# Patient Record
Sex: MALE | Race: WHITE | Employment: OTHER | URBAN - METROPOLITAN AREA
[De-identification: names, ages, dates, MRNs, and addresses within clinical notes are randomized per-mention and may not be internally consistent; named-entity substitution may affect disease eponyms.]

---

## 2017-08-17 ENCOUNTER — OFFICE VISIT (OUTPATIENT)
Dept: PRIMARY CARE CLINIC | Age: 62
End: 2017-08-17
Payer: COMMERCIAL

## 2017-08-17 VITALS
HEIGHT: 70 IN | WEIGHT: 152 LBS | DIASTOLIC BLOOD PRESSURE: 86 MMHG | HEART RATE: 76 BPM | BODY MASS INDEX: 21.76 KG/M2 | RESPIRATION RATE: 18 BRPM | SYSTOLIC BLOOD PRESSURE: 158 MMHG

## 2017-08-17 DIAGNOSIS — L24.7 IRRITANT CONTACT DERMATITIS DUE TO PLANTS, EXCEPT FOOD: Primary | ICD-10-CM

## 2017-08-17 PROCEDURE — 99212 OFFICE O/P EST SF 10 MIN: CPT | Performed by: INTERNAL MEDICINE

## 2017-08-17 RX ORDER — HYDROXYZINE HYDROCHLORIDE 10 MG/1
10 TABLET, FILM COATED ORAL 2 TIMES DAILY PRN
Qty: 60 TABLET | Refills: 1 | Status: SHIPPED | OUTPATIENT
Start: 2017-08-17 | End: 2018-06-29 | Stop reason: ALTCHOICE

## 2017-08-17 RX ORDER — CLOBETASOL PROPIONATE 0.5 MG/G
CREAM TOPICAL DAILY
Qty: 30 G | Refills: 1 | Status: SHIPPED | OUTPATIENT
Start: 2017-08-17 | End: 2019-01-17

## 2017-08-17 ASSESSMENT — ENCOUNTER SYMPTOMS
VOMITING: 0
EYE ITCHING: 0
COLOR CHANGE: 1
EYE DISCHARGE: 0
SORE THROAT: 0
SINUS PRESSURE: 0
DIARRHEA: 0
EYE REDNESS: 0
NAUSEA: 0
SHORTNESS OF BREATH: 0
ABDOMINAL PAIN: 0
EYE PAIN: 0

## 2017-08-17 ASSESSMENT — PATIENT HEALTH QUESTIONNAIRE - PHQ9
SUM OF ALL RESPONSES TO PHQ QUESTIONS 1-9: 0
SUM OF ALL RESPONSES TO PHQ9 QUESTIONS 1 & 2: 0
2. FEELING DOWN, DEPRESSED OR HOPELESS: 0
1. LITTLE INTEREST OR PLEASURE IN DOING THINGS: 0

## 2017-11-17 ENCOUNTER — OFFICE VISIT (OUTPATIENT)
Dept: PRIMARY CARE CLINIC | Age: 62
End: 2017-11-17
Payer: COMMERCIAL

## 2017-11-17 VITALS
DIASTOLIC BLOOD PRESSURE: 88 MMHG | SYSTOLIC BLOOD PRESSURE: 138 MMHG | RESPIRATION RATE: 18 BRPM | WEIGHT: 154.4 LBS | BODY MASS INDEX: 22.1 KG/M2 | HEART RATE: 56 BPM | HEIGHT: 70 IN

## 2017-11-17 DIAGNOSIS — Z13.0 SCREENING, ANEMIA, DEFICIENCY, IRON: ICD-10-CM

## 2017-11-17 DIAGNOSIS — I10 ESSENTIAL HYPERTENSION: ICD-10-CM

## 2017-11-17 DIAGNOSIS — Z23 NEED FOR INFLUENZA VACCINATION: ICD-10-CM

## 2017-11-17 DIAGNOSIS — K21.9 GASTROESOPHAGEAL REFLUX DISEASE, ESOPHAGITIS PRESENCE NOT SPECIFIED: ICD-10-CM

## 2017-11-17 DIAGNOSIS — I25.10 CORONARY ARTERY DISEASE INVOLVING NATIVE HEART WITHOUT ANGINA PECTORIS, UNSPECIFIED VESSEL OR LESION TYPE: Chronic | ICD-10-CM

## 2017-11-17 DIAGNOSIS — J45.40 MODERATE PERSISTENT ASTHMA WITHOUT COMPLICATION: ICD-10-CM

## 2017-11-17 DIAGNOSIS — Z23 NEED FOR VACCINATION FOR STREP PNEUMONIAE: ICD-10-CM

## 2017-11-17 DIAGNOSIS — Z00.00 ROUTINE GENERAL MEDICAL EXAMINATION AT A HEALTH CARE FACILITY: Primary | ICD-10-CM

## 2017-11-17 PROCEDURE — 90732 PPSV23 VACC 2 YRS+ SUBQ/IM: CPT | Performed by: NURSE PRACTITIONER

## 2017-11-17 PROCEDURE — 90471 IMMUNIZATION ADMIN: CPT | Performed by: NURSE PRACTITIONER

## 2017-11-17 PROCEDURE — 99396 PREV VISIT EST AGE 40-64: CPT | Performed by: NURSE PRACTITIONER

## 2017-11-17 PROCEDURE — 90658 IIV3 VACCINE SPLT 0.5 ML IM: CPT | Performed by: NURSE PRACTITIONER

## 2017-11-17 PROCEDURE — 90472 IMMUNIZATION ADMIN EACH ADD: CPT | Performed by: NURSE PRACTITIONER

## 2017-11-17 RX ORDER — FLUTICASONE PROPIONATE 50 MCG
SPRAY, SUSPENSION (ML) NASAL
Qty: 48 G | Refills: 3 | Status: SHIPPED | OUTPATIENT
Start: 2017-11-17 | End: 2018-04-03 | Stop reason: SDUPTHER

## 2017-11-17 RX ORDER — MONTELUKAST SODIUM 10 MG/1
10 TABLET ORAL NIGHTLY
Qty: 90 TABLET | Refills: 3 | Status: SHIPPED | OUTPATIENT
Start: 2017-11-17 | End: 2019-01-17

## 2017-11-17 RX ORDER — ALBUTEROL SULFATE 90 UG/1
AEROSOL, METERED RESPIRATORY (INHALATION)
Qty: 54 G | Refills: 3 | Status: SHIPPED | OUTPATIENT
Start: 2017-11-17 | End: 2019-01-17 | Stop reason: SDUPTHER

## 2017-11-17 RX ORDER — PREDNISONE 20 MG/1
20 TABLET ORAL DAILY PRN
COMMUNITY
End: 2017-11-17 | Stop reason: SDUPTHER

## 2017-11-17 RX ORDER — PANTOPRAZOLE SODIUM 40 MG/1
40 TABLET, DELAYED RELEASE ORAL DAILY
Qty: 90 TABLET | Refills: 3 | Status: SHIPPED | OUTPATIENT
Start: 2017-11-17 | End: 2019-01-17 | Stop reason: SDUPTHER

## 2017-11-17 RX ORDER — PREDNISONE 20 MG/1
TABLET ORAL
Qty: 30 TABLET | Refills: 0 | Status: SHIPPED | OUTPATIENT
Start: 2017-11-17 | End: 2018-05-07 | Stop reason: ALTCHOICE

## 2017-11-17 RX ORDER — ALBUTEROL SULFATE 2.5 MG/3ML
2.5 SOLUTION RESPIRATORY (INHALATION) EVERY 6 HOURS PRN
Qty: 360 EACH | Refills: 3 | Status: SHIPPED | OUTPATIENT
Start: 2017-11-17 | End: 2019-01-17 | Stop reason: ALTCHOICE

## 2017-11-17 ASSESSMENT — ENCOUNTER SYMPTOMS
ABDOMINAL PAIN: 0
BLOOD IN STOOL: 0
SHORTNESS OF BREATH: 0
VOMITING: 0
NAUSEA: 0
TROUBLE SWALLOWING: 0
CONSTIPATION: 0
DIARRHEA: 0
SINUS PRESSURE: 0
SORE THROAT: 0
WHEEZING: 1
COUGH: 0
CHEST TIGHTNESS: 1

## 2017-11-17 NOTE — PROGRESS NOTES
[facility]  [facility] INTERNAL MEDICINE  [address]  [address] [facility] [address]  Dept: [phone]  Dept Fax: [phone]    José Miguel Landeros is a 60 y.o. male who presents today for his medical conditions/complaints as noted below. José Miguel Landeros is c/o of   Chief Complaint   Patient presents with    Asthma     yearly exam, med refills           HPI:     Here today for annual visit  Has been feeling well, continues to follow healthy diet and exercise regularly  Only took HCTZ a couple weeks, states that it made him feel \"groggy\"  Has not been checking BP at home on regular basis  States that he really prefers to not take meds unless he \"really\" needs them  He no longer follows up with cardio      Asthma   He complains of chest tightness and wheezing. There is no cough or shortness of breath. This is a chronic problem. The current episode started more than 1 year ago. The problem occurs rarely. The problem has been unchanged. Pertinent negatives include no appetite change, chest pain, ear pain, fever, headaches, myalgias, sore throat or trouble swallowing. His symptoms are aggravated by change in weather. His symptoms are alleviated by beta-agonist. He reports significant improvement on treatment. His past medical history is significant for asthma. Coronary Artery Disease   Presents for follow-up visit. Pertinent negatives include no chest pain, dizziness, leg swelling, palpitations or shortness of breath. The symptoms have been resolved. Compliance with diet is good. Compliance with exercise is good. Compliance with medications is poor.        No results found for: LABA1C          ( goal A1C is < 7)   No results found for: LABMICR  LDL Cholesterol (mg/dL)   Date Value   12/07/2016 78   02/24/2014 90   02/14/2012 80       (goal LDL is <100)   AST (U/L)   Date Value   12/07/2016 24     ALT (U/L)   Date Value   12/07/2016 19     BUN (mg/dL)   Date Value   12/07/2016 24 (H)     BP Readings from  Comprehensive Metabolic Panel     Standing Status:   Future     Standing Expiration Date:   11/17/2018    Lipid Panel     Standing Status:   Future     Standing Expiration Date:   11/17/2018     Order Specific Question:   Is Patient Fasting?/# of Hours     Answer:   8     Orders Placed This Encounter   Medications    fluticasone (FLONASE) 50 MCG/ACT nasal spray     Sig: USE 2 SPRAYS ONCE DAILY     Dispense:  48 g     Refill:  3    albuterol (PROVENTIL) (2.5 MG/3ML) 0.083% nebulizer solution     Sig: Take 3 mLs by nebulization every 6 hours as needed for Wheezing     Dispense:  360 each     Refill:  3    albuterol sulfate HFA (VENTOLIN HFA) 108 (90 Base) MCG/ACT inhaler     Sig: INHALE 2 TO 3 PUFFS EVERY 3 TO 4 HOURS AS NEEDED; MAXIMUM OF 12 PUFFS/24 HOURS. Dispense:  54 g     Refill:  3    predniSONE (DELTASONE) 20 MG tablet     Sig: Take one tablet daily prn chest congestion     Dispense:  30 tablet     Refill:  0    pantoprazole (PROTONIX) 40 MG tablet     Sig: Take 1 tablet by mouth daily     Dispense:  90 tablet     Refill:  3    montelukast (SINGULAIR) 10 MG tablet     Sig: Take 1 tablet by mouth nightly     Dispense:  90 tablet     Refill:  3      Annual visit-continue regular exercise and healthy diet  Asthma, GERD- both stable and well controlled on current meds  HTN, CAD-lengthy discussion on need for treatment. He remains opposed to meds, fears side effects. Did say that he will consider and discuss with his wife. Explained affect of uncontrolled HTN on CV system. Urged to return in 4 months, check BP and record at home and bring log to follow up visit. Patient given educational materials - see patient instructions. Discussed use, benefit, and side effects of prescribed medications. All patient questions answered. Pt voiced understanding. Reviewed health maintenance. Instructed to continue current medications, diet and exercise. Patient agreed with treatment plan.  Follow up as

## 2018-04-03 DIAGNOSIS — J45.40 MODERATE PERSISTENT ASTHMA WITHOUT COMPLICATION: ICD-10-CM

## 2018-04-03 RX ORDER — DEXAMETHASONE 4 MG/1
TABLET ORAL
Qty: 36 G | Refills: 3 | Status: SHIPPED | OUTPATIENT
Start: 2018-04-03 | End: 2019-01-17 | Stop reason: SDUPTHER

## 2018-04-03 RX ORDER — FLUTICASONE PROPIONATE 50 MCG
SPRAY, SUSPENSION (ML) NASAL
Qty: 48 G | Refills: 0 | Status: SHIPPED | OUTPATIENT
Start: 2018-04-03 | End: 2018-07-31 | Stop reason: SDUPTHER

## 2018-05-04 ENCOUNTER — NURSE TRIAGE (OUTPATIENT)
Dept: OTHER | Age: 63
End: 2018-05-04

## 2018-05-07 ENCOUNTER — TELEPHONE (OUTPATIENT)
Dept: PRIMARY CARE CLINIC | Age: 63
End: 2018-05-07

## 2018-05-07 DIAGNOSIS — M54.40 ACUTE LOW BACK PAIN WITH SCIATICA, SCIATICA LATERALITY UNSPECIFIED, UNSPECIFIED BACK PAIN LATERALITY: Primary | ICD-10-CM

## 2018-05-07 RX ORDER — IBUPROFEN 800 MG/1
800 TABLET ORAL EVERY 6 HOURS PRN
Qty: 90 TABLET | Refills: 0 | Status: SHIPPED | OUTPATIENT
Start: 2018-05-07 | End: 2022-01-24

## 2018-05-07 RX ORDER — CYCLOBENZAPRINE HCL 10 MG
10 TABLET ORAL 3 TIMES DAILY PRN
Qty: 30 TABLET | Refills: 0 | Status: SHIPPED | OUTPATIENT
Start: 2018-05-07 | End: 2020-07-09 | Stop reason: SDUPTHER

## 2018-05-07 RX ORDER — PREDNISONE 50 MG/1
50 TABLET ORAL DAILY
Qty: 5 TABLET | Refills: 0 | Status: SHIPPED | OUTPATIENT
Start: 2018-05-07 | End: 2018-06-29 | Stop reason: ALTCHOICE

## 2018-06-29 ENCOUNTER — OFFICE VISIT (OUTPATIENT)
Dept: PRIMARY CARE CLINIC | Age: 63
End: 2018-06-29
Payer: COMMERCIAL

## 2018-06-29 VITALS
WEIGHT: 158.4 LBS | SYSTOLIC BLOOD PRESSURE: 164 MMHG | DIASTOLIC BLOOD PRESSURE: 92 MMHG | BODY MASS INDEX: 22.68 KG/M2 | HEIGHT: 70 IN | RESPIRATION RATE: 18 BRPM | HEART RATE: 72 BPM

## 2018-06-29 DIAGNOSIS — M54.41 ACUTE RIGHT-SIDED LOW BACK PAIN WITH RIGHT-SIDED SCIATICA: Primary | ICD-10-CM

## 2018-06-29 PROCEDURE — 99214 OFFICE O/P EST MOD 30 MIN: CPT | Performed by: NURSE PRACTITIONER

## 2018-06-29 RX ORDER — PREDNISONE 50 MG/1
50 TABLET ORAL DAILY
Qty: 5 TABLET | Refills: 0 | Status: SHIPPED | OUTPATIENT
Start: 2018-06-29 | End: 2019-01-17 | Stop reason: ALTCHOICE

## 2018-06-29 ASSESSMENT — ENCOUNTER SYMPTOMS
VOMITING: 0
TROUBLE SWALLOWING: 0
WHEEZING: 0
NAUSEA: 0
DIARRHEA: 0
SINUS PRESSURE: 0
BACK PAIN: 1
CONSTIPATION: 0
SORE THROAT: 0
BLOOD IN STOOL: 0
SHORTNESS OF BREATH: 0
ABDOMINAL PAIN: 0
COUGH: 0

## 2019-01-17 ENCOUNTER — OFFICE VISIT (OUTPATIENT)
Dept: PRIMARY CARE CLINIC | Age: 64
End: 2019-01-17
Payer: COMMERCIAL

## 2019-01-17 VITALS
WEIGHT: 177.8 LBS | HEIGHT: 70 IN | SYSTOLIC BLOOD PRESSURE: 162 MMHG | DIASTOLIC BLOOD PRESSURE: 98 MMHG | HEART RATE: 60 BPM | BODY MASS INDEX: 25.45 KG/M2 | RESPIRATION RATE: 18 BRPM

## 2019-01-17 DIAGNOSIS — K21.9 GASTROESOPHAGEAL REFLUX DISEASE, ESOPHAGITIS PRESENCE NOT SPECIFIED: ICD-10-CM

## 2019-01-17 DIAGNOSIS — I10 ESSENTIAL HYPERTENSION: Primary | ICD-10-CM

## 2019-01-17 DIAGNOSIS — J45.40 MODERATE PERSISTENT ASTHMA WITHOUT COMPLICATION: ICD-10-CM

## 2019-01-17 DIAGNOSIS — Z23 NEED FOR INFLUENZA VACCINATION: ICD-10-CM

## 2019-01-17 DIAGNOSIS — Z12.11 SCREENING FOR COLON CANCER: ICD-10-CM

## 2019-01-17 PROCEDURE — 90686 IIV4 VACC NO PRSV 0.5 ML IM: CPT | Performed by: NURSE PRACTITIONER

## 2019-01-17 PROCEDURE — 90471 IMMUNIZATION ADMIN: CPT | Performed by: NURSE PRACTITIONER

## 2019-01-17 PROCEDURE — 99214 OFFICE O/P EST MOD 30 MIN: CPT | Performed by: NURSE PRACTITIONER

## 2019-01-17 RX ORDER — ASCORBIC ACID 500 MG
500 TABLET ORAL DAILY
COMMUNITY
End: 2020-01-30

## 2019-01-17 RX ORDER — FLUTICASONE PROPIONATE 110 UG/1
AEROSOL, METERED RESPIRATORY (INHALATION)
Qty: 36 G | Refills: 3 | Status: SHIPPED | OUTPATIENT
Start: 2019-01-17 | End: 2020-01-30 | Stop reason: SDUPTHER

## 2019-01-17 RX ORDER — PANTOPRAZOLE SODIUM 40 MG/1
40 TABLET, DELAYED RELEASE ORAL DAILY
Qty: 90 TABLET | Refills: 3 | Status: SHIPPED | OUTPATIENT
Start: 2019-01-17 | End: 2020-01-30 | Stop reason: SDUPTHER

## 2019-01-17 RX ORDER — LISINOPRIL 10 MG/1
10 TABLET ORAL DAILY
Qty: 30 TABLET | Refills: 3 | Status: SHIPPED | OUTPATIENT
Start: 2019-01-17 | End: 2020-01-30

## 2019-01-17 RX ORDER — ALBUTEROL SULFATE 90 UG/1
AEROSOL, METERED RESPIRATORY (INHALATION)
Qty: 54 G | Refills: 3 | Status: SHIPPED | OUTPATIENT
Start: 2019-01-17 | End: 2020-01-30

## 2019-01-17 ASSESSMENT — PATIENT HEALTH QUESTIONNAIRE - PHQ9
SUM OF ALL RESPONSES TO PHQ QUESTIONS 1-9: 0
1. LITTLE INTEREST OR PLEASURE IN DOING THINGS: 0
SUM OF ALL RESPONSES TO PHQ QUESTIONS 1-9: 0
SUM OF ALL RESPONSES TO PHQ9 QUESTIONS 1 & 2: 0
2. FEELING DOWN, DEPRESSED OR HOPELESS: 0

## 2019-01-17 ASSESSMENT — ENCOUNTER SYMPTOMS
BLOOD IN STOOL: 0
CONSTIPATION: 0
SHORTNESS OF BREATH: 0
WHEEZING: 0
NAUSEA: 0
SINUS PRESSURE: 0
ABDOMINAL PAIN: 0
COUGH: 0
SORE THROAT: 0
DIARRHEA: 0
VOMITING: 0
TROUBLE SWALLOWING: 0

## 2019-03-27 ENCOUNTER — TELEPHONE (OUTPATIENT)
Dept: PRIMARY CARE CLINIC | Age: 64
End: 2019-03-27

## 2019-06-14 ENCOUNTER — EMPLOYEE WELLNESS (OUTPATIENT)
Dept: OTHER | Age: 64
End: 2019-06-14

## 2019-06-14 LAB
CHOLESTEROL/HDL RATIO: 2
CHOLESTEROL: 153 MG/DL
GLUCOSE BLD-MCNC: 107 MG/DL (ref 70–99)
HDLC SERPL-MCNC: 77 MG/DL
LDL CHOLESTEROL: 70 MG/DL (ref 0–130)
PATIENT FASTING?: YES
TRIGL SERPL-MCNC: 32 MG/DL
VLDLC SERPL CALC-MCNC: ABNORMAL MG/DL (ref 1–30)

## 2019-06-24 VITALS — WEIGHT: 168 LBS | BODY MASS INDEX: 24.11 KG/M2

## 2019-07-22 ENCOUNTER — TELEPHONE (OUTPATIENT)
Dept: PRIMARY CARE CLINIC | Age: 64
End: 2019-07-22

## 2019-07-22 NOTE — TELEPHONE ENCOUNTER
Letter sent to patient explaining they are due for a colon cancer screening and asked that they call office with their choice of screening.

## 2019-09-26 ENCOUNTER — TELEPHONE (OUTPATIENT)
Dept: SURGERY | Age: 64
End: 2019-09-26

## 2020-01-30 ENCOUNTER — HOSPITAL ENCOUNTER (OUTPATIENT)
Age: 65
Discharge: HOME | End: 2020-01-30
Payer: COMMERCIAL

## 2020-01-30 ENCOUNTER — OFFICE VISIT (OUTPATIENT)
Dept: PRIMARY CARE CLINIC | Age: 65
End: 2020-01-30
Payer: COMMERCIAL

## 2020-01-30 VITALS
BODY MASS INDEX: 25.74 KG/M2 | SYSTOLIC BLOOD PRESSURE: 152 MMHG | RESPIRATION RATE: 15 BRPM | WEIGHT: 173.8 LBS | OXYGEN SATURATION: 99 % | HEART RATE: 56 BPM | DIASTOLIC BLOOD PRESSURE: 100 MMHG | HEIGHT: 69 IN

## 2020-01-30 LAB
ABSOLUTE EOS #: 0.12 K/UL (ref 0–0.44)
ABSOLUTE IMMATURE GRANULOCYTE: <0.03 K/UL (ref 0–0.3)
ABSOLUTE LYMPH #: 1.85 K/UL (ref 1.1–3.7)
ABSOLUTE MONO #: 0.6 K/UL (ref 0.1–1.2)
ALBUMIN SERPL-MCNC: 4.4 G/DL (ref 3.5–5.2)
ALBUMIN/GLOBULIN RATIO: 1.8 (ref 1–2.5)
ALP BLD-CCNC: 82 U/L (ref 40–129)
ALT SERPL-CCNC: 26 U/L (ref 5–41)
ANION GAP SERPL CALCULATED.3IONS-SCNC: 15 MMOL/L (ref 9–17)
AST SERPL-CCNC: 36 U/L
BASOPHILS # BLD: 1 % (ref 0–2)
BASOPHILS ABSOLUTE: 0.03 K/UL (ref 0–0.2)
BILIRUB SERPL-MCNC: 0.66 MG/DL (ref 0.3–1.2)
BUN BLDV-MCNC: 16 MG/DL (ref 8–23)
BUN/CREAT BLD: NORMAL (ref 9–20)
CALCIUM SERPL-MCNC: 9.4 MG/DL (ref 8.6–10.4)
CHLORIDE BLD-SCNC: 103 MMOL/L (ref 98–107)
CHOLESTEROL/HDL RATIO: 2.2
CHOLESTEROL: 172 MG/DL
CO2: 21 MMOL/L (ref 20–31)
CREAT SERPL-MCNC: 0.8 MG/DL (ref 0.7–1.2)
DIFFERENTIAL TYPE: NORMAL
EOSINOPHILS RELATIVE PERCENT: 2 % (ref 1–4)
GFR AFRICAN AMERICAN: >60 ML/MIN
GFR NON-AFRICAN AMERICAN: >60 ML/MIN
GFR SERPL CREATININE-BSD FRML MDRD: NORMAL ML/MIN/{1.73_M2}
GFR SERPL CREATININE-BSD FRML MDRD: NORMAL ML/MIN/{1.73_M2}
GLUCOSE BLD-MCNC: 76 MG/DL (ref 70–99)
HCT VFR BLD CALC: 45.1 % (ref 40.7–50.3)
HDLC SERPL-MCNC: 78 MG/DL
HEMOGLOBIN: 14.4 G/DL (ref 13–17)
IMMATURE GRANULOCYTES: 0 %
LDL CHOLESTEROL: 86 MG/DL (ref 0–130)
LYMPHOCYTES # BLD: 32 % (ref 24–43)
MCH RBC QN AUTO: 31.4 PG (ref 25.2–33.5)
MCHC RBC AUTO-ENTMCNC: 31.9 G/DL (ref 28.4–34.8)
MCV RBC AUTO: 98.3 FL (ref 82.6–102.9)
MONOCYTES # BLD: 10 % (ref 3–12)
NRBC AUTOMATED: 0 PER 100 WBC
PDW BLD-RTO: 11.8 % (ref 11.8–14.4)
PLATELET # BLD: 231 K/UL (ref 138–453)
PLATELET ESTIMATE: NORMAL
PMV BLD AUTO: 11.3 FL (ref 8.1–13.5)
POTASSIUM SERPL-SCNC: 4.3 MMOL/L (ref 3.7–5.3)
PROSTATE SPECIFIC ANTIGEN: 3.79 UG/L
RBC # BLD: 4.59 M/UL (ref 4.21–5.77)
RBC # BLD: NORMAL 10*6/UL
SEG NEUTROPHILS: 55 % (ref 36–65)
SEGMENTED NEUTROPHILS ABSOLUTE COUNT: 3.17 K/UL (ref 1.5–8.1)
SODIUM BLD-SCNC: 139 MMOL/L (ref 135–144)
TOTAL PROTEIN: 6.9 G/DL (ref 6.4–8.3)
TRIGL SERPL-MCNC: 40 MG/DL
VLDLC SERPL CALC-MCNC: NORMAL MG/DL (ref 1–30)
WBC # BLD: 5.8 K/UL (ref 3.5–11.3)
WBC # BLD: NORMAL 10*3/UL

## 2020-01-30 PROCEDURE — 90471 IMMUNIZATION ADMIN: CPT | Performed by: NURSE PRACTITIONER

## 2020-01-30 PROCEDURE — G0103 PSA SCREENING: HCPCS

## 2020-01-30 PROCEDURE — 85025 COMPLETE CBC W/AUTO DIFF WBC: CPT

## 2020-01-30 PROCEDURE — 99214 OFFICE O/P EST MOD 30 MIN: CPT | Performed by: NURSE PRACTITIONER

## 2020-01-30 PROCEDURE — 90653 IIV ADJUVANT VACCINE IM: CPT | Performed by: NURSE PRACTITIONER

## 2020-01-30 PROCEDURE — 80053 COMPREHEN METABOLIC PANEL: CPT

## 2020-01-30 PROCEDURE — 80061 LIPID PANEL: CPT

## 2020-01-30 PROCEDURE — 36415 COLL VENOUS BLD VENIPUNCTURE: CPT

## 2020-01-30 RX ORDER — FLUTICASONE PROPIONATE 50 MCG
SPRAY, SUSPENSION (ML) NASAL
Qty: 48 G | Refills: 5 | Status: SHIPPED | OUTPATIENT
Start: 2020-01-30

## 2020-01-30 RX ORDER — AMLODIPINE BESYLATE 5 MG/1
5 TABLET ORAL DAILY
Qty: 30 TABLET | Refills: 5 | Status: SHIPPED | OUTPATIENT
Start: 2020-01-30 | End: 2020-07-09 | Stop reason: SDUPTHER

## 2020-01-30 RX ORDER — PANTOPRAZOLE SODIUM 40 MG/1
40 TABLET, DELAYED RELEASE ORAL DAILY
Qty: 90 TABLET | Refills: 3 | Status: SHIPPED | OUTPATIENT
Start: 2020-01-30 | End: 2021-01-05 | Stop reason: SDUPTHER

## 2020-01-30 RX ORDER — FLUTICASONE PROPIONATE 110 UG/1
AEROSOL, METERED RESPIRATORY (INHALATION)
Qty: 36 G | Refills: 3 | Status: SHIPPED | OUTPATIENT
Start: 2020-01-30 | End: 2021-01-05 | Stop reason: SDUPTHER

## 2020-01-30 ASSESSMENT — ENCOUNTER SYMPTOMS
BLOOD IN STOOL: 0
WHEEZING: 0
TROUBLE SWALLOWING: 0
NAUSEA: 0
DIARRHEA: 0
SHORTNESS OF BREATH: 0
ABDOMINAL PAIN: 0
COUGH: 0
VOMITING: 0
SORE THROAT: 0
SINUS PRESSURE: 0
CONSTIPATION: 0

## 2020-01-30 NOTE — PROGRESS NOTES
INFLUENZA VIRUS VACCINE    Do you have a cold or any other illness today?  no  Do you have a serious allergy to eggs? no  Do you have any other serious allergies? no  Are you allergic to Thimerosal?  no  Are you allergic to latex products?  no  Have you ever had Guillain-Matteson Syndrome?  no  Have you had a flu shot in the past?  yes  Did you ever have a reaction to the flu shot? no  Are you or have you been on an anti-viral medication within the last 48 hours?  no    Patient/guardian was given a copy of the 2019/2020 CDC Vaccine Information Sheet (VIS) on the Inactivated Influenza (flu) Vaccine at this visit for review. Patient consents to vaccine administration at this vis.

## 2020-06-02 RX ORDER — PREDNISONE 50 MG/1
50 TABLET ORAL DAILY
Qty: 30 TABLET | Refills: 0 | Status: SHIPPED | OUTPATIENT
Start: 2020-06-02 | End: 2021-01-05 | Stop reason: ALTCHOICE

## 2020-07-09 ENCOUNTER — OFFICE VISIT (OUTPATIENT)
Dept: PRIMARY CARE CLINIC | Age: 65
End: 2020-07-09
Payer: COMMERCIAL

## 2020-07-09 VITALS
TEMPERATURE: 97.5 F | BODY MASS INDEX: 24.6 KG/M2 | SYSTOLIC BLOOD PRESSURE: 142 MMHG | DIASTOLIC BLOOD PRESSURE: 90 MMHG | HEART RATE: 72 BPM | OXYGEN SATURATION: 98 % | WEIGHT: 166.6 LBS

## 2020-07-09 PROCEDURE — 99214 OFFICE O/P EST MOD 30 MIN: CPT | Performed by: NURSE PRACTITIONER

## 2020-07-09 RX ORDER — CYCLOBENZAPRINE HCL 10 MG
10 TABLET ORAL 3 TIMES DAILY PRN
Qty: 30 TABLET | Refills: 1 | Status: SHIPPED | OUTPATIENT
Start: 2020-07-09 | End: 2020-07-19

## 2020-07-09 RX ORDER — AMLODIPINE BESYLATE 10 MG/1
10 TABLET ORAL DAILY
Qty: 30 TABLET | Refills: 5 | Status: SHIPPED | OUTPATIENT
Start: 2020-07-09 | End: 2021-01-05 | Stop reason: SINTOL

## 2020-07-09 ASSESSMENT — ENCOUNTER SYMPTOMS
VOMITING: 0
SINUS PRESSURE: 0
NAUSEA: 0
TROUBLE SWALLOWING: 0
BLOOD IN STOOL: 0
BACK PAIN: 1
ABDOMINAL PAIN: 0
WHEEZING: 0
DIARRHEA: 0
CONSTIPATION: 0
SHORTNESS OF BREATH: 0
COUGH: 0
SORE THROAT: 0

## 2020-07-09 NOTE — PROGRESS NOTES
[facility] PRIMARY CARE  [facility] [address]  [address] [address]  Dept: [phone]  Dept Fax: [phone]    Darryn Zamora is a 71 y.o. male who presentstoday for his medical conditions/complaints as noted below. Darryn Zamora is c/o of  Chief Complaint   Patient presents with    Hypertension    Back Pain     asking for flexeril to be re ordered if possible         HPI:     Here today for concern about BP and single episode of chest pain 2 months ago  Occurred while exerting himself moving a fallen tree from his fence  The episode lasted about 16 hours  Has not had any further episodes but states did feel similar to when he had a heart attack in 06  Since this time he has decided to take his blood pressure medications as ordered 6 months ago  He has continued to bleed and otherwise healthy lifestyle with regular exercise and healthy food choices    Has been having increased back pain over the past few weeks, had old rx flexeril that has helped some with taking 1 daily, would like refill  Denies any known injury    Hypertension   This is a chronic problem. The current episode started more than 1 year ago. The problem is controlled. Pertinent negatives include no chest pain, headaches, palpitations, peripheral edema or shortness of breath. Past treatments include calcium channel blockers. The current treatment provides moderate improvement. There are no compliance problems. Hypertensive end-organ damage includes CAD/MI. Back Pain   This is a recurrent problem. The current episode started 1 to 4 weeks ago. The problem occurs daily. The problem has been gradually improving since onset. The pain is present in the lumbar spine. The quality of the pain is described as aching. The symptoms are aggravated by position. Pertinent negatives include no abdominal pain, chest pain, fever, headaches or weakness. He has tried muscle relaxant, heat and NSAIDs for the symptoms.  The treatment provided significant relief. No results found for: LABA1C          ( goal A1C is < 7)   No results found for: LABMICR  LDL Cholesterol (mg/dL)   Date Value   01/30/2020 86   06/14/2019 70   12/07/2016 78       (goal LDL is <100)   AST (U/L)   Date Value   01/30/2020 36     ALT (U/L)   Date Value   01/30/2020 26     BUN (mg/dL)   Date Value   01/30/2020 16     BP Readings from Last 3 Encounters:   07/09/20 (!) 142/90   01/30/20 (!) 152/100   01/17/19 (!) 162/98          ([id number])    Past Medical History:   Diagnosis Date    Allergic rhinitis     Asthma     CAD (coronary artery disease)     MI 7/26/2006; 2 stents    Hypertension     Irritant contact dermatitis due to plants, except food 8/17/2017      Past Surgical History:   Procedure Laterality Date    COLONOSCOPY  2004       Family History   Problem Relation Age of Onset    Stroke Father 67          Social History     Tobacco Use    Smoking status: Never Smoker    Smokeless tobacco: Never Used   Substance Use Topics    Alcohol use: No      Current Outpatient Medications   Medication Sig Dispense Refill    amLODIPine (NORVASC) 10 MG tablet Take 1 tablet by mouth daily 30 tablet 5    cyclobenzaprine (FLEXERIL) 10 MG tablet Take 1 tablet by mouth 3 times daily as needed for Muscle spasms 30 tablet 1    predniSONE (DELTASONE) 50 MG tablet Take 1 tablet by mouth daily 30 tablet 0    LORATADINE PO Take by mouth      fluticasone (FLONASE) 50 MCG/ACT nasal spray USE 2 SPRAYS NASALLY AS DIRECTED ONCE DAILY. 48 g 5    pantoprazole (PROTONIX) 40 MG tablet Take 1 tablet by mouth daily 90 tablet 3    fluticasone (FLOVENT HFA) 110 MCG/ACT inhaler INHALE 2 PUFFS INTO THE LUNGS TWICE DAILY. 36 g 3    ibuprofen (ADVIL;MOTRIN) 800 MG tablet Take 1 tablet by mouth every 6 hours as needed for Pain 90 tablet 0     No current facility-administered medications for this visit.       Allergies   Allergen Reactions    Cephalexin        Health Maintenance Topic Date Due    Hepatitis C screen  1955    HIV screen  08/26/1969    Shingles Vaccine (1 of 2) 08/26/2004    Colon cancer screen colonoscopy  08/26/2004    Flu vaccine (1) 09/01/2020    Pneumococcal 65+ years Vaccine (2 of 2 - PPSV23) 11/17/2022    Lipid screen  01/30/2025    DTaP/Tdap/Td vaccine (2 - Td) 11/17/2026    Hepatitis A vaccine  Aged Out    Hepatitis B vaccine  Aged Out    Hib vaccine  Aged Out    Meningococcal (ACWY) vaccine  Aged Out       Subjective:      Review of Systems   Constitutional: Negative for activity change, appetite change, chills, fatigue, fever and unexpected weight change. HENT: Negative for congestion, ear pain, hearing loss, sinus pressure, sore throat and trouble swallowing. Eyes: Negative for visual disturbance. Respiratory: Negative for cough, shortness of breath and wheezing. Cardiovascular: Negative for chest pain, palpitations and leg swelling. Gastrointestinal: Negative for abdominal pain, blood in stool, constipation, diarrhea, nausea and vomiting. Endocrine: Negative for cold intolerance, heat intolerance, polydipsia, polyphagia and polyuria. Genitourinary: Negative for difficulty urinating, frequency, hematuria and urgency. Musculoskeletal: Positive for back pain. Negative for arthralgias and myalgias. Skin: Negative for rash. Allergic/Immunologic: Negative for environmental allergies. Neurological: Negative for dizziness, weakness, light-headedness and headaches. Psychiatric/Behavioral: Negative for confusion. The patient is not nervous/anxious. Objective:     Physical Exam  Constitutional:       Appearance: He is well-developed. HENT:      Head: Normocephalic. Eyes:      Conjunctiva/sclera: Conjunctivae normal.      Pupils: Pupils are equal, round, and reactive to light. Neck:      Musculoskeletal: Normal range of motion. Cardiovascular:      Rate and Rhythm: Normal rate and regular rhythm.       Heart sounds: Normal heart sounds. No murmur. Pulmonary:      Effort: Pulmonary effort is normal.      Breath sounds: Normal breath sounds. No wheezing. Abdominal:      General: Bowel sounds are normal. There is no distension. Palpations: Abdomen is soft. Musculoskeletal: Normal range of motion. Skin:     General: Skin is warm and dry. Neurological:      Mental Status: He is alert and oriented to person, place, and time. Psychiatric:         Behavior: Behavior normal.         Thought Content: Thought content normal.         Judgment: Judgment normal.       BP (!) 142/90   Pulse 72   Temp 97.5 °F (36.4 °C)   Wt 166 lb 9.6 oz (75.6 kg)   SpO2 98%   BMI 24.60 kg/m²     Assessment:       Diagnosis Orders   1. Essential hypertension  amLODIPine (NORVASC) 10 MG tablet   2. Colon cancer screening     3. Acute low back pain with sciatica, sciatica laterality unspecified, unspecified back pain laterality  cyclobenzaprine (FLEXERIL) 10 MG tablet   4. Coronary artery disease involving native heart without angina pectoris, unspecified vessel or lesion type               Plan:      Return in about 3 months (around 10/9/2020) for hypertension check. Hypertension, CAD- Home blood pressures ranging 130s to 140s over 80s to 90s. Will increase amlodipine to maximum dosage. Lengthy discussion on blood pressure and CAD disease processes, discussed risk for another cardiac event. Moderate concern single episode of chest pain was cardiac related. Offered referral to cardiology but he declines at this time, states he will discuss with his wife. Emphasized if has another episode of chest pain to call 911, described he is at risk for a second MI  Back pain- refill on Flexeril, advised stretches, suggested topical agents over-the-counter. If persist/worsens return to office for further evaluation.   May benefit from physical therapy     Orders Placed This Encounter   Medications    amLODIPine (NORVASC) 10 MG tablet     Sig: Take 1 tablet by mouth daily     Dispense:  30 tablet     Refill:  5    cyclobenzaprine (FLEXERIL) 10 MG tablet     Sig: Take 1 tablet by mouth 3 times daily as needed for Muscle spasms     Dispense:  30 tablet     Refill:  1       Patient given educational materials - see patient instructions. Discussed use, benefit, and side effects of prescribed medications. All patientquestions answered. Pt voiced understanding. Reviewed health maintenance. Instructedto continue current medications, diet and exercise. Patient agreed with treatmentplan. Follow up as directed.      Electronicallysigned by ISI Mendoza CNP on 7/9/2020 at 12:53 PM

## 2021-01-05 ENCOUNTER — OFFICE VISIT (OUTPATIENT)
Dept: PRIMARY CARE CLINIC | Age: 66
End: 2021-01-05
Payer: COMMERCIAL

## 2021-01-05 VITALS
TEMPERATURE: 97 F | DIASTOLIC BLOOD PRESSURE: 82 MMHG | HEART RATE: 88 BPM | SYSTOLIC BLOOD PRESSURE: 136 MMHG | HEIGHT: 69 IN | OXYGEN SATURATION: 98 % | WEIGHT: 170.4 LBS | BODY MASS INDEX: 25.24 KG/M2

## 2021-01-05 DIAGNOSIS — Z23 NEED FOR IMMUNIZATION AGAINST INFLUENZA: ICD-10-CM

## 2021-01-05 DIAGNOSIS — Z12.5 SCREENING FOR MALIGNANT NEOPLASM OF PROSTATE: ICD-10-CM

## 2021-01-05 DIAGNOSIS — J45.40 MODERATE PERSISTENT ASTHMA WITHOUT COMPLICATION: ICD-10-CM

## 2021-01-05 DIAGNOSIS — K21.9 GASTROESOPHAGEAL REFLUX DISEASE, UNSPECIFIED WHETHER ESOPHAGITIS PRESENT: ICD-10-CM

## 2021-01-05 DIAGNOSIS — I10 ESSENTIAL HYPERTENSION: Primary | ICD-10-CM

## 2021-01-05 DIAGNOSIS — Z13.0 SCREENING, ANEMIA, DEFICIENCY, IRON: ICD-10-CM

## 2021-01-05 PROCEDURE — 90471 IMMUNIZATION ADMIN: CPT | Performed by: NURSE PRACTITIONER

## 2021-01-05 PROCEDURE — 90694 VACC AIIV4 NO PRSRV 0.5ML IM: CPT | Performed by: NURSE PRACTITIONER

## 2021-01-05 PROCEDURE — 99214 OFFICE O/P EST MOD 30 MIN: CPT | Performed by: NURSE PRACTITIONER

## 2021-01-05 RX ORDER — PANTOPRAZOLE SODIUM 40 MG/1
40 TABLET, DELAYED RELEASE ORAL DAILY
Qty: 90 TABLET | Refills: 3 | Status: SHIPPED | OUTPATIENT
Start: 2021-01-05 | End: 2022-01-24 | Stop reason: SDUPTHER

## 2021-01-05 RX ORDER — FLUTICASONE PROPIONATE 110 UG/1
AEROSOL, METERED RESPIRATORY (INHALATION)
Qty: 36 G | Refills: 3 | Status: SHIPPED | OUTPATIENT
Start: 2021-01-05 | End: 2022-01-24 | Stop reason: SDUPTHER

## 2021-01-05 ASSESSMENT — ENCOUNTER SYMPTOMS
ABDOMINAL PAIN: 0
SHORTNESS OF BREATH: 0
COUGH: 0
NAUSEA: 0
DIARRHEA: 0
VOMITING: 0
WHEEZING: 0
BLOOD IN STOOL: 0
SINUS PRESSURE: 0
CONSTIPATION: 0
TROUBLE SWALLOWING: 0
SORE THROAT: 0

## 2021-01-05 NOTE — PROGRESS NOTES
[facility] PR[facility]ARE  [address]   [address]  [address] [address]  Dept: [phone]  Dept Fax: [phone]    Duke Rogers is a 76 y.o. male who presentstoday for his medical conditions/complaints as noted below. Duke Rogers is c/o of  Chief Complaint   Patient presents with    Hypertension     f/u    Immunizations     flu shot         HPI:     Here today for follow up/annual visit  He admits did not continue amlodipine for blood pressure control  Continues to feel strongly about avoiding medications   He has remained active, walks daily and intermittently does other exercise that includes weightlifting and physical work around the home  He denies any chest pain or shortness of breath, no headaches  Feels well in general  Reports rare flareups of asthma, his current inhaler is working well for him  He has continued to take PPI on regular basis, rare heartburn  No new/additional concerns    Hypertension  This is a chronic problem. The current episode started more than 1 year ago. The problem has been waxing and waning since onset. The problem is uncontrolled. Pertinent negatives include no chest pain, headaches, palpitations, peripheral edema or shortness of breath. Past treatments include calcium channel blockers. The current treatment provides no improvement. Compliance problems: refuses meds. There is no history of CAD/MI or CVA.        No results found for: LABA1C          ( goal A1C is < 7)   No results found for: LABMICR  LDL Cholesterol (mg/dL)   Date Value   01/30/2020 86   06/14/2019 70   12/07/2016 78       (goal LDL is <100)   AST (U/L)   Date Value   01/30/2020 36     ALT (U/L)   Date Value   01/30/2020 26     BUN (mg/dL)   Date Value   01/30/2020 16     BP Readings from Last 3 Encounters:   01/05/21 136/82   07/09/20 (!) 142/90   01/30/20 (!) 152/100          ([id number])    Past Medical History:   Diagnosis Date    Allergic rhinitis     Asthma  CAD (coronary artery disease)     MI 7/26/2006; 2 stents    Hypertension     Irritant contact dermatitis due to plants, except food 8/17/2017      Past Surgical History:   Procedure Laterality Date    COLONOSCOPY  2004       Family History   Problem Relation Age of Onset    Stroke Father 67          Social History     Tobacco Use    Smoking status: Never Smoker    Smokeless tobacco: Never Used   Substance Use Topics    Alcohol use: No      Current Outpatient Medications   Medication Sig Dispense Refill    pantoprazole (PROTONIX) 40 MG tablet Take 1 tablet by mouth daily 90 tablet 3    fluticasone (FLOVENT HFA) 110 MCG/ACT inhaler INHALE 2 PUFFS INTO THE LUNGS TWICE DAILY. 36 g 3    LORATADINE PO Take by mouth      fluticasone (FLONASE) 50 MCG/ACT nasal spray USE 2 SPRAYS NASALLY AS DIRECTED ONCE DAILY. 48 g 5    ibuprofen (ADVIL;MOTRIN) 800 MG tablet Take 1 tablet by mouth every 6 hours as needed for Pain 90 tablet 0     No current facility-administered medications for this visit. Allergies   Allergen Reactions    Cephalexin        Health Maintenance   Topic Date Due    Shingles Vaccine (1 of 2) 08/26/2004    Colon cancer screen colonoscopy  08/26/2004    Pneumococcal 65+ years Vaccine (2 of 2 - PPSV23) 11/17/2022    Lipid screen  01/30/2025    DTaP/Tdap/Td vaccine (2 - Td) 11/17/2026    Flu vaccine  Completed    Hepatitis A vaccine  Aged Out    Hepatitis B vaccine  Aged Out    Hib vaccine  Aged Out    Meningococcal (ACWY) vaccine  Aged Out    Hepatitis C screen  Discontinued       Subjective:      Review of Systems   Constitutional: Negative for activity change, appetite change, chills, fatigue, fever and unexpected weight change. HENT: Negative for congestion, ear pain, hearing loss, sinus pressure, sore throat and trouble swallowing. Eyes: Negative for visual disturbance. Respiratory: Negative for cough, shortness of breath and wheezing. Cardiovascular: Negative for chest pain, palpitations and leg swelling. Gastrointestinal: Negative for abdominal pain, blood in stool, constipation, diarrhea, nausea and vomiting. Endocrine: Negative for cold intolerance, heat intolerance, polydipsia, polyphagia and polyuria. Genitourinary: Negative for difficulty urinating, frequency, hematuria and urgency. Musculoskeletal: Negative for arthralgias and myalgias. Skin: Negative for rash. Allergic/Immunologic: Negative for environmental allergies. Neurological: Negative for dizziness, weakness, light-headedness and headaches. Psychiatric/Behavioral: Negative for confusion. The patient is not nervous/anxious. Objective:     Physical Exam  Constitutional:       Appearance: He is well-developed. HENT:      Head: Normocephalic. Eyes:      Conjunctiva/sclera: Conjunctivae normal.      Pupils: Pupils are equal, round, and reactive to light. Neck:      Musculoskeletal: Normal range of motion. Cardiovascular:      Rate and Rhythm: Normal rate and regular rhythm. Heart sounds: Normal heart sounds. No murmur. Pulmonary:      Effort: Pulmonary effort is normal.      Breath sounds: Normal breath sounds. No wheezing. Abdominal:      General: Bowel sounds are normal. There is no distension. Palpations: Abdomen is soft. Musculoskeletal: Normal range of motion. Skin:     General: Skin is warm and dry. Neurological:      Mental Status: He is alert and oriented to person, place, and time. Psychiatric:         Behavior: Behavior normal.         Thought Content: Thought content normal.         Judgment: Judgment normal.       /82   Pulse 88   Temp 97 °F (36.1 °C)   Ht 5' 9\" (1.753 m)   Wt 170 lb 6.4 oz (77.3 kg)   SpO2 98%   BMI 25.16 kg/m²     Assessment:       Diagnosis Orders   1.  Essential hypertension  Comprehensive Metabolic Panel    Lipid Panel 2. Need for immunization against influenza  INFLUENZA, QUADV, ADJUVANTED, 65 YRS =, IM, PF, PREFILL SYR, 0.5ML (FLUAD)   3. Gastroesophageal reflux disease  pantoprazole (PROTONIX) 40 MG tablet   4. Moderate persistent asthma without complication  fluticasone (FLOVENT HFA) 110 MCG/ACT inhaler   5. Screening for malignant neoplasm of prostate  Psa screening   6. Screening, anemia, deficiency, iron  CBC Auto Differential             Plan:      Return in about 1 year (around 1/5/2022) for annual visit . Hypertensionhas stopped medication per his choice, again emphasized risks of uncontrolled elevated blood pressure given his past history of CAD. In spite of discussion he refuses to resume medications at this time. Will check labs. Encouraged healthy diet choices and to continue regular exercise  GERDwell-controlled with daily PPI and avoidance of triggers   Asthmastable on steroid inhaler    Orders Placed This Encounter   Procedures    INFLUENZA, QUADV, ADJUVANTED, 65 YRS =, IM, PF, PREFILL SYR, 0.5ML (FLUAD)    CBC Auto Differential     Standing Status:   Future     Standing Expiration Date:   1/5/2022    Comprehensive Metabolic Panel     Standing Status:   Future     Standing Expiration Date:   1/5/2022    Lipid Panel     Standing Status:   Future     Standing Expiration Date:   1/5/2022     Order Specific Question:   Is Patient Fasting?/# of Hours     Answer:   8    Psa screening     Standing Status:   Future     Standing Expiration Date:   1/5/2022        Orders Placed This Encounter   Medications    pantoprazole (PROTONIX) 40 MG tablet     Sig: Take 1 tablet by mouth daily     Dispense:  90 tablet     Refill:  3    fluticasone (FLOVENT HFA) 110 MCG/ACT inhaler     Sig: INHALE 2 PUFFS INTO THE LUNGS TWICE DAILY.      Dispense:  36 g     Refill:  3 Patient given educational materials - see patient instructions. Discussed use, benefit, and side effects of prescribed medications. All patientquestions answered. Pt voiced understanding. Reviewed health maintenance. Instructedto continue current medications, diet and exercise. Patient agreed with treatmentplan. Follow up as directed.      Electronicallysigned by ISI Tamez CNP on 1/5/2021 at 5:02 PM

## 2021-04-15 ENCOUNTER — NURSE TRIAGE (OUTPATIENT)
Dept: OTHER | Facility: CLINIC | Age: 66
End: 2021-04-15

## 2021-04-15 NOTE — TELEPHONE ENCOUNTER
Reason for Disposition   Chest pain lasting longer than 5 minutes and occurred in last 3 days (72 hours) (Exception: feels exactly the same as previously diagnosed heartburn and has accompanying sour taste in mouth)    Answer Assessment - Initial Assessment Questions  1. LOCATION: \"Where does it hurt? \"        Mild discomfort on the left breastbone area    2. RADIATION: \"Does the pain go anywhere else? \" (e.g., into neck, jaw, arms, back)      Sometimes the left arm feels numb    3. ONSET: \"When did the chest pain begin? \" (Minutes, hours or days)       Noticed it beginning in February    4. PATTERN \"Does the pain come and go, or has it been constant since it started? \"  \"Does it get worse with exertion? \"       Comes and goes, gets worse with exertion    5. DURATION: \"How long does it last\" (e.g., seconds, minutes, hours)      Patient states that time of duration varies    6. SEVERITY: \"How bad is the pain? \"  (e.g., Scale 1-10; mild, moderate, or severe)     - MILD (1-3): doesn't interfere with normal activities      - MODERATE (4-7): interferes with normal activities or awakens from sleep     - SEVERE (8-10): excruciating pain, unable to do any normal activities        Mild, patient rates pain at 3 or 4    7. CARDIAC RISK FACTORS: \"Do you have any history of heart problems or risk factors for heart disease? \" (e.g., angina, prior heart attack; diabetes, high blood pressure, high cholesterol, smoker, or strong family history of heart disease)      Has had one heart of attack. Has two stents. High blood pressure. Never smoked. Mother had heart attack. 8. PULMONARY RISK FACTORS: \"Do you have any history of lung disease? \"  (e.g., blood clots in lung, asthma, emphysema, birth control pills)      Patient states that he may have had asthma, but it only bothers him seasonally. 9. CAUSE: \"What do you think is causing the chest pain? \"      Patient states that he is unsure, states that it does not feel like it is a

## 2021-08-23 ENCOUNTER — HOSPITAL ENCOUNTER (OUTPATIENT)
Dept: CARDIAC CATH/INVASIVE PROCEDURES | Age: 66
Discharge: HOME | End: 2021-08-23
Payer: COMMERCIAL

## 2021-08-23 VITALS
HEIGHT: 71 IN | SYSTOLIC BLOOD PRESSURE: 114 MMHG | RESPIRATION RATE: 19 BRPM | BODY MASS INDEX: 22.68 KG/M2 | WEIGHT: 162 LBS | DIASTOLIC BLOOD PRESSURE: 72 MMHG | HEART RATE: 59 BPM | OXYGEN SATURATION: 98 % | TEMPERATURE: 97.5 F

## 2021-08-23 LAB
GFR NON-AFRICAN AMERICAN: >60 ML/MIN
GFR SERPL CREATININE-BSD FRML MDRD: >60 ML/MIN
GFR SERPL CREATININE-BSD FRML MDRD: NORMAL ML/MIN/{1.73_M2}
GLUCOSE BLD-MCNC: 111 MG/DL (ref 74–100)
LV EF: 52 %
LVEF MODALITY: NORMAL
PLATELET # BLD: 238 K/UL (ref 138–453)
POC BUN: 24 MG/DL (ref 8–26)
POC CHLORIDE: 106 MMOL/L (ref 98–107)
POC CREATININE: 0.87 MG/DL (ref 0.51–1.19)
POC HEMATOCRIT: 41 % (ref 41–53)
POC HEMOGLOBIN: 13.9 G/DL (ref 13.5–17.5)
POC POTASSIUM: 3.7 MMOL/L (ref 3.5–4.5)
POC SODIUM: 140 MMOL/L (ref 138–146)

## 2021-08-23 PROCEDURE — 99152 MOD SED SAME PHYS/QHP 5/>YRS: CPT

## 2021-08-23 PROCEDURE — 82565 ASSAY OF CREATININE: CPT

## 2021-08-23 PROCEDURE — 82435 ASSAY OF BLOOD CHLORIDE: CPT

## 2021-08-23 PROCEDURE — C1887 CATHETER, GUIDING: HCPCS

## 2021-08-23 PROCEDURE — 6360000004 HC RX CONTRAST MEDICATION

## 2021-08-23 PROCEDURE — 93458 L HRT ARTERY/VENTRICLE ANGIO: CPT | Performed by: INTERNAL MEDICINE

## 2021-08-23 PROCEDURE — 84132 ASSAY OF SERUM POTASSIUM: CPT

## 2021-08-23 PROCEDURE — 7100000000 HC PACU RECOVERY - FIRST 15 MIN

## 2021-08-23 PROCEDURE — 84520 ASSAY OF UREA NITROGEN: CPT

## 2021-08-23 PROCEDURE — 2500000003 HC RX 250 WO HCPCS

## 2021-08-23 PROCEDURE — 6360000002 HC RX W HCPCS

## 2021-08-23 PROCEDURE — 93880 EXTRACRANIAL BILAT STUDY: CPT

## 2021-08-23 PROCEDURE — 2709999900 HC NON-CHARGEABLE SUPPLY

## 2021-08-23 PROCEDURE — C1894 INTRO/SHEATH, NON-LASER: HCPCS

## 2021-08-23 PROCEDURE — 82947 ASSAY GLUCOSE BLOOD QUANT: CPT

## 2021-08-23 PROCEDURE — 93356 MYOCRD STRAIN IMG SPCKL TRCK: CPT

## 2021-08-23 PROCEDURE — 99204 OFFICE O/P NEW MOD 45 MIN: CPT | Performed by: NURSE PRACTITIONER

## 2021-08-23 PROCEDURE — 7100000001 HC PACU RECOVERY - ADDTL 15 MIN

## 2021-08-23 PROCEDURE — 93306 TTE W/DOPPLER COMPLETE: CPT

## 2021-08-23 PROCEDURE — 93005 ELECTROCARDIOGRAM TRACING: CPT | Performed by: INTERNAL MEDICINE

## 2021-08-23 PROCEDURE — 84295 ASSAY OF SERUM SODIUM: CPT

## 2021-08-23 PROCEDURE — 85014 HEMATOCRIT: CPT

## 2021-08-23 PROCEDURE — 85049 AUTOMATED PLATELET COUNT: CPT

## 2021-08-23 RX ORDER — AMLODIPINE BESYLATE 5 MG/1
5 TABLET ORAL DAILY
COMMUNITY
End: 2021-08-23 | Stop reason: HOSPADM

## 2021-08-23 RX ORDER — SODIUM CHLORIDE 9 MG/ML
INJECTION, SOLUTION INTRAVENOUS CONTINUOUS
Status: DISCONTINUED | OUTPATIENT
Start: 2021-08-23 | End: 2021-08-24 | Stop reason: HOSPADM

## 2021-08-23 RX ORDER — ASPIRIN 81 MG/1
81 TABLET ORAL DAILY
COMMUNITY

## 2021-08-23 RX ORDER — ATORVASTATIN CALCIUM 40 MG/1
40 TABLET, FILM COATED ORAL DAILY
Status: ON HOLD | COMMUNITY
End: 2021-09-02 | Stop reason: HOSPADM

## 2021-08-23 RX ADMIN — SODIUM CHLORIDE: 9 INJECTION, SOLUTION INTRAVENOUS at 07:44

## 2021-08-23 NOTE — PROGRESS NOTES
Received post cath,right wrist puncture site intact. wife present, taking fluids and eating sandwich.

## 2021-08-23 NOTE — CONSULTS
[facility] Cardiothoracic Surgery  Consult    Patient's Name/Date of Birth: Ofe Spencer / 1955 (39 y.o.)    Date: August 23, 2021     Chief Complaint: MV CAD-stable angina    HPI: Ofe Spencer is a 76 y.o.  male who presented to [facility] today for cardiac catheterization. Since the winter patient has had small episodes of stable angina with shoveling snow and lifting boxes. Patient has a history of cardiac stents in 2006. Does not take any AC other than aspirin. Patient has no history of paroxysmal A. fib that he is aware of. Today patient denies any recent chest pain or shortness of breath. He is resting comfortable in bed. He does have a strong history of heart disease in the family on his father side. ROS:   CONSTITUTIONAL:  A&0x4  Respiratory: negative  Cardiovascular: negative  Gastrointestinal: negative  Genitourinary:negative  Hematologic/lymphatic: negative  Musculoskeletal:negative  Neurological: negative  Endocrine: negative   Psych. No issues.  Normal affect    Past Medical History:   Diagnosis Date    Allergic rhinitis     Asthma     CAD (coronary artery disease)     MI 7/26/2006; 2 stents    Hypertension     Irritant contact dermatitis due to plants, except food 8/17/2017     Past Surgical History:   Procedure Laterality Date    CARDIAC CATHETERIZATION  08/23/2021    COLONOSCOPY  2004    HERNIA REPAIR       Allergies   Allergen Reactions    Cephalexin      Family History   Problem Relation Age of Onset    Stroke Father 67     Social History     Socioeconomic History    Marital status:      Spouse name: Not on file    Number of children: Not on file    Years of education: Not on file    Highest education level: Not on file   Occupational History    Not on file   Tobacco Use    Smoking status: Never Smoker    Smokeless tobacco: Never Used   Substance and Sexual Activity    Alcohol use: No    Drug use: No    Sexual activity: Not on file   Other Topics Concern    Not on file   Social History Narrative    Not on file     Social Determinants of Health     Financial Resource Strain:     Difficulty of Paying Living Expenses:    Food Insecurity:     Worried About Running Out of Food in the Last Year:     [address] in the Last Year:    Transportation Needs:     Lack of Transportation (Medical):  Lack of Transportation (Non-Medical):    Physical Activity:     Days of Exercise per Week:     Minutes of Exercise per Session:    Stress:     Feeling of Stress :    Social Connections:     Frequency of Communication with Friends and Family:     Frequency of Social Gatherings with Friends and Family:     Attends Worship Services:     Active Member of Clubs or Organizations:     Attends Club or Organization Meetings:     Marital Status:    Intimate Partner Violence:     Fear of Current or Ex-Partner:     Emotionally Abused:     Physically Abused:     Sexually Abused:        Current Outpatient Medications   Medication Sig Dispense Refill    atorvastatin (LIPITOR) 40 MG tablet Take 40 mg by mouth daily      aspirin 81 MG EC tablet Take 81 mg by mouth daily      metoprolol tartrate (LOPRESSOR) 25 MG tablet Take 1 tablet by mouth 2 times daily 30 tablet 1    pantoprazole (PROTONIX) 40 MG tablet Take 1 tablet by mouth daily 90 tablet 3    fluticasone (FLOVENT HFA) 110 MCG/ACT inhaler INHALE 2 PUFFS INTO THE LUNGS TWICE DAILY. 36 g 3    LORATADINE PO Take by mouth      fluticasone (FLONASE) 50 MCG/ACT nasal spray USE 2 SPRAYS NASALLY AS DIRECTED ONCE DAILY.  48 g 5    ibuprofen (ADVIL;MOTRIN) 800 MG tablet Take 1 tablet by mouth every 6 hours as needed for Pain 90 tablet 0     Current Facility-Administered Medications   Medication Dose Route Frequency Provider Last Rate Last Admin    0.9 % sodium chloride infusion   Intravenous Continuous Teofilo Franco MD 75 mL/hr at 08/23/21 0744 New Bag at 08/23/21 0744       Physical Exam:  Vitals:    08/23/21 7012 BP: (!) 140/94   Pulse: 70   Resp: 18   Temp: 97.5 °F (36.4 °C)   SpO2: 98%     Weight: Weight: 162 lb (73.5 kg)    Weight: 162 lb (73.5 kg)        General: Alert and Oriented x3. Sitting up in bed. No apparent distress. HEENT:  Normocephalic and atraumatic. PERRL. EOMI. Lips and oral mucosa moist and without lesions. Neck:  Supple. Trachea midline. Chest:  No abnormality. Equal and symmetric expansion with respiration. Lungs:  Clear to auscultation. Cardiac:  Regular rate and rhythm without murmurs, rubs or gallops. Abdomen:  Soft, non-tender, normoactive bowel sounds. No masses or organomegaly. Extremities:  No cyanosis, clubbing, or edema. Intact pulses in all four extremities. Musculoskeletal:  Intact range of motion of peripheral joints. Normal muscular strength. Neurologic:  Cranial nerves are grossly intact. Non-focal sensory deficits on exam.  Psychiatric: Mood and affect are appropriate. Imaging Studies:    Cardiac Cath:   LMCA: Normal 0% stenosis.     LAD: Mid area at D2 75% stenosis  D2: Ostial 60% stenosis     LCx: Mid 100% stenosis in stent  OM: seen filling by collateral     RCA: Dominant  Ostial 50% stenosis  PDA: minimal disease  PLV1: Ostial 90% stenosis     Ramus: upper branch with no disease  Lower branch 80% proximal area      Coronary Tree      Dominance: Right    Echo:NO Echo at this time    CT:NO CT at this time        Assessment & Plan:  Patient Active Problem List   Diagnosis    Allergic rhinitis    Asthma    Hypertension    CAD (coronary artery disease)    Fatigue    Depression    Irritant contact dermatitis due to plants, except food       Risks Reviewed w/pt  - Risk for stroke: Yes  - Risk for bleeding:Yes  - Risk for cardiac arrythmias: Yes  - Small risk of death: Yes  - Risks of pneumonia from ventilator: Yes  - Risk for infection: Yes    PLAN:  While in [facility], pt will get echo complete and carotids completed.   He will come back to [facility] on Wednesday at

## 2021-08-23 NOTE — PROGRESS NOTES
Vasc band removed and DSD and armboard placed on right wrist. Discharge teaching completed with wife present.

## 2021-08-23 NOTE — H&P
[facility]  Procedure History and Physical Update          Patient Name: Gaudencio Recio  MRN:    [id number]  YOB: 1955  Date of evaluation:  8/23/2021    Procedure:    Cardiac cath +/- PCI    Indication for procedure:  Abnormal stress test      Please refer to the office note completed by Dr. Sen Chua on 8/20/2021 in the medical record and note that:    [x] I have examined the patient and reviewed the H&P/Consult and there are no changes to be made to the assessment or plan. [] I have examined the patient and reviewed the H&P/Consult and have noted the following changes:              Past Medical History:   Diagnosis Date    Allergic rhinitis     Asthma     CAD (coronary artery disease)     MI 7/26/2006; 2 stents    Hypertension     Irritant contact dermatitis due to plants, except food 8/17/2017       Past Surgical History:   Procedure Laterality Date    CARDIAC CATHETERIZATION  08/23/2021    COLONOSCOPY  2004    HERNIA REPAIR         Family History   Problem Relation Age of Onset    Stroke Father 67       Allergies   Allergen Reactions    Cephalexin        Prior to Admission medications    Medication Sig Start Date End Date Taking? Authorizing Provider   atorvastatin (LIPITOR) 40 MG tablet Take 40 mg by mouth daily   Yes Historical Provider, MD   amLODIPine (NORVASC) 5 MG tablet Take 5 mg by mouth daily   Yes Historical Provider, MD   aspirin 81 MG EC tablet Take 81 mg by mouth daily   Yes Historical Provider, MD   pantoprazole (PROTONIX) 40 MG tablet Take 1 tablet by mouth daily 1/5/21  Yes ISI Mckenzie CNP   fluticasone (FLOVENT HFA) 110 MCG/ACT inhaler INHALE 2 PUFFS INTO THE LUNGS TWICE DAILY. 1/5/21  Yes ISI John CNP   LORATADINE PO Take by mouth   Yes Historical Provider, MD   fluticasone (FLONASE) 50 MCG/ACT nasal spray USE 2 SPRAYS NASALLY AS DIRECTED ONCE DAILY.  1/30/20  Yes ISI Mckenzie CNP   ibuprofen (ADVIL;MOTRIN) 800 MG tablet Take 1 tablet by mouth every 6 hours as needed for Pain 5/7/18  Yes Ree Yen, APRN - CNP         Vitals:    08/23/21 0738   BP: (!) 140/94   Pulse: 70   Resp: 18   Temp: 97.5 °F (36.4 °C)   SpO2: 98%       Constitutional and General Appearance:   alert, cooperative, no distress and appears stated age  HEENT:  · PERRL, EOMI  Respiratory:  · Normal excursion and expansion without use of accessory muscles  · Resp Auscultation:  Good respiratory effort. No for increased work of breathing. On auscultation: clear to auscultation bilaterally  Cardiovascular:  · Regular rate and rhythm. · S1/S2  · No murmurs. · The apical impulse is not displaced  Abdomen:  · Soft  · Bowel sounds present  · Non-tender to palpation  Extremities:  · No cyanosis or clubbing  · Lower extremity edema: No.  Skin:  · Warm and dry  Neurological:  · Alert and oriented. · Moves all extremities well      Plan:  · Proceed with planned procedure. · Further orders to follow. Pre Procedure Conscious Sedation Data:     ASA Class:                  [] I [x] II [] III [] IV     Mallampati Class:       [] I [x] II [] III [] IV    Risks, benefits, and alternatives of cardiac catheterization were discussed, in detail, with patient. Risks include, but not limited to, bleeding, requiring blood transfusion, vascular complication requiring surgery, renal failure with need of dialysis, CVA, MI, death and anesthesia complications including intubation were discussed. Patient verbalized understanding and agreed to proceed with the procedure understanding the above risks and alternatives to the procedure.           Electronically signed on 08/23/21 at 8:24 AM by:    Felice Bailey MD, MD   Fellow, [address]

## 2021-08-23 NOTE — OP NOTE
[facility]    CARDIAC CATHETERIZATION    Date:   8/23/2021  Patient name:  Leslie Weldon  Date of admission:  8/23/2021  7:12 AM  MRN:   [id number]  YOB: 1955    Operators:  Primary:   Raimundo Breen MD (Attending Physician)    Procedure performed:     [x] Left Heart Catheterization. [] Graft Angiography. [x] Left Ventriculography. [] Right Heart Catheterization. [x] Coronary Angiography. [] Aortic Valve Studies. [] PCI:      [] Other:       Pre Procedure Conscious Sedation Data:  ASA Class:    [] I [x] II [] III [] IV    Mallampati Class:  [] I [x] II [] III [] IV      Indication:  [] STEMI      [x] + Stress test  [] ACS      [] + EKG Changes  [] Non Q MI       [] Significant Risk Factors  [] Recurrent Angina             [] Diabetes Mellitus    [] New LBBB      [] Other.  [] CHF / Low EF changes     [] Abnormal CTA / Ca Score      Procedure:  Access:  [] Femoral  [x] Radial  artery       [x] Right  [] Left    Procedure: After informed consent was obtained with explanation of the risks and benefits, patient was brought to the cath lab. The access area was prepped and draped in sterile fashion. 1% lidocaine was used for local block. The artery was cannulated with 6  Fr sheath with brisk arterial blood return. The side port was frequently flushed and aspirated with normal saline. Estimated Blood Loss:  [x] Minimal < 25 cc [] Moderate 25-50 cc  [] >50 cc    Findings:      LMCA: Normal 0% stenosis. LAD: Mid area at D2 75% stenosis   D2: Ostial 60% stenosis     LCx: Mid 100% stenosis in stent   OM: seen filling by collateral     RCA: Dominant   Ostial 50% stenosis   PDA: minimal disease   PLV1: Ostial 90% stenosis     Ramus: upper branch with no disease   Lower branch 80% proximal area     The LV gram was performed in the VALENTIN 30 position. LVEF: 55 %.        Conclusions:     Multivessel CAD    Preserved LV function           Recommendations:   CV surgery consult    If patient not best candidate for CABG will consider LAD and PLV branch    intervention       ____________________________________________________________________    History and Risk Factors    [x] Hypertension     [] Family history of CAD  [x] Hyperlipidemia     [] Cerebrovascular Disease   [] Prior MI       [] Peripheral Vascular disease   [x] Prior PCI              [] Diabetes Mellitus    [] Left Main PCI. [] Currently on Dialysis. [] Prior CABG. [] Currently smoker. [] Cardiac Arrest outside of healthcare facility. [] Yes    [] No        Witnessed     [] Yes   [] No     Arrest after arrival of EMS  [] Yes   [] No     [] Cardiac Arrest at other Facility. [] Yes   [] No    Pre-Procedure Information. Heart Failure       [] Yes    [x] No        Class  [] I      [] II  [] III    [] IV. New Diagnosis    [] Yes  [] No    HF Type      [] Systolic   [] Diastolic          [] Unknown. Diagnostic Test:   EKG       [] Normal   [x] Abnormal    New antiarrhythmia medications:    [] Yes   [] No   New onset atrial fibrillation / Flutter     [] Yes   [] No   ECG Abnormalities:      [] V. Fib   [] Sandi V. Tach           [] NS V. T   [] New LBBB           [] T. Inv  []  ST dev > 0.5 mm         [] PVC's freq  [] PVC's infrequent    Stress Test Performed:      [x] Yes    [] No     Type:     [] Stress Echo   [] Exercise Stress Test (no imaging)      [] Stress Nuclear  [x] Stress Imaging     Results   [] Negative   [] Positive        [] Indeterminate  [] Unavailable     If Positive/ Risk / Extent of Ischemia:       [] Low  [] Intermediate         [] High  [] Unavailable      Cardiac CTA Performed:     [] Yes    [x] No      Results   [] CAD   [] Non obstructive CAD      [] No CAD   [] Uncertain      [] Unknown   [] Structural Disease.      Pre Procedure Medications:   [x] Yes    [] No         [x] ASA   [x] Beta Blockers      [] Nitrate   [] Ca Channel Blockers      [] Ranolazine   [x] Statin       [] Plavix/Others antiplatelets      Electronically signed on 8/23/2021 at 10:15 AM by:    Christelle Rogers MD  Fellow, [address]      I have reviewed the case / procedure with resident / fellow  I have examined the patient personally  Patient agree with treatment plan as discussed before, final arrangement based on my evaluation and exam.    Risk and benefit of procedure planned were explained in details. Procedure was performed by me personally, with all aspect of the procedure being done using standard protocol. Note was modified based on my own assessment and treatment.     Ti Briggs MD  [facility]

## 2021-08-24 LAB
EKG ATRIAL RATE: 72 BPM
EKG P AXIS: 64 DEGREES
EKG P-R INTERVAL: 166 MS
EKG Q-T INTERVAL: 400 MS
EKG QRS DURATION: 92 MS
EKG QTC CALCULATION (BAZETT): 438 MS
EKG R AXIS: 20 DEGREES
EKG T AXIS: 38 DEGREES
EKG VENTRICULAR RATE: 72 BPM

## 2021-08-24 NOTE — PATIENT INSTRUCTIONS
Patient Education        Coronary Artery Bypass: Before Your Surgery  What is coronary artery bypass surgery? Coronary artery bypass is surgery to treat coronary artery disease. It helps blood make a detour, or bypass, around one or more narrowed or blocked coronary arteries. These arteries are the blood vessels that bring blood to the heart. This is also called coronary artery bypass graft (CABG) or bypass surgery. Your doctor will make the bypass with a healthy piece of blood vessel from another part of your body. Then the doctor will attach, or graft, the healthy blood vessel to the narrowed or blocked artery. The new blood vessel bypasses the diseased artery to increase blood flow to the heart muscle. The doctor will make a cut in the skin over your breastbone (sternum). This cut is called an incision. Then the doctor will cut through your sternum to reach your heart and coronary arteries. The doctor may connect you to a heart-lung bypass machine. It adds oxygen to the blood and moves the blood through the body. The machine will allow the doctor to stop your heartbeat while working on your arteries. The doctor will use blood vessels from your chest, arm, or leg to bypass the narrowed or blocked parts of your arteries. When the blood vessels are in place, the doctor will restart your heart. In some cases, the doctor may be able to do the surgery without using a heart-lung machine. This is called \"off-pump\" surgery. The doctor will use wire to put your sternum back together. Stitches or staples will be used to close the incisions in the skin over your sternum and where your healthy blood vessel was taken. The wire will stay in your chest. The incisions will leave scars. They may fade with time. You will stay in the hospital for 3 to 8 days after surgery. You will probably be able to do many of your usual activities after 4 to 6 weeks.  But for 2 to 3 months you will not be able to lift heavy objects or do things that strain your chest or upper arm muscles. At first you may notice that you get tired quickly. You may need to rest often. It may take 1 to 2 months before your energy is back to normal.  Follow-up care is a key part of your treatment and safety. Be sure to make and go to all appointments, and call your doctor if you are having problems. It's also a good idea to know your test results and keep a list of the medicines you take. How do you prepare for surgery? Surgery can be stressful. This information will help you understand what you can expect. And it will help you safely prepare for surgery. Preparing for surgery    · Be sure you have someone to take you home. Anesthesia and pain medicine will make it unsafe for you to drive or get home on your own.     · Understand exactly what surgery is planned, along with the risks, benefits, and other options.     · If you take aspirin or some other blood thinner, ask your doctor if you should stop taking it before your surgery. Make sure that you understand exactly what your doctor wants you to do. These medicines increase the risk of bleeding.     · Tell your doctor ALL the medicines, vitamins, supplements, and herbal remedies you take. Some may increase the risk of problems during your surgery. Your doctor will tell you if you should stop taking any of them before the surgery and how soon to do it.     · Make sure your doctor and the hospital have a copy of your advance directive. If you don't have one, you may want to prepare one. It lets others know your health care wishes. It's a good thing to have before any type of surgery or procedure.     · Do not smoke. Smoking can make your coronary artery disease worse. If you need help quitting, talk to your doctor about stop-smoking programs and medicines. These can increase your chances of quitting for good. What happens on the day of surgery?    · Follow the instructions exactly about when to stop eating and drinking. If you don't, your surgery may be canceled. If your doctor told you to take your medicines on the day of surgery, take them with only a sip of water.     · Take a bath or shower before you come in for your surgery. Do not apply lotions, perfumes, deodorants, or nail polish.     · Do not shave the surgical site yourself.     · Take off all jewelry and piercings. And take out contact lenses, if you wear them. At the hospital or surgery center   · Bring a picture ID.     · The area for surgery is often marked to make sure there are no errors.     · You will be kept comfortable and safe by your anesthesia provider. You will be asleep during the surgery.     · The surgery will take about 3 to 6 hours. This depends on the number of arteries that are bypassed and the type of surgery you have.     · You will go to the intensive care unit (ICU) right after surgery. You will probably stay in the ICU for 1 or 2 days before you go to your regular hospital room.     · You will have a breathing tube down your throat. This is usually removed within 6 hours after surgery. You will not be able to talk or drink liquids while the tube is in your throat. After the tube is removed, your throat will feel dry and scratchy. Your nurse will tell you when it is safe to drink liquids again.     · You will have a thin plastic tube, called a catheter, in a vein in your neck. It is used to keep track of how well your heart is working. This is usually removed in 1 to 3 days.     · You will have chest tubes to drain fluid and blood after surgery. The fluid and extra blood are normal and usually last only a few days. The chest tubes are usually removed in 1 or 2 days.     · You will have several thin wires coming out of your chest near your incision. These wires can help keep your heartbeat steady after surgery. They will be removed before you go home. Where can you learn more? Go to [email or web address] and sign

## 2021-08-25 ENCOUNTER — TELEPHONE (OUTPATIENT)
Dept: CARDIOTHORACIC SURGERY | Age: 66
End: 2021-08-25

## 2021-08-25 ENCOUNTER — INITIAL CONSULT (OUTPATIENT)
Dept: CARDIOTHORACIC SURGERY | Age: 66
End: 2021-08-25
Payer: COMMERCIAL

## 2021-08-25 VITALS
HEIGHT: 70 IN | SYSTOLIC BLOOD PRESSURE: 145 MMHG | BODY MASS INDEX: 24.48 KG/M2 | OXYGEN SATURATION: 98 % | TEMPERATURE: 98.1 F | DIASTOLIC BLOOD PRESSURE: 82 MMHG | RESPIRATION RATE: 18 BRPM | HEART RATE: 61 BPM | WEIGHT: 171 LBS

## 2021-08-25 DIAGNOSIS — I25.10 CORONARY ARTERY DISEASE INVOLVING NATIVE HEART WITHOUT ANGINA PECTORIS, UNSPECIFIED VESSEL OR LESION TYPE: Primary | Chronic | ICD-10-CM

## 2021-08-25 PROCEDURE — 3017F COLORECTAL CA SCREEN DOC REV: CPT | Performed by: NURSE PRACTITIONER

## 2021-08-25 PROCEDURE — 1036F TOBACCO NON-USER: CPT | Performed by: NURSE PRACTITIONER

## 2021-08-25 PROCEDURE — 4040F PNEUMOC VAC/ADMIN/RCVD: CPT | Performed by: NURSE PRACTITIONER

## 2021-08-25 PROCEDURE — 99214 OFFICE O/P EST MOD 30 MIN: CPT | Performed by: NURSE PRACTITIONER

## 2021-08-25 PROCEDURE — 1123F ACP DISCUSS/DSCN MKR DOCD: CPT | Performed by: NURSE PRACTITIONER

## 2021-08-25 PROCEDURE — G8420 CALC BMI NORM PARAMETERS: HCPCS | Performed by: NURSE PRACTITIONER

## 2021-08-25 PROCEDURE — G8427 DOCREV CUR MEDS BY ELIG CLIN: HCPCS | Performed by: NURSE PRACTITIONER

## 2021-08-25 NOTE — PROGRESS NOTES
[facility]        Subjective:  Mr. Evia Scheuermann is a 76 y.o. male patient is a follow-up from Mondays consult to me Dr. Eugenio Guerrier and schedule CABG surgery. Dr. Eugenio Guerrier reviewed imaging and echocardiogram.  Patient currently has no chest pain or shortness of breath. Patient asymptomatic. Physical Exam  Vitals:  Vitals:    08/25/21 1005   BP: (!) 145/82   Pulse:    Resp:    Temp:    SpO2:        General: Alert and Oriented x3. Ambulatory. No apparent distress. Chest:  No abnormality. Equal and symmetric expansion with respiration. Lungs:  Clear to auscultation. Cardiac:  Regular rate and rhythm without murmurs, rubs or gallops. Abdomen:  Soft, non-tender, normoactive bowel sounds. Extremities:  No edema. Intact pulses in all four extremities. Psychiatric: Mood and affect are appropriate. We evaluated carotids which were normal.  We evaluated cardiac catheterization. Evaluated echocardiogram.    Current Medications:    Current Outpatient Medications:     atorvastatin (LIPITOR) 40 MG tablet, Take 40 mg by mouth daily, Disp: , Rfl:     aspirin 81 MG EC tablet, Take 81 mg by mouth daily, Disp: , Rfl:     metoprolol tartrate (LOPRESSOR) 25 MG tablet, Take 0.5 tablets by mouth 2 times daily Discussed with the patient and his wife to hold if heart rate less than 60., Disp: 30 tablet, Rfl: 1    pantoprazole (PROTONIX) 40 MG tablet, Take 1 tablet by mouth daily, Disp: 90 tablet, Rfl: 3    fluticasone (FLOVENT HFA) 110 MCG/ACT inhaler, INHALE 2 PUFFS INTO THE LUNGS TWICE DAILY. , Disp: 36 g, Rfl: 3    LORATADINE PO, Take by mouth, Disp: , Rfl:     fluticasone (FLONASE) 50 MCG/ACT nasal spray, USE 2 SPRAYS NASALLY AS DIRECTED ONCE DAILY. , Disp: 48 g, Rfl: 5    ibuprofen (ADVIL;MOTRIN) 800 MG tablet, Take 1 tablet by mouth every 6 hours as needed for Pain, Disp: 90 tablet, Rfl: 0    Past Surgical History:   Procedure Laterality Date    CARDIAC CATHETERIZATION  08/23/2021    COLONOSCOPY  2004    HERNIA REPAIR         Social Hx: reports that he has never smoked. He has never used smokeless tobacco.    Assessment & Plan: We will schedule for CABG X3 with potential endo-radial harvest for Monday 8-30-21 at 815am with dibardino  Needs Ct chest  Needs vein mapping and art.  Mapping  Needs PAT labs and imaging  covid vaccinated        [address] [facility], APRN - NP,APRN CNP

## 2021-08-25 NOTE — TELEPHONE ENCOUNTER
LMORTCB regarding the additional testing that Tom Hadley ordered. Vein mapping [facility] at 12:30.  CT of chest at [facility] 2:30 pm    Pt notified and voiced understanding

## 2021-08-26 ENCOUNTER — ANESTHESIA EVENT (OUTPATIENT)
Dept: OPERATING ROOM | Age: 66
DRG: 236 | End: 2021-08-26
Payer: COMMERCIAL

## 2021-08-26 RX ORDER — SODIUM CHLORIDE, SODIUM LACTATE, POTASSIUM CHLORIDE, CALCIUM CHLORIDE 600; 310; 30; 20 MG/100ML; MG/100ML; MG/100ML; MG/100ML
1000 INJECTION, SOLUTION INTRAVENOUS CONTINUOUS
Status: CANCELLED | OUTPATIENT
Start: 2021-08-26

## 2021-08-27 ENCOUNTER — HOSPITAL ENCOUNTER (OUTPATIENT)
Dept: VASCULAR LAB | Age: 66
Discharge: HOME | DRG: 236 | End: 2021-08-27
Payer: COMMERCIAL

## 2021-08-27 ENCOUNTER — HOSPITAL ENCOUNTER (OUTPATIENT)
Dept: PREADMISSION TESTING | Age: 66
Setting detail: SURGERY ADMIT
Discharge: HOME | DRG: 236 | End: 2021-08-31
Payer: COMMERCIAL

## 2021-08-27 ENCOUNTER — HOSPITAL ENCOUNTER (OUTPATIENT)
Dept: GENERAL RADIOLOGY | Age: 66
Discharge: HOME | DRG: 236 | End: 2021-08-29
Payer: COMMERCIAL

## 2021-08-27 ENCOUNTER — HOSPITAL ENCOUNTER (OUTPATIENT)
Dept: CT IMAGING | Age: 66
Discharge: HOME | DRG: 236 | End: 2021-08-29
Payer: COMMERCIAL

## 2021-08-27 VITALS
OXYGEN SATURATION: 98 % | DIASTOLIC BLOOD PRESSURE: 81 MMHG | BODY MASS INDEX: 22.82 KG/M2 | TEMPERATURE: 98.1 F | RESPIRATION RATE: 18 BRPM | WEIGHT: 163 LBS | SYSTOLIC BLOOD PRESSURE: 149 MMHG | HEART RATE: 52 BPM | HEIGHT: 71 IN

## 2021-08-27 DIAGNOSIS — I25.10 CORONARY ARTERY DISEASE INVOLVING NATIVE HEART WITHOUT ANGINA PECTORIS, UNSPECIFIED VESSEL OR LESION TYPE: Chronic | ICD-10-CM

## 2021-08-27 LAB
ABSOLUTE EOS #: 0.08 K/UL (ref 0–0.44)
ABSOLUTE IMMATURE GRANULOCYTE: 0.03 K/UL (ref 0–0.3)
ABSOLUTE LYMPH #: 1.58 K/UL (ref 1.1–3.7)
ABSOLUTE MONO #: 0.57 K/UL (ref 0.1–1.2)
ALBUMIN SERPL-MCNC: 4.6 G/DL (ref 3.5–5.2)
ALBUMIN/GLOBULIN RATIO: 2.2 (ref 1–2.5)
ALLEN TEST: POSITIVE
ALP BLD-CCNC: 75 U/L (ref 40–129)
ALT SERPL-CCNC: 16 U/L (ref 5–41)
ANION GAP SERPL CALCULATED.3IONS-SCNC: 13 MMOL/L (ref 9–17)
AST SERPL-CCNC: 23 U/L
BASOPHILS # BLD: 0 % (ref 0–2)
BASOPHILS ABSOLUTE: <0.03 K/UL (ref 0–0.2)
BILIRUB SERPL-MCNC: 0.84 MG/DL (ref 0.3–1.2)
BILIRUBIN URINE: NEGATIVE
BUN BLDV-MCNC: 15 MG/DL (ref 8–23)
BUN/CREAT BLD: NORMAL (ref 9–20)
CALCIUM SERPL-MCNC: 9.2 MG/DL (ref 8.6–10.4)
CHLORIDE BLD-SCNC: 103 MMOL/L (ref 98–107)
CO2: 23 MMOL/L (ref 20–31)
COLOR: YELLOW
COMMENT UA: NORMAL
CREAT SERPL-MCNC: 0.91 MG/DL (ref 0.7–1.2)
DIFFERENTIAL TYPE: ABNORMAL
EKG ATRIAL RATE: 54 BPM
EKG P AXIS: 63 DEGREES
EKG P-R INTERVAL: 184 MS
EKG Q-T INTERVAL: 420 MS
EKG QRS DURATION: 86 MS
EKG QTC CALCULATION (BAZETT): 398 MS
EKG R AXIS: 25 DEGREES
EKG T AXIS: 47 DEGREES
EKG VENTRICULAR RATE: 54 BPM
EOSINOPHILS RELATIVE PERCENT: 1 % (ref 1–4)
ESTIMATED AVERAGE GLUCOSE: 108 MG/DL
FIO2: 21
GFR AFRICAN AMERICAN: >60 ML/MIN
GFR NON-AFRICAN AMERICAN: >60 ML/MIN
GFR SERPL CREATININE-BSD FRML MDRD: NORMAL ML/MIN/{1.73_M2}
GFR SERPL CREATININE-BSD FRML MDRD: NORMAL ML/MIN/{1.73_M2}
GLUCOSE BLD-MCNC: 95 MG/DL (ref 70–99)
GLUCOSE URINE: NEGATIVE
HBA1C MFR BLD: 5.4 % (ref 4–6)
HCT VFR BLD CALC: 43.8 % (ref 40.7–50.3)
HEMOGLOBIN: 14.2 G/DL (ref 13–17)
IMMATURE GRANULOCYTES: 1 %
INR BLD: 0.9
KETONES, URINE: NEGATIVE
LEUKOCYTE ESTERASE, URINE: NEGATIVE
LYMPHOCYTES # BLD: 26 % (ref 24–43)
MCH RBC QN AUTO: 31.9 PG (ref 25.2–33.5)
MCHC RBC AUTO-ENTMCNC: 32.4 G/DL (ref 28.4–34.8)
MCV RBC AUTO: 98.4 FL (ref 82.6–102.9)
MODE: NORMAL
MONOCYTES # BLD: 10 % (ref 3–12)
NEGATIVE BASE EXCESS, ART: NORMAL (ref 0–2)
NITRITE, URINE: NEGATIVE
NRBC AUTOMATED: 0 PER 100 WBC
O2 DEVICE/FLOW/%: NORMAL
PARTIAL THROMBOPLASTIN TIME: 23.8 SEC (ref 20.5–30.5)
PATIENT TEMP: NORMAL
PDW BLD-RTO: 11.4 % (ref 11.8–14.4)
PH UA: 5 (ref 5–8)
PLATELET # BLD: 246 K/UL (ref 138–453)
PLATELET ESTIMATE: ABNORMAL
PMV BLD AUTO: 10.4 FL (ref 8.1–13.5)
POC HCO3: 25.3 MMOL/L (ref 21–28)
POC O2 SATURATION: 97 % (ref 94–98)
POC PCO2 TEMP: NORMAL MM HG
POC PCO2: 41.9 MM HG (ref 35–48)
POC PH TEMP: NORMAL
POC PH: 7.39 (ref 7.35–7.45)
POC PO2 TEMP: NORMAL MM HG
POC PO2: 88.4 MM HG (ref 83–108)
POSITIVE BASE EXCESS, ART: 0 (ref 0–3)
POTASSIUM SERPL-SCNC: 4.2 MMOL/L (ref 3.7–5.3)
PROTEIN UA: NEGATIVE
PROTHROMBIN TIME: 9.8 SEC (ref 9.1–12.3)
RBC # BLD: 4.45 M/UL (ref 4.21–5.77)
RBC # BLD: ABNORMAL 10*6/UL
SAMPLE SITE: NORMAL
SEG NEUTROPHILS: 62 % (ref 36–65)
SEGMENTED NEUTROPHILS ABSOLUTE COUNT: 3.75 K/UL (ref 1.5–8.1)
SODIUM BLD-SCNC: 139 MMOL/L (ref 135–144)
SPECIFIC GRAVITY UA: 1.02 (ref 1–1.03)
TCO2 (CALC), ART: NORMAL MMOL/L (ref 22–29)
TOTAL PROTEIN: 6.7 G/DL (ref 6.4–8.3)
TURBIDITY: CLEAR
URINE HGB: NEGATIVE
UROBILINOGEN, URINE: NORMAL
WBC # BLD: 6 K/UL (ref 3.5–11.3)
WBC # BLD: ABNORMAL 10*3/UL

## 2021-08-27 PROCEDURE — 82803 BLOOD GASES ANY COMBINATION: CPT

## 2021-08-27 PROCEDURE — 36415 COLL VENOUS BLD VENIPUNCTURE: CPT

## 2021-08-27 PROCEDURE — 93970 EXTREMITY STUDY: CPT

## 2021-08-27 PROCEDURE — 93010 ELECTROCARDIOGRAM REPORT: CPT | Performed by: INTERNAL MEDICINE

## 2021-08-27 PROCEDURE — 36600 WITHDRAWAL OF ARTERIAL BLOOD: CPT

## 2021-08-27 PROCEDURE — 86901 BLOOD TYPING SEROLOGIC RH(D): CPT

## 2021-08-27 PROCEDURE — 83036 HEMOGLOBIN GLYCOSYLATED A1C: CPT

## 2021-08-27 PROCEDURE — 86920 COMPATIBILITY TEST SPIN: CPT

## 2021-08-27 PROCEDURE — 85730 THROMBOPLASTIN TIME PARTIAL: CPT

## 2021-08-27 PROCEDURE — 93931 UPPER EXTREMITY STUDY: CPT

## 2021-08-27 PROCEDURE — 85610 PROTHROMBIN TIME: CPT

## 2021-08-27 PROCEDURE — 71250 CT THORAX DX C-: CPT

## 2021-08-27 PROCEDURE — 93005 ELECTROCARDIOGRAM TRACING: CPT | Performed by: THORACIC SURGERY (CARDIOTHORACIC VASCULAR SURGERY)

## 2021-08-27 PROCEDURE — 80053 COMPREHEN METABOLIC PANEL: CPT

## 2021-08-27 PROCEDURE — 86900 BLOOD TYPING SEROLOGIC ABO: CPT

## 2021-08-27 PROCEDURE — 87086 URINE CULTURE/COLONY COUNT: CPT

## 2021-08-27 PROCEDURE — 71046 X-RAY EXAM CHEST 2 VIEWS: CPT

## 2021-08-27 PROCEDURE — 85025 COMPLETE CBC W/AUTO DIFF WBC: CPT

## 2021-08-27 PROCEDURE — 81003 URINALYSIS AUTO W/O SCOPE: CPT

## 2021-08-27 PROCEDURE — 87641 MR-STAPH DNA AMP PROBE: CPT

## 2021-08-27 PROCEDURE — 86850 RBC ANTIBODY SCREEN: CPT

## 2021-08-27 ASSESSMENT — PAIN DESCRIPTION - PAIN TYPE: TYPE: CHRONIC PAIN

## 2021-08-27 ASSESSMENT — PAIN DESCRIPTION - DESCRIPTORS: DESCRIPTORS: DISCOMFORT

## 2021-08-27 ASSESSMENT — PAIN DESCRIPTION - ORIENTATION: ORIENTATION: MID

## 2021-08-27 ASSESSMENT — PAIN DESCRIPTION - LOCATION: LOCATION: CHEST

## 2021-08-27 ASSESSMENT — PAIN SCALES - GENERAL: PAINLEVEL_OUTOF10: 1

## 2021-08-27 NOTE — PROGRESS NOTES
Anesthesia Focused Assessment    Hx of anesthesia complications:  no  Family hx of anesthesia complications:  no      Prior + Covid-19 test? no      STOP-BANG Sleep Apnea Questionnaire    SNORE loudly (heard through closed doors)? No  TIRED, fatigued, sleepy during daytime? No  OBSERVED stopping breathing during sleep? No  High blood PRESSURE or being treated? Yes    BMI over 35? No  AGE over 48? Yes  NECK circumference over 16\"? No  GENDER (male)? Yes             Total 3  High risk 5-8  Intermediate risk 3-4  Low risk 0-2    ----------------------------------------------------------------------------------------------------------------------  LEIDY                              No  If yes, machine? DM1                                            No  DM2                   No    Coronary Artery Disease      Yes  HTN         Yes  Defib/AICD/Pacemaker               No             Renal Failure                   No  If yes, on dialysis           Active smoker? No  Drinks alcohol? No  Illicit drugs? No  Dentition?        benign      Past Medical History:   Diagnosis Date    Allergic rhinitis     Asthma     inhaler    CAD (coronary artery disease)     MI 7/26/2006; 2 stents. Cardiology: Dr Padma Tilley [facility] maintenance     PCP:  Eulogio ALVES   last seen 1/2021    Hypertension     Inguinal hernia     Irritant contact dermatitis due to plants, except food 08/17/2017    MI (myocardial infarction) [facility]) 2006         Patient was evaluated in PAT & anesthesia guidelines were applied. NPO guidelines, medication instructions and scheduled arrival time were reviewed with patient.                                                                                                                      Anesthesia contacted:   no    Medical or cardiac clearance ordered: no    ISI Llamas CNP   8/27/21  12:00 PM

## 2021-08-28 LAB
CULTURE: NO GROWTH
Lab: NORMAL
MRSA, DNA, NASAL: NORMAL
SPECIMEN DESCRIPTION: NORMAL
SPECIMEN DESCRIPTION: NORMAL

## 2021-08-29 PROCEDURE — APPNB30 APP NON BILLABLE TIME 0-30 MINS: Performed by: NURSE PRACTITIONER

## 2021-08-29 ASSESSMENT — ENCOUNTER SYMPTOMS
RESPIRATORY NEGATIVE: 1
ALLERGIC/IMMUNOLOGIC NEGATIVE: 1
GASTROINTESTINAL NEGATIVE: 1
EYES NEGATIVE: 1

## 2021-08-30 ENCOUNTER — HOSPITAL ENCOUNTER (INPATIENT)
Age: 66
LOS: 3 days | Discharge: HOME | DRG: 236 | End: 2021-09-02
Attending: THORACIC SURGERY (CARDIOTHORACIC VASCULAR SURGERY) | Admitting: THORACIC SURGERY (CARDIOTHORACIC VASCULAR SURGERY)
Payer: COMMERCIAL

## 2021-08-30 ENCOUNTER — APPOINTMENT (OUTPATIENT)
Dept: GENERAL RADIOLOGY | Age: 66
DRG: 236 | End: 2021-08-30
Attending: THORACIC SURGERY (CARDIOTHORACIC VASCULAR SURGERY)
Payer: COMMERCIAL

## 2021-08-30 ENCOUNTER — ANESTHESIA (OUTPATIENT)
Dept: OPERATING ROOM | Age: 66
DRG: 236 | End: 2021-08-30
Payer: COMMERCIAL

## 2021-08-30 VITALS — OXYGEN SATURATION: 100 % | DIASTOLIC BLOOD PRESSURE: 107 MMHG | SYSTOLIC BLOOD PRESSURE: 123 MMHG | TEMPERATURE: 79.6 F

## 2021-08-30 DIAGNOSIS — G89.18 POST-OP PAIN: ICD-10-CM

## 2021-08-30 DIAGNOSIS — I25.10 CAD IN NATIVE ARTERY: Primary | ICD-10-CM

## 2021-08-30 LAB
ALLEN TEST: ABNORMAL
ANION GAP SERPL CALCULATED.3IONS-SCNC: 19 MMOL/L (ref 9–17)
ANION GAP: 12 MMOL/L (ref 7–16)
BUN BLDV-MCNC: 16 MG/DL (ref 8–23)
BUN/CREAT BLD: ABNORMAL (ref 9–20)
CALCIUM IONIZED: 1.17 MMOL/L (ref 1.13–1.33)
CALCIUM IONIZED: 1.33 MMOL/L (ref 1.13–1.33)
CALCIUM SERPL-MCNC: 9.7 MG/DL (ref 8.6–10.4)
CHLORIDE BLD-SCNC: 106 MMOL/L (ref 98–107)
CO2: 14 MMOL/L (ref 20–31)
CREAT SERPL-MCNC: 0.78 MG/DL (ref 0.7–1.2)
FIO2: 100
FIO2: 40
FIO2: ABNORMAL
GFR AFRICAN AMERICAN: >60 ML/MIN
GFR NON-AFRICAN AMERICAN: >60 ML/MIN
GFR NON-AFRICAN AMERICAN: >60 ML/MIN
GFR SERPL CREATININE-BSD FRML MDRD: >60 ML/MIN
GFR SERPL CREATININE-BSD FRML MDRD: ABNORMAL ML/MIN/{1.73_M2}
GFR SERPL CREATININE-BSD FRML MDRD: ABNORMAL ML/MIN/{1.73_M2}
GFR SERPL CREATININE-BSD FRML MDRD: NORMAL ML/MIN/{1.73_M2}
GLUCOSE BLD-MCNC: 114 MG/DL (ref 74–100)
GLUCOSE BLD-MCNC: 116 MG/DL (ref 74–100)
GLUCOSE BLD-MCNC: 123 MG/DL (ref 74–100)
GLUCOSE BLD-MCNC: 125 MG/DL (ref 74–100)
GLUCOSE BLD-MCNC: 127 MG/DL (ref 74–100)
GLUCOSE BLD-MCNC: 127 MG/DL (ref 75–110)
GLUCOSE BLD-MCNC: 129 MG/DL (ref 74–100)
GLUCOSE BLD-MCNC: 132 MG/DL (ref 74–100)
GLUCOSE BLD-MCNC: 142 MG/DL (ref 70–99)
GLUCOSE BLD-MCNC: 153 MG/DL (ref 75–110)
HCT VFR BLD CALC: 29.8 % (ref 40.7–50.3)
HCT VFR BLD CALC: 35.7 % (ref 40.7–50.3)
HEMOGLOBIN: 10 G/DL (ref 13–17)
HEMOGLOBIN: 12.2 G/DL (ref 13–17)
INR BLD: 1.2
MAGNESIUM: 2.1 MG/DL (ref 1.6–2.6)
MAGNESIUM: 3 MG/DL (ref 1.6–2.6)
MCH RBC QN AUTO: 32.4 PG (ref 25.2–33.5)
MCHC RBC AUTO-ENTMCNC: 34.2 G/DL (ref 28.4–34.8)
MCV RBC AUTO: 94.9 FL (ref 82.6–102.9)
MODE: ABNORMAL
NEGATIVE BASE EXCESS, ART: 2 (ref 0–2)
NEGATIVE BASE EXCESS, ART: 3 (ref 0–2)
NEGATIVE BASE EXCESS, ART: 4 (ref 0–2)
NEGATIVE BASE EXCESS, ART: 7 (ref 0–2)
NEGATIVE BASE EXCESS, ART: ABNORMAL (ref 0–2)
NRBC AUTOMATED: 0 PER 100 WBC
O2 DEVICE/FLOW/%: ABNORMAL
PARTIAL THROMBOPLASTIN TIME: 21.8 SEC (ref 20.5–30.5)
PATIENT TEMP: ABNORMAL
PDW BLD-RTO: 11.4 % (ref 11.8–14.4)
PLATELET # BLD: 151 K/UL (ref 138–453)
PMV BLD AUTO: 10.3 FL (ref 8.1–13.5)
POC BUN: 15 MG/DL (ref 8–26)
POC CHLORIDE: 105 MMOL/L (ref 98–107)
POC CREATININE: 0.9 MG/DL (ref 0.51–1.19)
POC HCO3: 19.6 MMOL/L (ref 21–28)
POC HCO3: 21.5 MMOL/L (ref 21–28)
POC HCO3: 23.1 MMOL/L (ref 21–28)
POC HCO3: 25.4 MMOL/L (ref 21–28)
POC HCO3: 25.5 MMOL/L (ref 21–28)
POC HCO3: 25.9 MMOL/L (ref 21–28)
POC HCO3: 26.3 MMOL/L (ref 21–28)
POC HEMATOCRIT: 24 % (ref 41–53)
POC HEMATOCRIT: 27 % (ref 41–53)
POC HEMATOCRIT: 29 % (ref 41–53)
POC HEMATOCRIT: 34 % (ref 41–53)
POC HEMATOCRIT: 34 % (ref 41–53)
POC HEMATOCRIT: 35 % (ref 41–53)
POC HEMOGLOBIN: 11.5 G/DL (ref 13.5–17.5)
POC HEMOGLOBIN: 11.5 G/DL (ref 13.5–17.5)
POC HEMOGLOBIN: 11.9 G/DL (ref 13.5–17.5)
POC HEMOGLOBIN: 8.3 G/DL (ref 13.5–17.5)
POC HEMOGLOBIN: 9.1 G/DL (ref 13.5–17.5)
POC HEMOGLOBIN: 9.7 G/DL (ref 13.5–17.5)
POC IONIZED CALCIUM: 1.08 MMOL/L (ref 1.15–1.33)
POC IONIZED CALCIUM: 1.09 MMOL/L (ref 1.15–1.33)
POC IONIZED CALCIUM: 1.27 MMOL/L (ref 1.15–1.33)
POC IONIZED CALCIUM: 1.39 MMOL/L (ref 1.15–1.33)
POC IONIZED CALCIUM: 1.4 MMOL/L (ref 1.15–1.33)
POC IONIZED CALCIUM: 1.69 MMOL/L (ref 1.15–1.33)
POC LACTIC ACID: 1.69 MMOL/L (ref 0.56–1.39)
POC LACTIC ACID: 2.59 MMOL/L (ref 0.56–1.39)
POC O2 SATURATION: 100 % (ref 94–98)
POC O2 SATURATION: 99 % (ref 94–98)
POC PCO2 TEMP: ABNORMAL MM HG
POC PCO2: 37.2 MM HG (ref 35–48)
POC PCO2: 39.2 MM HG (ref 35–48)
POC PCO2: 43 MM HG (ref 35–48)
POC PCO2: 46 MM HG (ref 35–48)
POC PCO2: 49.2 MM HG (ref 35–48)
POC PCO2: 51.3 MM HG (ref 35–48)
POC PCO2: 52.7 MM HG (ref 35–48)
POC PH TEMP: ABNORMAL
POC PH: 7.25 (ref 7.35–7.45)
POC PH: 7.27 (ref 7.35–7.45)
POC PH: 7.3 (ref 7.35–7.45)
POC PH: 7.33 (ref 7.35–7.45)
POC PH: 7.37 (ref 7.35–7.45)
POC PH: 7.37 (ref 7.35–7.45)
POC PH: 7.42 (ref 7.35–7.45)
POC PO2 TEMP: ABNORMAL MM HG
POC PO2: 167.3 MM HG (ref 83–108)
POC PO2: 523.1 MM HG (ref 83–108)
POC PO2: 538.7 MM HG (ref 83–108)
POC PO2: 548.8 MM HG (ref 83–108)
POC PO2: 551.4 MM HG (ref 83–108)
POC PO2: 608 MM HG (ref 83–108)
POC PO2: 612.7 MM HG (ref 83–108)
POC POTASSIUM: 3.8 MMOL/L (ref 3.5–4.5)
POC POTASSIUM: 3.8 MMOL/L (ref 3.5–4.5)
POC POTASSIUM: 4.5 MMOL/L (ref 3.5–4.5)
POC POTASSIUM: 4.5 MMOL/L (ref 3.5–4.5)
POC POTASSIUM: 4.6 MMOL/L (ref 3.5–4.5)
POC POTASSIUM: 4.8 MMOL/L (ref 3.5–4.5)
POC SODIUM: 131 MMOL/L (ref 138–146)
POC SODIUM: 134 MMOL/L (ref 138–146)
POC SODIUM: 135 MMOL/L (ref 138–146)
POC SODIUM: 137 MMOL/L (ref 138–146)
POC SODIUM: 138 MMOL/L (ref 138–146)
POC TCO2: 22 MMOL/L (ref 22–30)
POC TCO2: 26 MMOL/L (ref 22–30)
POSITIVE BASE EXCESS, ART: 0 (ref 0–3)
POSITIVE BASE EXCESS, ART: 1 (ref 0–3)
POSITIVE BASE EXCESS, ART: 1 (ref 0–3)
POSITIVE BASE EXCESS, ART: ABNORMAL (ref 0–3)
POTASSIUM SERPL-SCNC: 4.6 MMOL/L (ref 3.7–5.3)
POTASSIUM SERPL-SCNC: 4.7 MMOL/L (ref 3.7–5.3)
PROTHROMBIN TIME: 12.2 SEC (ref 9.1–12.3)
RBC # BLD: 3.76 M/UL (ref 4.21–5.77)
SAMPLE SITE: ABNORMAL
SODIUM BLD-SCNC: 139 MMOL/L (ref 135–144)
TCO2 (CALC), ART: ABNORMAL MMOL/L (ref 22–29)
WBC # BLD: 20.6 K/UL (ref 3.5–11.3)

## 2021-08-30 PROCEDURE — 6360000002 HC RX W HCPCS: Performed by: SPECIALIST

## 2021-08-30 PROCEDURE — 6370000000 HC RX 637 (ALT 250 FOR IP): Performed by: ANESTHESIOLOGY

## 2021-08-30 PROCEDURE — 80048 BASIC METABOLIC PNL TOTAL CA: CPT

## 2021-08-30 PROCEDURE — 05BA4ZZ EXCISION OF LEFT BRACHIAL VEIN, PERCUTANEOUS ENDOSCOPIC APPROACH: ICD-10-PCS | Performed by: THORACIC SURGERY (CARDIOTHORACIC VASCULAR SURGERY)

## 2021-08-30 PROCEDURE — 2580000003 HC RX 258: Performed by: SPECIALIST

## 2021-08-30 PROCEDURE — 3700000000 HC ANESTHESIA ATTENDED CARE: Performed by: THORACIC SURGERY (CARDIOTHORACIC VASCULAR SURGERY)

## 2021-08-30 PROCEDURE — 80051 ELECTROLYTE PANEL: CPT

## 2021-08-30 PROCEDURE — 33533 CABG ARTERIAL SINGLE: CPT | Performed by: THORACIC SURGERY (CARDIOTHORACIC VASCULAR SURGERY)

## 2021-08-30 PROCEDURE — 93005 ELECTROCARDIOGRAM TRACING: CPT | Performed by: NURSE PRACTITIONER

## 2021-08-30 PROCEDURE — 85576 BLOOD PLATELET AGGREGATION: CPT

## 2021-08-30 PROCEDURE — 83605 ASSAY OF LACTIC ACID: CPT

## 2021-08-30 PROCEDURE — 33518 CABG ARTERY-VEIN TWO: CPT | Performed by: PHYSICIAN ASSISTANT

## 2021-08-30 PROCEDURE — 6360000002 HC RX W HCPCS: Performed by: THORACIC SURGERY (CARDIOTHORACIC VASCULAR SURGERY)

## 2021-08-30 PROCEDURE — 02100Z9 BYPASS CORONARY ARTERY, ONE ARTERY FROM LEFT INTERNAL MAMMARY, OPEN APPROACH: ICD-10-PCS | Performed by: THORACIC SURGERY (CARDIOTHORACIC VASCULAR SURGERY)

## 2021-08-30 PROCEDURE — 37799 UNLISTED PX VASCULAR SURGERY: CPT

## 2021-08-30 PROCEDURE — 94761 N-INVAS EAR/PLS OXIMETRY MLT: CPT

## 2021-08-30 PROCEDURE — 85730 THROMBOPLASTIN TIME PARTIAL: CPT

## 2021-08-30 PROCEDURE — 7100000001 HC PACU RECOVERY - ADDTL 15 MIN

## 2021-08-30 PROCEDURE — 71045 X-RAY EXAM CHEST 1 VIEW: CPT

## 2021-08-30 PROCEDURE — 3700000001 HC ADD 15 MINUTES (ANESTHESIA): Performed by: THORACIC SURGERY (CARDIOTHORACIC VASCULAR SURGERY)

## 2021-08-30 PROCEDURE — 06BP4ZZ EXCISION OF RIGHT SAPHENOUS VEIN, PERCUTANEOUS ENDOSCOPIC APPROACH: ICD-10-PCS | Performed by: THORACIC SURGERY (CARDIOTHORACIC VASCULAR SURGERY)

## 2021-08-30 PROCEDURE — P9041 ALBUMIN (HUMAN),5%, 50ML: HCPCS | Performed by: NURSE PRACTITIONER

## 2021-08-30 PROCEDURE — 3600000008 HC SURGERY OHS BASE: Performed by: THORACIC SURGERY (CARDIOTHORACIC VASCULAR SURGERY)

## 2021-08-30 PROCEDURE — 82374 ASSAY BLOOD CARBON DIOXIDE: CPT

## 2021-08-30 PROCEDURE — 82565 ASSAY OF CREATININE: CPT

## 2021-08-30 PROCEDURE — 2500000003 HC RX 250 WO HCPCS: Performed by: NURSE PRACTITIONER

## 2021-08-30 PROCEDURE — 94640 AIRWAY INHALATION TREATMENT: CPT

## 2021-08-30 PROCEDURE — 33533 CABG ARTERIAL SINGLE: CPT | Performed by: PHYSICIAN ASSISTANT

## 2021-08-30 PROCEDURE — 84520 ASSAY OF UREA NITROGEN: CPT

## 2021-08-30 PROCEDURE — 33508 ENDOSCOPIC VEIN HARVEST: CPT | Performed by: THORACIC SURGERY (CARDIOTHORACIC VASCULAR SURGERY)

## 2021-08-30 PROCEDURE — 3600000018 HC SURGERY OHS ADDTL 15MIN: Performed by: THORACIC SURGERY (CARDIOTHORACIC VASCULAR SURGERY)

## 2021-08-30 PROCEDURE — 85384 FIBRINOGEN ACTIVITY: CPT

## 2021-08-30 PROCEDURE — 2720000010 HC SURG SUPPLY STERILE: Performed by: THORACIC SURGERY (CARDIOTHORACIC VASCULAR SURGERY)

## 2021-08-30 PROCEDURE — 6370000000 HC RX 637 (ALT 250 FOR IP): Performed by: NURSE PRACTITIONER

## 2021-08-30 PROCEDURE — 85610 PROTHROMBIN TIME: CPT

## 2021-08-30 PROCEDURE — 85390 FIBRINOLYSINS SCREEN I&R: CPT

## 2021-08-30 PROCEDURE — 82803 BLOOD GASES ANY COMBINATION: CPT

## 2021-08-30 PROCEDURE — 2580000003 HC RX 258: Performed by: NURSE PRACTITIONER

## 2021-08-30 PROCEDURE — 84132 ASSAY OF SERUM POTASSIUM: CPT

## 2021-08-30 PROCEDURE — 85027 COMPLETE CBC AUTOMATED: CPT

## 2021-08-30 PROCEDURE — 82947 ASSAY GLUCOSE BLOOD QUANT: CPT

## 2021-08-30 PROCEDURE — 6360000002 HC RX W HCPCS: Performed by: NURSE PRACTITIONER

## 2021-08-30 PROCEDURE — 83735 ASSAY OF MAGNESIUM: CPT

## 2021-08-30 PROCEDURE — 85347 COAGULATION TIME ACTIVATED: CPT

## 2021-08-30 PROCEDURE — 33518 CABG ARTERY-VEIN TWO: CPT | Performed by: THORACIC SURGERY (CARDIOTHORACIC VASCULAR SURGERY)

## 2021-08-30 PROCEDURE — 94002 VENT MGMT INPAT INIT DAY: CPT

## 2021-08-30 PROCEDURE — C9113 INJ PANTOPRAZOLE SODIUM, VIA: HCPCS | Performed by: NURSE PRACTITIONER

## 2021-08-30 PROCEDURE — 84295 ASSAY OF SERUM SODIUM: CPT

## 2021-08-30 PROCEDURE — 85014 HEMATOCRIT: CPT

## 2021-08-30 PROCEDURE — 2709999900 HC NON-CHARGEABLE SUPPLY: Performed by: THORACIC SURGERY (CARDIOTHORACIC VASCULAR SURGERY)

## 2021-08-30 PROCEDURE — 82330 ASSAY OF CALCIUM: CPT

## 2021-08-30 PROCEDURE — B24BZZ4 ULTRASONOGRAPHY OF HEART WITH AORTA, TRANSESOPHAGEAL: ICD-10-PCS | Performed by: ANESTHESIOLOGY

## 2021-08-30 PROCEDURE — 2580000003 HC RX 258: Performed by: ANESTHESIOLOGY

## 2021-08-30 PROCEDURE — 2700000000 HC OXYGEN THERAPY PER DAY

## 2021-08-30 PROCEDURE — 2100000001 HC CVICU R&B

## 2021-08-30 PROCEDURE — 33508 ENDOSCOPIC VEIN HARVEST: CPT | Performed by: PHYSICIAN ASSISTANT

## 2021-08-30 PROCEDURE — 2500000003 HC RX 250 WO HCPCS: Performed by: ANESTHESIOLOGY

## 2021-08-30 PROCEDURE — 85018 HEMOGLOBIN: CPT

## 2021-08-30 PROCEDURE — 5A1221Z PERFORMANCE OF CARDIAC OUTPUT, CONTINUOUS: ICD-10-PCS | Performed by: THORACIC SURGERY (CARDIOTHORACIC VASCULAR SURGERY)

## 2021-08-30 PROCEDURE — 2580000003 HC RX 258: Performed by: THORACIC SURGERY (CARDIOTHORACIC VASCULAR SURGERY)

## 2021-08-30 PROCEDURE — 7100000000 HC PACU RECOVERY - FIRST 15 MIN

## 2021-08-30 PROCEDURE — 021109W BYPASS CORONARY ARTERY, TWO ARTERIES FROM AORTA WITH AUTOLOGOUS VENOUS TISSUE, OPEN APPROACH: ICD-10-PCS | Performed by: THORACIC SURGERY (CARDIOTHORACIC VASCULAR SURGERY)

## 2021-08-30 PROCEDURE — 2500000003 HC RX 250 WO HCPCS: Performed by: SPECIALIST

## 2021-08-30 RX ORDER — SODIUM CHLORIDE 9 MG/ML
10 INJECTION INTRAVENOUS DAILY
Status: DISCONTINUED | OUTPATIENT
Start: 2021-08-30 | End: 2021-09-02 | Stop reason: HOSPADM

## 2021-08-30 RX ORDER — DEXTROSE MONOHYDRATE 25 G/50ML
12.5 INJECTION, SOLUTION INTRAVENOUS PRN
Status: DISCONTINUED | OUTPATIENT
Start: 2021-08-30 | End: 2021-09-02 | Stop reason: HOSPADM

## 2021-08-30 RX ORDER — MIDAZOLAM HYDROCHLORIDE 1 MG/ML
INJECTION INTRAMUSCULAR; INTRAVENOUS PRN
Status: DISCONTINUED | OUTPATIENT
Start: 2021-08-30 | End: 2021-08-30 | Stop reason: SDUPTHER

## 2021-08-30 RX ORDER — VANCOMYCIN HYDROCHLORIDE 1 G/20ML
INJECTION, POWDER, LYOPHILIZED, FOR SOLUTION INTRAVENOUS
Status: DISCONTINUED
Start: 2021-08-30 | End: 2021-08-30

## 2021-08-30 RX ORDER — CALCIUM CHLORIDE 100 MG/ML
INJECTION INTRAVENOUS; INTRAVENTRICULAR PRN
Status: DISCONTINUED | OUTPATIENT
Start: 2021-08-30 | End: 2021-08-30 | Stop reason: SDUPTHER

## 2021-08-30 RX ORDER — PAPAVERINE HYDROCHLORIDE 30 MG/ML
INJECTION INTRAMUSCULAR; INTRAVENOUS
Status: DISCONTINUED
Start: 2021-08-30 | End: 2021-08-30

## 2021-08-30 RX ORDER — PANTOPRAZOLE SODIUM 40 MG/10ML
40 INJECTION, POWDER, LYOPHILIZED, FOR SOLUTION INTRAVENOUS DAILY
Status: DISCONTINUED | OUTPATIENT
Start: 2021-08-30 | End: 2021-09-02 | Stop reason: HOSPADM

## 2021-08-30 RX ORDER — ONDANSETRON 2 MG/ML
4 INJECTION INTRAMUSCULAR; INTRAVENOUS EVERY 8 HOURS PRN
Status: DISCONTINUED | OUTPATIENT
Start: 2021-08-30 | End: 2021-09-02 | Stop reason: HOSPADM

## 2021-08-30 RX ORDER — ROCURONIUM BROMIDE 10 MG/ML
INJECTION, SOLUTION INTRAVENOUS PRN
Status: DISCONTINUED | OUTPATIENT
Start: 2021-08-30 | End: 2021-08-30 | Stop reason: SDUPTHER

## 2021-08-30 RX ORDER — LORAZEPAM 1 MG/1
2 TABLET ORAL
Status: COMPLETED | OUTPATIENT
Start: 2021-08-30 | End: 2021-08-30

## 2021-08-30 RX ORDER — METOPROLOL TARTRATE 5 MG/5ML
2.5 INJECTION INTRAVENOUS EVERY 10 MIN PRN
Status: DISCONTINUED | OUTPATIENT
Start: 2021-08-30 | End: 2021-09-02 | Stop reason: HOSPADM

## 2021-08-30 RX ORDER — FENTANYL CITRATE 50 UG/ML
25 INJECTION, SOLUTION INTRAMUSCULAR; INTRAVENOUS
Status: DISCONTINUED | OUTPATIENT
Start: 2021-08-30 | End: 2021-09-02 | Stop reason: HOSPADM

## 2021-08-30 RX ORDER — SODIUM CHLORIDE 0.9 % (FLUSH) 0.9 %
5-40 SYRINGE (ML) INJECTION EVERY 12 HOURS SCHEDULED
Status: DISCONTINUED | OUTPATIENT
Start: 2021-08-30 | End: 2021-08-30 | Stop reason: HOSPADM

## 2021-08-30 RX ORDER — GLYCOPYRROLATE 1 MG/5 ML
SYRINGE (ML) INTRAVENOUS PRN
Status: DISCONTINUED | OUTPATIENT
Start: 2021-08-30 | End: 2021-08-30 | Stop reason: SDUPTHER

## 2021-08-30 RX ORDER — SODIUM PHOSPHATE, DIBASIC AND SODIUM PHOSPHATE, MONOBASIC 7; 19 G/133ML; G/133ML
1 ENEMA RECTAL DAILY PRN
Status: DISCONTINUED | OUTPATIENT
Start: 2021-08-30 | End: 2021-09-02 | Stop reason: HOSPADM

## 2021-08-30 RX ORDER — OXYCODONE HYDROCHLORIDE AND ACETAMINOPHEN 5; 325 MG/1; MG/1
2 TABLET ORAL EVERY 4 HOURS PRN
Status: DISCONTINUED | OUTPATIENT
Start: 2021-08-30 | End: 2021-09-02 | Stop reason: HOSPADM

## 2021-08-30 RX ORDER — PROPOFOL 10 MG/ML
10 INJECTION, EMULSION INTRAVENOUS CONTINUOUS
Status: DISCONTINUED | OUTPATIENT
Start: 2021-08-30 | End: 2021-08-30 | Stop reason: SDUPTHER

## 2021-08-30 RX ORDER — ASPIRIN 81 MG/1
81 TABLET ORAL
Status: DISCONTINUED | OUTPATIENT
Start: 2021-08-30 | End: 2021-08-30 | Stop reason: HOSPADM

## 2021-08-30 RX ORDER — PANTOPRAZOLE SODIUM 40 MG/1
40 TABLET, DELAYED RELEASE ORAL DAILY
Status: DISCONTINUED | OUTPATIENT
Start: 2021-08-30 | End: 2021-09-02 | Stop reason: HOSPADM

## 2021-08-30 RX ORDER — ACETAMINOPHEN 325 MG/1
650 TABLET ORAL EVERY 4 HOURS PRN
Status: DISCONTINUED | OUTPATIENT
Start: 2021-08-30 | End: 2021-09-02 | Stop reason: HOSPADM

## 2021-08-30 RX ORDER — SODIUM CHLORIDE, SODIUM LACTATE, POTASSIUM CHLORIDE, CALCIUM CHLORIDE 600; 310; 30; 20 MG/100ML; MG/100ML; MG/100ML; MG/100ML
1000 INJECTION, SOLUTION INTRAVENOUS CONTINUOUS
Status: DISCONTINUED | OUTPATIENT
Start: 2021-08-30 | End: 2021-08-30

## 2021-08-30 RX ORDER — DIPHENHYDRAMINE HCL 25 MG
25 TABLET ORAL NIGHTLY PRN
Status: DISCONTINUED | OUTPATIENT
Start: 2021-08-31 | End: 2021-09-02 | Stop reason: HOSPADM

## 2021-08-30 RX ORDER — VANCOMYCIN HYDROCHLORIDE 1 G/200ML
1000 INJECTION, SOLUTION INTRAVENOUS
Status: COMPLETED | OUTPATIENT
Start: 2021-08-30 | End: 2021-08-30

## 2021-08-30 RX ORDER — LIDOCAINE HYDROCHLORIDE 10 MG/ML
INJECTION, SOLUTION EPIDURAL; INFILTRATION; INTRACAUDAL; PERINEURAL PRN
Status: DISCONTINUED | OUTPATIENT
Start: 2021-08-30 | End: 2021-08-30 | Stop reason: SDUPTHER

## 2021-08-30 RX ORDER — PHENYLEPHRINE HCL IN 0.9% NACL 1 MG/10 ML
SYRINGE (ML) INTRAVENOUS PRN
Status: DISCONTINUED | OUTPATIENT
Start: 2021-08-30 | End: 2021-08-30 | Stop reason: SDUPTHER

## 2021-08-30 RX ORDER — FENTANYL CITRATE 50 UG/ML
50 INJECTION, SOLUTION INTRAMUSCULAR; INTRAVENOUS
Status: DISCONTINUED | OUTPATIENT
Start: 2021-08-30 | End: 2021-09-02 | Stop reason: HOSPADM

## 2021-08-30 RX ORDER — AMIODARONE HYDROCHLORIDE 200 MG/1
200 TABLET ORAL 3 TIMES DAILY
Status: DISCONTINUED | OUTPATIENT
Start: 2021-08-30 | End: 2021-08-30 | Stop reason: HOSPADM

## 2021-08-30 RX ORDER — POTASSIUM CHLORIDE 7.45 MG/ML
10 INJECTION INTRAVENOUS PRN
Status: DISCONTINUED | OUTPATIENT
Start: 2021-08-30 | End: 2021-09-02 | Stop reason: HOSPADM

## 2021-08-30 RX ORDER — SODIUM CHLORIDE, SODIUM LACTATE, POTASSIUM CHLORIDE, CALCIUM CHLORIDE 600; 310; 30; 20 MG/100ML; MG/100ML; MG/100ML; MG/100ML
INJECTION, SOLUTION INTRAVENOUS CONTINUOUS PRN
Status: DISCONTINUED | OUTPATIENT
Start: 2021-08-30 | End: 2021-08-30 | Stop reason: SDUPTHER

## 2021-08-30 RX ORDER — OXYCODONE HYDROCHLORIDE AND ACETAMINOPHEN 5; 325 MG/1; MG/1
1 TABLET ORAL EVERY 4 HOURS PRN
Status: DISCONTINUED | OUTPATIENT
Start: 2021-08-30 | End: 2021-09-02 | Stop reason: HOSPADM

## 2021-08-30 RX ORDER — SODIUM CHLORIDE 0.9 % (FLUSH) 0.9 %
10 SYRINGE (ML) INJECTION PRN
Status: DISCONTINUED | OUTPATIENT
Start: 2021-08-30 | End: 2021-09-02 | Stop reason: HOSPADM

## 2021-08-30 RX ORDER — POTASSIUM CHLORIDE 29.8 MG/ML
20 INJECTION INTRAVENOUS PRN
Status: DISCONTINUED | OUTPATIENT
Start: 2021-08-30 | End: 2021-09-02 | Stop reason: HOSPADM

## 2021-08-30 RX ORDER — AMIODARONE HYDROCHLORIDE 200 MG/1
200 TABLET ORAL 3 TIMES DAILY
Status: DISCONTINUED | OUTPATIENT
Start: 2021-08-30 | End: 2021-09-02 | Stop reason: HOSPADM

## 2021-08-30 RX ORDER — ONDANSETRON 2 MG/ML
INJECTION INTRAMUSCULAR; INTRAVENOUS PRN
Status: DISCONTINUED | OUTPATIENT
Start: 2021-08-30 | End: 2021-08-30 | Stop reason: SDUPTHER

## 2021-08-30 RX ORDER — PROTAMINE SULFATE 10 MG/ML
INJECTION, SOLUTION INTRAVENOUS PRN
Status: DISCONTINUED | OUTPATIENT
Start: 2021-08-30 | End: 2021-08-30 | Stop reason: SDUPTHER

## 2021-08-30 RX ORDER — EPHEDRINE SULFATE/0.9% NACL/PF 50 MG/5 ML
SYRINGE (ML) INTRAVENOUS PRN
Status: DISCONTINUED | OUTPATIENT
Start: 2021-08-30 | End: 2021-08-30 | Stop reason: SDUPTHER

## 2021-08-30 RX ORDER — SODIUM CHLORIDE 0.9 % (FLUSH) 0.9 %
10 SYRINGE (ML) INJECTION PRN
Status: DISCONTINUED | OUTPATIENT
Start: 2021-08-30 | End: 2021-08-30 | Stop reason: HOSPADM

## 2021-08-30 RX ORDER — HYDRALAZINE HYDROCHLORIDE 20 MG/ML
5 INJECTION INTRAMUSCULAR; INTRAVENOUS EVERY 5 MIN PRN
Status: DISCONTINUED | OUTPATIENT
Start: 2021-08-30 | End: 2021-09-02 | Stop reason: HOSPADM

## 2021-08-30 RX ORDER — CHLORHEXIDINE GLUCONATE 0.12 MG/ML
15 RINSE ORAL ONCE
Status: COMPLETED | OUTPATIENT
Start: 2021-08-30 | End: 2021-08-30

## 2021-08-30 RX ORDER — VANCOMYCIN HYDROCHLORIDE 1 G/200ML
1000 INJECTION, SOLUTION INTRAVENOUS EVERY 12 HOURS
Status: COMPLETED | OUTPATIENT
Start: 2021-08-30 | End: 2021-08-31

## 2021-08-30 RX ORDER — SODIUM CHLORIDE 0.9 % (FLUSH) 0.9 %
10 SYRINGE (ML) INJECTION EVERY 12 HOURS SCHEDULED
Status: DISCONTINUED | OUTPATIENT
Start: 2021-08-30 | End: 2021-09-02 | Stop reason: HOSPADM

## 2021-08-30 RX ORDER — ATORVASTATIN CALCIUM 80 MG/1
80 TABLET, FILM COATED ORAL NIGHTLY
Status: DISCONTINUED | OUTPATIENT
Start: 2021-08-31 | End: 2021-09-02 | Stop reason: HOSPADM

## 2021-08-30 RX ORDER — MAGNESIUM HYDROXIDE 1200 MG/15ML
LIQUID ORAL CONTINUOUS PRN
Status: COMPLETED | OUTPATIENT
Start: 2021-08-30 | End: 2021-08-30

## 2021-08-30 RX ORDER — POLYETHYLENE GLYCOL 3350 17 G/17G
17 POWDER, FOR SOLUTION ORAL DAILY
Status: DISCONTINUED | OUTPATIENT
Start: 2021-08-30 | End: 2021-09-02 | Stop reason: HOSPADM

## 2021-08-30 RX ORDER — FENTANYL CITRATE 0.05 MG/ML
INJECTION, SOLUTION INTRAMUSCULAR; INTRAVENOUS PRN
Status: DISCONTINUED | OUTPATIENT
Start: 2021-08-30 | End: 2021-08-30 | Stop reason: SDUPTHER

## 2021-08-30 RX ORDER — DEXTROSE MONOHYDRATE 50 MG/ML
100 INJECTION, SOLUTION INTRAVENOUS PRN
Status: DISCONTINUED | OUTPATIENT
Start: 2021-08-30 | End: 2021-09-02 | Stop reason: HOSPADM

## 2021-08-30 RX ORDER — SODIUM CHLORIDE, SODIUM LACTATE, POTASSIUM CHLORIDE, CALCIUM CHLORIDE 600; 310; 30; 20 MG/100ML; MG/100ML; MG/100ML; MG/100ML
INJECTION, SOLUTION INTRAVENOUS CONTINUOUS
Status: DISCONTINUED | OUTPATIENT
Start: 2021-08-30 | End: 2021-08-30

## 2021-08-30 RX ORDER — SODIUM CHLORIDE 9 MG/ML
INJECTION INTRAVENOUS
Status: DISCONTINUED
Start: 2021-08-30 | End: 2021-08-30

## 2021-08-30 RX ORDER — SODIUM CHLORIDE 9 MG/ML
25 INJECTION, SOLUTION INTRAVENOUS PRN
Status: DISCONTINUED | OUTPATIENT
Start: 2021-08-30 | End: 2021-08-30 | Stop reason: HOSPADM

## 2021-08-30 RX ORDER — ALBUMIN, HUMAN INJ 5% 5 %
25 SOLUTION INTRAVENOUS PRN
Status: DISCONTINUED | OUTPATIENT
Start: 2021-08-30 | End: 2021-09-02 | Stop reason: HOSPADM

## 2021-08-30 RX ORDER — POTASSIUM CHLORIDE 29.8 MG/ML
20 INJECTION INTRAVENOUS PRN
Status: DISCONTINUED | OUTPATIENT
Start: 2021-08-30 | End: 2021-08-30

## 2021-08-30 RX ORDER — CLOPIDOGREL BISULFATE 75 MG/1
75 TABLET ORAL DAILY
Status: DISCONTINUED | OUTPATIENT
Start: 2021-08-31 | End: 2021-09-02 | Stop reason: HOSPADM

## 2021-08-30 RX ORDER — NICOTINE POLACRILEX 4 MG
15 LOZENGE BUCCAL PRN
Status: DISCONTINUED | OUTPATIENT
Start: 2021-08-30 | End: 2021-09-02 | Stop reason: HOSPADM

## 2021-08-30 RX ORDER — SODIUM CHLORIDE 9 MG/ML
INJECTION, SOLUTION INTRAVENOUS CONTINUOUS PRN
Status: DISCONTINUED | OUTPATIENT
Start: 2021-08-30 | End: 2021-08-30 | Stop reason: SDUPTHER

## 2021-08-30 RX ORDER — PROPOFOL 10 MG/ML
INJECTION, EMULSION INTRAVENOUS PRN
Status: DISCONTINUED | OUTPATIENT
Start: 2021-08-30 | End: 2021-08-30 | Stop reason: SDUPTHER

## 2021-08-30 RX ORDER — POTASSIUM CHLORIDE 20 MEQ/1
40 TABLET, EXTENDED RELEASE ORAL PRN
Status: DISCONTINUED | OUTPATIENT
Start: 2021-08-30 | End: 2021-09-02 | Stop reason: HOSPADM

## 2021-08-30 RX ORDER — PROTAMINE SULFATE 10 MG/ML
50 INJECTION, SOLUTION INTRAVENOUS
Status: ACTIVE | OUTPATIENT
Start: 2021-08-30 | End: 2021-08-30

## 2021-08-30 RX ORDER — MAGNESIUM SULFATE 1 G/100ML
1000 INJECTION INTRAVENOUS PRN
Status: DISCONTINUED | OUTPATIENT
Start: 2021-08-30 | End: 2021-09-02 | Stop reason: HOSPADM

## 2021-08-30 RX ORDER — ASPIRIN 81 MG/1
81 TABLET ORAL DAILY
Status: DISCONTINUED | OUTPATIENT
Start: 2021-08-30 | End: 2021-09-02 | Stop reason: HOSPADM

## 2021-08-30 RX ORDER — IPRATROPIUM BROMIDE AND ALBUTEROL SULFATE 2.5; .5 MG/3ML; MG/3ML
1 SOLUTION RESPIRATORY (INHALATION)
Status: DISCONTINUED | OUTPATIENT
Start: 2021-08-30 | End: 2021-09-02

## 2021-08-30 RX ORDER — HEPARIN SODIUM 1000 [USP'U]/ML
INJECTION, SOLUTION INTRAVENOUS; SUBCUTANEOUS PRN
Status: DISCONTINUED | OUTPATIENT
Start: 2021-08-30 | End: 2021-08-30 | Stop reason: SDUPTHER

## 2021-08-30 RX ORDER — SODIUM CHLORIDE 9 MG/ML
25 INJECTION, SOLUTION INTRAVENOUS PRN
Status: DISCONTINUED | OUTPATIENT
Start: 2021-08-30 | End: 2021-09-02 | Stop reason: HOSPADM

## 2021-08-30 RX ORDER — NOREPINEPHRINE BIT/0.9 % NACL 16MG/250ML
0.04 INFUSION BOTTLE (ML) INTRAVENOUS CONTINUOUS PRN
Status: DISCONTINUED | OUTPATIENT
Start: 2021-08-30 | End: 2021-09-02 | Stop reason: HOSPADM

## 2021-08-30 RX ORDER — INSULIN GLARGINE 100 [IU]/ML
0.15 INJECTION, SOLUTION SUBCUTANEOUS NIGHTLY
Status: DISCONTINUED | OUTPATIENT
Start: 2021-08-31 | End: 2021-09-02 | Stop reason: HOSPADM

## 2021-08-30 RX ORDER — PROPOFOL 10 MG/ML
5-50 INJECTION, EMULSION INTRAVENOUS
Status: DISCONTINUED | OUTPATIENT
Start: 2021-08-30 | End: 2021-09-01

## 2021-08-30 RX ADMIN — Medication 100 MCG: at 13:09

## 2021-08-30 RX ADMIN — Medication 5 MG: at 09:23

## 2021-08-30 RX ADMIN — Medication 200 MCG: at 11:08

## 2021-08-30 RX ADMIN — SODIUM BICARBONATE 50 MEQ: 84 INJECTION, SOLUTION INTRAVENOUS at 18:02

## 2021-08-30 RX ADMIN — ROCURONIUM BROMIDE 50 MG: 10 INJECTION INTRAVENOUS at 08:30

## 2021-08-30 RX ADMIN — PROPOFOL INJECTABLE EMULSION 30 MCG/KG/MIN: 10 INJECTION, EMULSION INTRAVENOUS at 13:10

## 2021-08-30 RX ADMIN — AMIODARONE HYDROCHLORIDE 200 MG: 200 TABLET ORAL at 21:32

## 2021-08-30 RX ADMIN — ALBUMIN (HUMAN) 25 G: 12.5 INJECTION, SOLUTION INTRAVENOUS at 13:57

## 2021-08-30 RX ADMIN — ROCURONIUM BROMIDE 20 MG: 10 INJECTION INTRAVENOUS at 11:00

## 2021-08-30 RX ADMIN — MUPIROCIN: 20 OINTMENT TOPICAL at 21:32

## 2021-08-30 RX ADMIN — Medication 5 MG: at 09:42

## 2021-08-30 RX ADMIN — DILTIAZEM HYDROCHLORIDE 5 MG/HR: 5 INJECTION INTRAVENOUS at 08:55

## 2021-08-30 RX ADMIN — Medication 100 MCG: at 09:53

## 2021-08-30 RX ADMIN — MUPIROCIN: 20 OINTMENT TOPICAL at 06:57

## 2021-08-30 RX ADMIN — HEPARIN SODIUM 10000 UNITS: 1000 INJECTION, SOLUTION INTRAVENOUS; SUBCUTANEOUS at 11:18

## 2021-08-30 RX ADMIN — Medication 5 MG: at 10:54

## 2021-08-30 RX ADMIN — FENTANYL CITRATE 100 MCG: 50 INJECTION INTRAVENOUS at 13:29

## 2021-08-30 RX ADMIN — ONDANSETRON 4 MG: 2 INJECTION, SOLUTION INTRAMUSCULAR; INTRAVENOUS at 13:19

## 2021-08-30 RX ADMIN — MIDAZOLAM HYDROCHLORIDE 2 MG: 1 INJECTION, SOLUTION INTRAMUSCULAR; INTRAVENOUS at 12:18

## 2021-08-30 RX ADMIN — Medication 50 MCG: at 10:51

## 2021-08-30 RX ADMIN — Medication 5 MG: at 09:10

## 2021-08-30 RX ADMIN — ALBUMIN (HUMAN) 25 G: 12.5 INJECTION, SOLUTION INTRAVENOUS at 17:00

## 2021-08-30 RX ADMIN — MIDAZOLAM HYDROCHLORIDE 2 MG: 1 INJECTION, SOLUTION INTRAMUSCULAR; INTRAVENOUS at 08:30

## 2021-08-30 RX ADMIN — VANCOMYCIN HYDROCHLORIDE 1000 MG: 1 INJECTION, SOLUTION INTRAVENOUS at 21:29

## 2021-08-30 RX ADMIN — SODIUM CHLORIDE, POTASSIUM CHLORIDE, SODIUM LACTATE AND CALCIUM CHLORIDE: 600; 310; 30; 20 INJECTION, SOLUTION INTRAVENOUS at 10:03

## 2021-08-30 RX ADMIN — Medication 100 MCG: at 10:42

## 2021-08-30 RX ADMIN — PROPOFOL INJECTABLE EMULSION 150 MG: 10 INJECTION, EMULSION INTRAVENOUS at 08:30

## 2021-08-30 RX ADMIN — Medication 0.2 MG: at 09:58

## 2021-08-30 RX ADMIN — SODIUM CHLORIDE, POTASSIUM CHLORIDE, SODIUM LACTATE AND CALCIUM CHLORIDE: 600; 310; 30; 20 INJECTION, SOLUTION INTRAVENOUS at 06:03

## 2021-08-30 RX ADMIN — Medication 100 MCG: at 10:31

## 2021-08-30 RX ADMIN — Medication 100 MCG: at 10:22

## 2021-08-30 RX ADMIN — AMIODARONE HYDROCHLORIDE 200 MG: 200 TABLET ORAL at 06:54

## 2021-08-30 RX ADMIN — PANTOPRAZOLE SODIUM 40 MG: 40 INJECTION, POWDER, FOR SOLUTION INTRAVENOUS at 15:06

## 2021-08-30 RX ADMIN — OXYCODONE HYDROCHLORIDE AND ACETAMINOPHEN 2 TABLET: 5; 325 TABLET ORAL at 15:07

## 2021-08-30 RX ADMIN — FENTANYL CITRATE 250 MCG: 50 INJECTION INTRAVENOUS at 13:24

## 2021-08-30 RX ADMIN — PROTAMINE SULFATE 50 MG: 10 INJECTION, SOLUTION INTRAVENOUS at 13:19

## 2021-08-30 RX ADMIN — Medication 10 MG: at 10:57

## 2021-08-30 RX ADMIN — Medication 5 MG: at 09:58

## 2021-08-30 RX ADMIN — SODIUM CHLORIDE: 900 INJECTION, SOLUTION INTRAVENOUS at 10:04

## 2021-08-30 RX ADMIN — Medication 200 MCG: at 09:03

## 2021-08-30 RX ADMIN — SODIUM CHLORIDE, POTASSIUM CHLORIDE, SODIUM LACTATE AND CALCIUM CHLORIDE: 600; 310; 30; 20 INJECTION, SOLUTION INTRAVENOUS at 13:27

## 2021-08-30 RX ADMIN — Medication 50 MCG: at 12:59

## 2021-08-30 RX ADMIN — LORAZEPAM 2 MG: 1 TABLET ORAL at 07:39

## 2021-08-30 RX ADMIN — ROCURONIUM BROMIDE 30 MG: 10 INJECTION INTRAVENOUS at 09:27

## 2021-08-30 RX ADMIN — Medication 200 MCG: at 08:52

## 2021-08-30 RX ADMIN — SODIUM BICARBONATE 50 MEQ: 84 INJECTION, SOLUTION INTRAVENOUS at 17:15

## 2021-08-30 RX ADMIN — Medication 10 MG: at 13:13

## 2021-08-30 RX ADMIN — METOPROLOL TARTRATE 12.5 MG: 25 TABLET, FILM COATED ORAL at 06:54

## 2021-08-30 RX ADMIN — Medication 5 MG: at 09:05

## 2021-08-30 RX ADMIN — Medication 5 MG: at 10:52

## 2021-08-30 RX ADMIN — CHLORHEXIDINE GLUCONATE 0.12% ORAL RINSE 15 ML: 1.2 LIQUID ORAL at 06:57

## 2021-08-30 RX ADMIN — FENTANYL CITRATE 150 MCG: 50 INJECTION INTRAVENOUS at 10:36

## 2021-08-30 RX ADMIN — FENTANYL CITRATE 50 MCG: 50 INJECTION, SOLUTION INTRAMUSCULAR; INTRAVENOUS at 16:12

## 2021-08-30 RX ADMIN — CALCIUM CHLORIDE 0.5 G: 100 INJECTION, SOLUTION INTRAVENOUS; INTRAVENTRICULAR at 08:56

## 2021-08-30 RX ADMIN — SODIUM CHLORIDE, POTASSIUM CHLORIDE, SODIUM LACTATE AND CALCIUM CHLORIDE: 600; 310; 30; 20 INJECTION, SOLUTION INTRAVENOUS at 11:55

## 2021-08-30 RX ADMIN — VANCOMYCIN HYDROCHLORIDE 1000 MG: 1 INJECTION, SOLUTION INTRAVENOUS at 08:47

## 2021-08-30 RX ADMIN — Medication 50 MCG: at 10:58

## 2021-08-30 RX ADMIN — Medication 5 MG: at 09:18

## 2021-08-30 RX ADMIN — Medication 0.2 MG: at 10:00

## 2021-08-30 RX ADMIN — Medication 5 MG: at 13:11

## 2021-08-30 RX ADMIN — Medication 100 MCG: at 09:49

## 2021-08-30 RX ADMIN — LIDOCAINE HYDROCHLORIDE 50 MG: 10 INJECTION, SOLUTION EPIDURAL; INFILTRATION; INTRACAUDAL; PERINEURAL at 08:30

## 2021-08-30 RX ADMIN — MUPIROCIN: 20 OINTMENT TOPICAL at 15:08

## 2021-08-30 RX ADMIN — FENTANYL CITRATE 100 MCG: 50 INJECTION INTRAVENOUS at 10:02

## 2021-08-30 RX ADMIN — Medication 100 MCG: at 13:04

## 2021-08-30 RX ADMIN — ASPIRIN 81 MG: 81 TABLET, COATED ORAL at 15:06

## 2021-08-30 RX ADMIN — Medication 200 MCG: at 08:38

## 2021-08-30 RX ADMIN — OXYCODONE HYDROCHLORIDE AND ACETAMINOPHEN 1 TABLET: 5; 325 TABLET ORAL at 21:29

## 2021-08-30 RX ADMIN — Medication 100 MCG: at 11:15

## 2021-08-30 RX ADMIN — Medication 50 MCG: at 11:03

## 2021-08-30 RX ADMIN — Medication 100 MCG: at 08:39

## 2021-08-30 RX ADMIN — SODIUM CHLORIDE 3.27 UNITS/HR: 9 INJECTION, SOLUTION INTRAVENOUS at 23:48

## 2021-08-30 RX ADMIN — Medication 5 MG: at 10:11

## 2021-08-30 RX ADMIN — ROCURONIUM BROMIDE 30 MG: 10 INJECTION INTRAVENOUS at 13:02

## 2021-08-30 RX ADMIN — SODIUM CHLORIDE, PRESERVATIVE FREE 10 ML: 5 INJECTION INTRAVENOUS at 15:06

## 2021-08-30 RX ADMIN — ROCURONIUM BROMIDE 20 MG: 10 INJECTION INTRAVENOUS at 10:11

## 2021-08-30 RX ADMIN — PROTAMINE SULFATE 250 MG: 10 INJECTION, SOLUTION INTRAVENOUS at 12:41

## 2021-08-30 RX ADMIN — Medication 5 MG: at 09:32

## 2021-08-30 RX ADMIN — FENTANYL CITRATE 250 MCG: 50 INJECTION INTRAVENOUS at 08:30

## 2021-08-30 RX ADMIN — Medication 81 MG: at 06:54

## 2021-08-30 RX ADMIN — SODIUM CHLORIDE, PRESERVATIVE FREE 10 ML: 5 INJECTION INTRAVENOUS at 21:32

## 2021-08-30 RX ADMIN — Medication 100 MCG: at 09:01

## 2021-08-30 RX ADMIN — SODIUM CHLORIDE, POTASSIUM CHLORIDE, SODIUM LACTATE AND CALCIUM CHLORIDE: 600; 310; 30; 20 INJECTION, SOLUTION INTRAVENOUS at 08:26

## 2021-08-30 RX ADMIN — AMINOCAPROIC ACID 10 G/HR: 250 INJECTION, SOLUTION INTRAVENOUS at 09:05

## 2021-08-30 RX ADMIN — Medication 10 MG: at 10:19

## 2021-08-30 RX ADMIN — Medication 100 MCG: at 11:18

## 2021-08-30 RX ADMIN — ALBUMIN (HUMAN) 25 G: 12.5 INJECTION, SOLUTION INTRAVENOUS at 15:04

## 2021-08-30 RX ADMIN — HEPARIN SODIUM 26000 UNITS: 1000 INJECTION, SOLUTION INTRAVENOUS; SUBCUTANEOUS at 11:08

## 2021-08-30 RX ADMIN — IPRATROPIUM BROMIDE AND ALBUTEROL SULFATE 1 AMPULE: .5; 3 SOLUTION RESPIRATORY (INHALATION) at 20:45

## 2021-08-30 ASSESSMENT — PULMONARY FUNCTION TESTS
PIF_VALUE: 17
PIF_VALUE: 16
PIF_VALUE: 15
PIF_VALUE: 12
PIF_VALUE: 14
PIF_VALUE: 14
PIF_VALUE: 12
PIF_VALUE: 14
PIF_VALUE: 16
PIF_VALUE: 14
PIF_VALUE: 2
PIF_VALUE: 15
PIF_VALUE: 2
PIF_VALUE: 11
PIF_VALUE: 15
PIF_VALUE: 1
PIF_VALUE: 14
PIF_VALUE: 11
PIF_VALUE: 15
PIF_VALUE: 1
PIF_VALUE: 14
PIF_VALUE: 19
PIF_VALUE: 14
PIF_VALUE: 14
PIF_VALUE: 12
PIF_VALUE: 1
PIF_VALUE: 12
PIF_VALUE: 5
PIF_VALUE: 14
PIF_VALUE: 14
PIF_VALUE: 11
PIF_VALUE: 16
PIF_VALUE: 12
PIF_VALUE: 14
PIF_VALUE: 13
PIF_VALUE: 14
PIF_VALUE: 12
PIF_VALUE: 14
PIF_VALUE: 14
PIF_VALUE: 11
PIF_VALUE: 11
PIF_VALUE: 1
PIF_VALUE: 16
PIF_VALUE: 16
PIF_VALUE: 15
PIF_VALUE: 1
PIF_VALUE: 1
PIF_VALUE: 12
PIF_VALUE: 14
PIF_VALUE: 11
PIF_VALUE: 1
PIF_VALUE: 1
PIF_VALUE: 16
PIF_VALUE: 14
PIF_VALUE: 14
PIF_VALUE: 2
PIF_VALUE: 15
PIF_VALUE: 15
PIF_VALUE: 2
PIF_VALUE: 12
PIF_VALUE: 1
PIF_VALUE: 3
PIF_VALUE: 16
PIF_VALUE: 11
PIF_VALUE: 11
PIF_VALUE: 13
PIF_VALUE: 22
PIF_VALUE: 14
PIF_VALUE: 14
PIF_VALUE: 15
PIF_VALUE: 15
PIF_VALUE: 11
PIF_VALUE: 15
PIF_VALUE: 1
PIF_VALUE: 1
PIF_VALUE: 15
PIF_VALUE: 11
PIF_VALUE: 14
PIF_VALUE: 10
PIF_VALUE: 1
PIF_VALUE: 1
PIF_VALUE: 15
PIF_VALUE: 1
PIF_VALUE: 14
PIF_VALUE: 2
PIF_VALUE: 14
PIF_VALUE: 11
PIF_VALUE: 12
PIF_VALUE: 10
PIF_VALUE: 1
PIF_VALUE: 1
PIF_VALUE: 15
PIF_VALUE: 1
PIF_VALUE: 14
PIF_VALUE: 12
PIF_VALUE: 15
PIF_VALUE: 15
PIF_VALUE: 12
PIF_VALUE: 15
PIF_VALUE: 15
PIF_VALUE: 2
PIF_VALUE: 16
PIF_VALUE: 1
PIF_VALUE: 15
PIF_VALUE: 12
PIF_VALUE: 15
PIF_VALUE: 18
PIF_VALUE: 11
PIF_VALUE: 15
PIF_VALUE: 14
PIF_VALUE: 14
PIF_VALUE: 1
PIF_VALUE: 14
PIF_VALUE: 15
PIF_VALUE: 14
PIF_VALUE: 2
PIF_VALUE: 1
PIF_VALUE: 14
PIF_VALUE: 15
PIF_VALUE: 11
PIF_VALUE: 14
PIF_VALUE: 17
PIF_VALUE: 14
PIF_VALUE: 14
PIF_VALUE: 15
PIF_VALUE: 10
PIF_VALUE: 12
PIF_VALUE: 11
PIF_VALUE: 1
PIF_VALUE: 2
PIF_VALUE: 3
PIF_VALUE: 11
PIF_VALUE: 14
PIF_VALUE: 15
PIF_VALUE: 12
PIF_VALUE: 11
PIF_VALUE: 1
PIF_VALUE: 14
PIF_VALUE: 14
PIF_VALUE: 11
PIF_VALUE: 15
PIF_VALUE: 14
PIF_VALUE: 12
PIF_VALUE: 15
PIF_VALUE: 18
PIF_VALUE: 12
PIF_VALUE: 15
PIF_VALUE: 15
PIF_VALUE: 1
PIF_VALUE: 11
PIF_VALUE: 11
PIF_VALUE: 17
PIF_VALUE: 11
PIF_VALUE: 17
PIF_VALUE: 11
PIF_VALUE: 15
PIF_VALUE: 1
PIF_VALUE: 11
PIF_VALUE: 15
PIF_VALUE: 13
PIF_VALUE: 13
PIF_VALUE: 16
PIF_VALUE: 3
PIF_VALUE: 15
PIF_VALUE: 16
PIF_VALUE: 16
PIF_VALUE: 2
PIF_VALUE: 11
PIF_VALUE: 2
PIF_VALUE: 2
PIF_VALUE: 14
PIF_VALUE: 14
PIF_VALUE: 1
PIF_VALUE: 15
PIF_VALUE: 11
PIF_VALUE: 16
PIF_VALUE: 14
PIF_VALUE: 1
PIF_VALUE: 13
PIF_VALUE: 14
PIF_VALUE: 10
PIF_VALUE: 11
PIF_VALUE: 1
PIF_VALUE: 17
PIF_VALUE: 18
PIF_VALUE: 11
PIF_VALUE: 11
PIF_VALUE: 15
PIF_VALUE: 15
PIF_VALUE: 17
PIF_VALUE: 10
PIF_VALUE: 10
PIF_VALUE: 1
PIF_VALUE: 12
PIF_VALUE: 14
PIF_VALUE: 11
PIF_VALUE: 14
PIF_VALUE: 14
PIF_VALUE: 15
PIF_VALUE: 11
PIF_VALUE: 15
PIF_VALUE: 11
PIF_VALUE: 10
PIF_VALUE: 11
PIF_VALUE: 16
PIF_VALUE: 11
PIF_VALUE: 15
PIF_VALUE: 14
PIF_VALUE: 10
PIF_VALUE: 11
PIF_VALUE: 15
PIF_VALUE: 1
PIF_VALUE: 1
PIF_VALUE: 11
PIF_VALUE: 12
PIF_VALUE: 12
PIF_VALUE: 13
PIF_VALUE: 13
PIF_VALUE: 15
PIF_VALUE: 10
PIF_VALUE: 14
PIF_VALUE: 14
PIF_VALUE: 11
PIF_VALUE: 15
PIF_VALUE: 11
PIF_VALUE: 13
PIF_VALUE: 11
PIF_VALUE: 16
PIF_VALUE: 1
PIF_VALUE: 16
PIF_VALUE: 14
PIF_VALUE: 10
PIF_VALUE: 11
PIF_VALUE: 16
PIF_VALUE: 11
PIF_VALUE: 14
PIF_VALUE: 14
PIF_VALUE: 10
PIF_VALUE: 1
PIF_VALUE: 11
PIF_VALUE: 15
PIF_VALUE: 11
PIF_VALUE: 16
PIF_VALUE: 12
PIF_VALUE: 14
PIF_VALUE: 1
PIF_VALUE: 14
PIF_VALUE: 14
PIF_VALUE: 1
PIF_VALUE: 1
PIF_VALUE: 14
PIF_VALUE: 1
PIF_VALUE: 11
PIF_VALUE: 12
PIF_VALUE: 15
PIF_VALUE: 1
PIF_VALUE: 10
PIF_VALUE: 1
PIF_VALUE: 14
PIF_VALUE: 13
PIF_VALUE: 11
PIF_VALUE: 1
PIF_VALUE: 1
PIF_VALUE: 14
PIF_VALUE: 11
PIF_VALUE: 15
PIF_VALUE: 11
PIF_VALUE: 15
PIF_VALUE: 1
PIF_VALUE: 14
PIF_VALUE: 14
PIF_VALUE: 2
PIF_VALUE: 1
PIF_VALUE: 16
PIF_VALUE: 1
PIF_VALUE: 14
PIF_VALUE: 14
PIF_VALUE: 15
PIF_VALUE: 15
PIF_VALUE: 11
PIF_VALUE: 17
PIF_VALUE: 1
PIF_VALUE: 14
PIF_VALUE: 11
PIF_VALUE: 15
PIF_VALUE: 1
PIF_VALUE: 14
PIF_VALUE: 1
PIF_VALUE: 12
PIF_VALUE: 15
PIF_VALUE: 15
PIF_VALUE: 16
PIF_VALUE: 11
PIF_VALUE: 11
PIF_VALUE: 15

## 2021-08-30 ASSESSMENT — PAIN DESCRIPTION - LOCATION: LOCATION: INCISION

## 2021-08-30 ASSESSMENT — PAIN SCALES - GENERAL
PAINLEVEL_OUTOF10: 0
PAINLEVEL_OUTOF10: 6
PAINLEVEL_OUTOF10: 0

## 2021-08-30 ASSESSMENT — PAIN DESCRIPTION - PAIN TYPE: TYPE: SURGICAL PAIN

## 2021-08-30 NOTE — ANESTHESIA PRE PROCEDURE
Department of Anesthesiology  Preprocedure Note       Name:  Isael Rojo   Age:  78 y.o.  :  1955                                          MRN:  [id number]         Date:  2021      Surgeon: Bety Melvin):  Vipul Cortes MD    Procedure: Procedure(s):  CABG CORONARY ARTERY BYPASS X3, ON PUMP, SWAN, MAGNOLIA, POSSIBLE ENDO RADIAL HARVEST    Medications prior to admission:   Prior to Admission medications    Medication Sig Start Date End Date Taking? Authorizing Provider   atorvastatin (LIPITOR) 40 MG tablet Take 40 mg by mouth daily   Yes Historical Provider, MD   aspirin 81 MG EC tablet Take 81 mg by mouth daily   Yes Historical Provider, MD   pantoprazole (PROTONIX) 40 MG tablet Take 1 tablet by mouth daily 21  Yes Krissy Leal APRN - CNP   LORATADINE PO Take by mouth   Yes Historical Provider, MD   fluticasone (FLONASE) 50 MCG/ACT nasal spray USE 2 SPRAYS NASALLY AS DIRECTED ONCE DAILY.  20  Yes ISI Wetzel CNP   fluticasone (FLOVENT HFA) 110 MCG/ACT inhaler INHALE 2 PUFFS INTO THE LUNGS TWICE DAILY. 21   Krissy Mel APRN - CNP   ibuprofen (ADVIL;MOTRIN) 800 MG tablet Take 1 tablet by mouth every 6 hours as needed for Pain 18   Krissy StaISI epps - CNP       Current medications:    Current Facility-Administered Medications   Medication Dose Route Frequency Provider Last Rate Last Admin    lactated ringers infusion   IntraVENous Continuous Daron Halt, APRN -  mL/hr at 21 0603 New Bag at 21 0603    sodium chloride flush 0.9 % injection 5-40 mL  5-40 mL IntraVENous 2 times per day Daron Halt, APRN - NP        sodium chloride flush 0.9 % injection 10 mL  10 mL IntraVENous PRN Daron Halt, APRN - NP        0.9 % sodium chloride infusion  25 mL IntraVENous PRN Daron Halt, APRN - NP        aspirin EC tablet 81 mg  81 mg Oral 60 Min Pre-Op Daron Halt, APRN - NP   81 mg at 21 0654    amiodarone (CORDARONE) tablet 200 mg  200 mg Oral TID Bert Hi ISI - NP   200 mg at 08/30/21 0654    mupirocin (BACTROBAN) 2 % ointment   Nasal BID ISI Mcintosh - ALICIA        chlorhexidine (PERIDEX) 0.12 % solution 15 mL  15 mL Mouth/Throat Once ISI Mcintosh - ALICIA        chlorhexidine gluconate (ANTISEPTIC SKIN CLEANSER) 4 % solution   Topical See Admin Instructions ISI Mcintosh - NP        vancomycin (VANCOCIN) 1000 mg in dextrose 5% 200 mL IVPB  1,000 mg IntraVENous On Call to ISI Duff NP           Allergies: Allergies   Allergen Reactions    Cephalexin Other (See Comments)     Pt unsure of reaction       Problem List:    Patient Active Problem List   Diagnosis Code    Allergic rhinitis J30.9    Asthma J45.909    Hypertension I10    CAD (coronary artery disease) I25.10    Fatigue R53.83    Depression F32.9    Irritant contact dermatitis due to plants, except food L24.7       Past Medical History:        Diagnosis Date    Allergic rhinitis     Asthma     inhaler    CAD (coronary artery disease)     MI 7/26/2006; 2 stents. Cardiology: Dr Beth [facility] care maintenance     PCP:  Shannon ALVES   last seen 1/2021    Hypertension     Inguinal hernia     Irritant contact dermatitis due to plants, except food 08/17/2017    MI (my[facility] infarction) [facility]) 2006       Past Surgical History:        Procedure Laterality Date    CARDIAC CATHETERIZATION  08/23/2021    COLONOSCOPY  2004    HERNIA REPAIR      Inguinal- not sure of side       Social History:    Social History     Tobacco Use    Smoking status: Never Smoker    Smokeless tobacco: Never Used   Substance Use Topics    Alcohol use:  No                                Counseling given: Not Answered      Vital Signs (Current):   Vitals:    08/30/21 0615   BP: (!) 153/87   Pulse: 67   Resp: 18   Temp: 98.2 °F (36.8 °C)   TempSrc: Oral   Weight: 155 lb 6.8 oz (70.5 kg)   Height: 5' 10.5\" (1.791 m) BP Readings from Last 3 Encounters:   08/30/21 (!) 153/87   08/27/21 (!) 149/81   08/25/21 (!) 145/82       NPO Status: Time of last liquid consumption: 2200                        Time of last solid consumption: 1800                        Date of last liquid consumption: 08/29/21                        Date of last solid food consumption: 08/29/21    BMI:   Wt Readings from Last 3 Encounters:   08/30/21 155 lb 6.8 oz (70.5 kg)   08/27/21 163 lb (73.9 kg)   08/25/21 171 lb (77.6 kg)     Body mass index is 21.99 kg/m². CBC:   Lab Results   Component Value Date    WBC 6.0 08/27/2021    RBC 4.45 08/27/2021    RBC 4.38 02/14/2012    HGB 14.2 08/27/2021    HCT 43.8 08/27/2021    MCV 98.4 08/27/2021    RDW 11.4 08/27/2021     08/27/2021     02/14/2012       CMP:   Lab Results   Component Value Date     08/27/2021    K 4.2 08/27/2021     08/27/2021    CO2 23 08/27/2021    BUN 15 08/27/2021    CREATININE 0.91 08/27/2021    GFRAA >60 08/27/2021    LABGLOM >60 08/27/2021    GLUCOSE 95 08/27/2021    GLUCOSE 92 02/14/2012    PROT 6.7 08/27/2021    CALCIUM 9.2 08/27/2021    BILITOT 0.84 08/27/2021    ALKPHOS 75 08/27/2021    AST 23 08/27/2021    ALT 16 08/27/2021       POC Tests: No results for input(s): POCGLU, POCNA, POCK, POCCL, POCBUN, POCHEMO, POCHCT in the last 72 hours.     Coags:   Lab Results   Component Value Date    PROTIME 9.8 08/27/2021    INR 0.9 08/27/2021    APTT 23.8 08/27/2021       HCG (If Applicable): No results found for: PREGTESTUR, PREGSERUM, HCG, HCGQUANT     ABGs: No results found for: PHART, PO2ART, VNV8LTT, WWE7SXP, BEART, I7LYVJNQ     Type & Screen (If Applicable):  No results found for: LABABO, LABRH    Drug/Infectious Status (If Applicable):  No results found for: HIV, HEPCAB    COVID-19 Screening (If Applicable): No results found for: COVID19        Anesthesia Evaluation  Patient summary reviewed no history of anesthetic complications:   Airway: Mallampati: II  TM distance: >3 FB   Neck ROM: full  Mouth opening: > = 3 FB Dental:          Pulmonary:normal exam    (+) asthma: seasonal asthma,                            Cardiovascular:    (+) hypertension: no interval change, past MI: < 1 month, CAD: obstructive,       ECG reviewed  Rhythm: regular  Rate: normal  Echocardiogram reviewed                  Neuro/Psych:   Negative Neuro/Psych ROS  (+) psychiatric history:depression/anxiety             GI/Hepatic/Renal: Neg GI/Hepatic/Renal ROS            Endo/Other: Negative Endo/Other ROS                    Abdominal:             Vascular: negative vascular ROS. Other Findings:             Anesthesia Plan      general     ASA 4       Induction: intravenous. arterial line, BIS, central line, CVP and MAGNOLIA    Anesthetic plan and risks discussed with patient. Use of blood products discussed with patient whom consented to blood products. Plan discussed with CRNA.                   Shelton Florian MD   8/30/2021

## 2021-08-30 NOTE — PLAN OF CARE
Problem: Non-Violent Restraints  Goal: Removal from restraints as soon as assessed to be safe  8/30/2021 1820 by Vladimir Madison RN  Outcome: Completed  8/30/2021 1358 by Favio Wallis RCP  Outcome: Ongoing  Goal: No harm/injury to patient while restraints in use  8/30/2021 1820 by Vladimir Madison RN  Outcome: Completed  8/30/2021 1358 by Favio Wallis RCP  Outcome: Ongoing  Goal: Patient's dignity will be maintained  8/30/2021 1820 by Vladimir Madison RN  Outcome: Completed  8/30/2021 1358 by Favio Wallis RCP  Outcome: Ongoing

## 2021-08-30 NOTE — H&P
[facility] Cardiothoracic Surgery  History andPhysical    Patient's Name/Date of Birth: Javier Kelsey / 1955 (4 y.o.)    Date: August 29, 2021     Chief Complaint: MVCAD after elective cath-stable again throughout the  past year    HPI: Javier Kelsey is a 78 y.o. male who presents to [facility] today for CABG surgery after MVCAD noted after cardiac cath. Pt does not take ay AC other than ASA. No significant co morbidities. Strong family Hx of heart disease. Does not drink smoke or use drugs. ROS:  Review of Systems   Constitutional: Negative. HENT: Negative. Eyes: Negative. Respiratory: Negative. Cardiovascular: Negative. Gastrointestinal: Negative. Endocrine: Negative. Genitourinary: Negative. Allergic/Immunologic: Negative. Neurological: Negative. Hematological: Negative. Past Medical History:   Diagnosis Date    Allergic rhinitis     Asthma     inhaler    CAD (coronary artery disease)     MI 7/26/2006; 2 stents.   Cardiology: Dr Sandoval [facility] care maintenance     PCP:  Adolfo ALVES   last seen 1/2021    Hypertension     Inguinal hernia     Irritant contact dermatitis due to plants, except food 08/17/2017    MI (myocardial infarction) [facility]) 2006     Past Surgical History:   Procedure Laterality Date    CARDIAC CATHETERIZATION  08/23/2021    COLONOSCOPY  2004    HERNIA REPAIR      Inguinal- not sure of side     Allergies   Allergen Reactions    Cephalexin Other (See Comments)     Pt unsure of reaction     Family History   Problem Relation Age of Onset    Stroke Father 67     Social History     Socioeconomic History    Marital status:      Spouse name: Not on file    Number of children: Not on file    Years of education: Not on file    Highest education level: Not on file   Occupational History    Not on file   Tobacco Use    Smoking status: Never Smoker    Smokeless tobacco: Never Used   Substance and Sexual Activity    Alcohol use: No    Drug use: No    Sexual activity: Not on file   Other Topics Concern    Not on file   Social History Narrative    Not on file     Social Determinants of Health     Financial Resource Strain:     Difficulty of Paying Living Expenses:    Food Insecurity:     Worried About Running Out of Food in the Last Year:     [address] in the Last Year:    Transportation Needs:     Lack of Transportation (Medical):  Lack of Transportation (Non-Medical):    Physical Activity:     Days of Exercise per Week:     Minutes of Exercise per Session:    Stress:     Feeling of Stress :    Social Connections:     Frequency of Communication with Friends and Family:     Frequency of Social Gatherings with Friends and Family:     Attends Congregational Services:     Active Member of Clubs or Organizations:     Attends Club or Organization Meetings:     Marital Status:    Intimate Partner Violence:     Fear of Current or Ex-Partner:     Emotionally Abused:     Physically Abused:     Sexually Abused:        No current facility-administered medications for this encounter. Current Outpatient Medications   Medication Sig Dispense Refill    atorvastatin (LIPITOR) 40 MG tablet Take 40 mg by mouth daily      aspirin 81 MG EC tablet Take 81 mg by mouth daily      metoprolol tartrate (LOPRESSOR) 25 MG tablet Take 0.5 tablets by mouth 2 times daily Discussed with the patient and his wife to hold if heart rate less than 60. 30 tablet 1    pantoprazole (PROTONIX) 40 MG tablet Take 1 tablet by mouth daily 90 tablet 3    fluticasone (FLOVENT HFA) 110 MCG/ACT inhaler INHALE 2 PUFFS INTO THE LUNGS TWICE DAILY. 36 g 3    LORATADINE PO Take by mouth      fluticasone (FLONASE) 50 MCG/ACT nasal spray USE 2 SPRAYS NASALLY AS DIRECTED ONCE DAILY. 48 g 5    ibuprofen (ADVIL;MOTRIN) 800 MG tablet Take 1 tablet by mouth every 6 hours as needed for Pain 90 tablet 0       Physical Exam:  There were no vitals filed for this visit.   Weight: No intake/output data recorded. Physical Exam  HENT:      Head: Normocephalic. Mouth/Throat:      Mouth: Mucous membranes are moist.   Cardiovascular:      Rate and Rhythm: Normal rate. Pulses: Normal pulses. Pulmonary:      Effort: Pulmonary effort is normal.   Abdominal:      General: Abdomen is flat. Musculoskeletal:         General: Normal range of motion. Cervical back: Normal range of motion. Skin:     General: Skin is warm. Capillary Refill: Capillary refill takes less than 2 seconds. Neurological:      Mental Status: He is alert. Psychiatric:         Mood and Affect: Mood normal.         Data:    CBC:   Recent Labs     08/27/21  1151   WBC 6.0   HGB 14.2   HCT 43.8   MCV 98.4        BMP:   Recent Labs     08/27/21  1151      K 4.2      CO2 23   BUN 15   CREATININE 0.91     Accucheck Glucoses:   No results for input(s): POCGLU in the last 72 hours. Cardiac Enzymes: No results for input(s): CKTOTAL, CKMB, CKMBINDEX, TROPONINI in the last 72 hours.   PTT/PT/INR:  Recent Labs     08/27/21  1151   PROTIME 9.8   INR 0.9     Recent Labs     08/27/21  1151   APTT 23.8     Liver Profile:  Lab Results   Component Value Date    AST 23 08/27/2021    ALT 16 08/27/2021    BILITOT 0.84 08/27/2021    ALKPHOS 75 08/27/2021     Lab Results   Component Value Date    CHOL 172 01/30/2020    HDL 78 01/30/2020    TRIG 40 01/30/2020     TSH:   Lab Results   Component Value Date    TSH 1.17 02/24/2014     UA:  Lab Results   Component Value Date    COLORU YELLOW 08/27/2021    PHUR 5.0 08/27/2021    SPECGRAV 1.020 08/27/2021    LEUKOCYTESUR NEGATIVE 08/27/2021    UROBILINOGEN Normal 08/27/2021    BILIRUBINUR NEGATIVE 08/27/2021    GLUCOSEU NEGATIVE 08/27/2021       Assessment & Plan:  Patient Active Problem List   Diagnosis    Allergic rhinitis    Asthma    Hypertension    CAD (coronary artery disease)    Fatigue    Depression    Irritant contact dermatitis due to plants, except food     Plan:  Pt NPO  Informed consents done outpt, under media tab. No AC  covid vaccinated  Review for endo radial harvest in AM  No Hx of Afib.ib          The above recommendations including medications and orders were discussed and agreed upon with  the attending on service for thecardiothoracic surgery group today.      [address] [facility], APRN - NP, APRN CNP

## 2021-08-30 NOTE — PROGRESS NOTES
Patient to room 1024 accompanied by CVOR RN x2, CVOR CRNA and perfusion. Pt transported via bed, hooked up to appropriate hemodynamic monitoring devices, mechanical ventilation, and all lines transduced per protocol. Care transferred at bedside to CVICU RN from CRNA.

## 2021-08-30 NOTE — PROGRESS NOTES
08/30/21 9459   Vent Information   $Ventilation Off Vent         Order obtained for extubation. SpO2 of 100% on 40% FiO2. Patient extubated and placed on 6 liters/min via nasal cannula. Post extubation SpO2 is 100% with HR  68 bpm and RR 16 breaths/min. Patient had strong cough that was non-productive. Extubation Well tolerated by patient. .   Breath Sounds: clear    AMERICA MARADIAGA RCP   5:53 PM

## 2021-08-30 NOTE — FLOWSHEET NOTE
78year old male admitted to [facility] for surgery this AM.  Attached to monitor. Vitals obtained. Admission history completed. Patient shaved and prep wipes completed pre op. IV started in RFA with #18 justinn. Patient wife Jonnathan Neves at bedside. Patient blood band and wrist bands on.

## 2021-08-30 NOTE — ANESTHESIA PROCEDURE NOTES
Arterial Line:    An arterial line was placed using surface landmarks, in the pre-op for the following indication(s): continuous blood pressure monitoring. A 20 gauge (size), 1 and 3/4 inch (length), Arrow (type) catheter was placed, Seldinger technique not used, into the right brachial artery, secured by Tegaderm and tape. Anesthesia type: General    Events:  patient tolerated procedure well with no complications and EBL < 5mL.   8/30/2021 8:33 AM8/30/2021 8:37 AM  Anesthesiologist: Lyndon Villalobos MD  Resident/CRNA: ISI Gillette CRNA  Performed: Anesthesiologist   Preanesthetic Checklist  Completed: patient identified, IV checked, site marked, risks and benefits discussed, surgical consent, monitors and equipment checked, pre-op evaluation, timeout performed, anesthesia consent given, oxygen available and patient being monitored
Central Venous Line:    A central venous line was placed using ultrasound guidance, in the OR for the following indication(s):     Sterility preparation included the following: hand hygiene performed prior to procedure, maximum sterile barriers used and sterile technique used to drape from head to toe. The patient was placed in Trendelenburg position. The left subclavian vein was prepped. The site was prepped with Chloraprep. introducer slick was placed. During the procedure, the following specific steps were taken: target vein identified, needle advanced into vein and blood aspirated and guidewire advanced into vein. Intravenous verification was obtained by venous blood return and MAGNOLIA. Post insertion care included: all ports aspirated, all ports flushed easily, guidewire removed intact, Biopatch applied, line sutured in place and dressing applied. During the procedure the patient experienced: patient tolerated procedure well with no complications. Neville Holloway .No  Staffing  Anesthesiologist: Maite Lafleur MD  Preanesthetic Checklist  Completed: patient identified, IV checked, site marked, risks and benefits discussed, surgical consent, monitors and equipment checked, pre-op evaluation, timeout performed, anesthesia consent given, oxygen available and patient being monitored
normal.      Septa    Atrial Septum:  Intra-atrial septal morphology normal.      Ventricular Septum:  Intra-ventricular septum morphology normal.      Diastolic Function Measurements:  Diastolic Dysfunction Grade= I (Delayed relaxation)  E= ms  A= ms  E/A Ratio=   DT= ms  S/D=  IVRT=    Other Findings  Pericardium:  normal  Pleural Effusion:  none  Pulmonary Arteries:  normal  Pulmonary Venous Flow:  normal    Anesthesia Information  Performed Personally  Anesthesiologist:  Myra Solis MD      Echocardiogram Comments:       Off pump no RWMAs and EF 55%.  Aorta no dissection

## 2021-08-30 NOTE — BRIEF OP NOTE
Brief Postoperative Note      Patient: Dima Jacobs  YOB: 1955  MRN: [id number]    Date of Procedure: 8/30/2021    Pre-Op Diagnosis: CORONARY ARTERY DISEASE    Post-Op Diagnosis: Same       Procedure(s):  CABG CORONARY ARTERY BYPASS X3, ON PUMP, SWAN, MAGNOLIA, POSSIBLE ENDO RADIAL HARVEST    Surgeon(s):  Marcia Chatterjee MD    Assistant:  First Assistant: SOSA Quach; Job Greene RN    Anesthesia: General    Estimated Blood Loss (mL): Minimal    Complications: None    Specimens:   * No specimens in log *    Implants:  * No implants in log *      Drains:   Urethral Catheter Non-latex 16 fr (Active)       Findings: NA    Electronically signed by SOSA Quach on 8/30/2021 at 12:38 PM

## 2021-08-31 ENCOUNTER — APPOINTMENT (OUTPATIENT)
Dept: GENERAL RADIOLOGY | Age: 66
DRG: 236 | End: 2021-08-31
Attending: THORACIC SURGERY (CARDIOTHORACIC VASCULAR SURGERY)
Payer: COMMERCIAL

## 2021-08-31 LAB
ABO/RH: NORMAL
ANION GAP SERPL CALCULATED.3IONS-SCNC: 8 MMOL/L (ref 9–17)
ANTIBODY SCREEN: NEGATIVE
ARM BAND NUMBER: NORMAL
BLD PROD TYP BPU: NORMAL
BLD PROD TYP BPU: NORMAL
BUN BLDV-MCNC: 14 MG/DL (ref 8–23)
BUN/CREAT BLD: ABNORMAL (ref 9–20)
CALCIUM IONIZED: 1.17 MMOL/L (ref 1.13–1.33)
CALCIUM SERPL-MCNC: 8.2 MG/DL (ref 8.6–10.4)
CHLORIDE BLD-SCNC: 109 MMOL/L (ref 98–107)
CO2: 23 MMOL/L (ref 20–31)
CREAT SERPL-MCNC: 0.68 MG/DL (ref 0.7–1.2)
CROSSMATCH RESULT: NORMAL
CROSSMATCH RESULT: NORMAL
DISPENSE STATUS BLOOD BANK: NORMAL
DISPENSE STATUS BLOOD BANK: NORMAL
EKG ATRIAL RATE: 55 BPM
EKG P AXIS: 75 DEGREES
EKG P-R INTERVAL: 182 MS
EKG Q-T INTERVAL: 478 MS
EKG QRS DURATION: 82 MS
EKG QTC CALCULATION (BAZETT): 457 MS
EKG R AXIS: 72 DEGREES
EKG T AXIS: 65 DEGREES
EKG VENTRICULAR RATE: 55 BPM
EXPIRATION DATE: NORMAL
GFR AFRICAN AMERICAN: >60 ML/MIN
GFR NON-AFRICAN AMERICAN: >60 ML/MIN
GFR SERPL CREATININE-BSD FRML MDRD: ABNORMAL ML/MIN/{1.73_M2}
GFR SERPL CREATININE-BSD FRML MDRD: ABNORMAL ML/MIN/{1.73_M2}
GLUCOSE BLD-MCNC: 102 MG/DL (ref 70–99)
GLUCOSE BLD-MCNC: 104 MG/DL (ref 75–110)
GLUCOSE BLD-MCNC: 126 MG/DL (ref 75–110)
GLUCOSE BLD-MCNC: 132 MG/DL (ref 75–110)
GLUCOSE BLD-MCNC: 138 MG/DL (ref 75–110)
GLUCOSE BLD-MCNC: 138 MG/DL (ref 75–110)
GLUCOSE BLD-MCNC: 155 MG/DL (ref 75–110)
GLUCOSE BLD-MCNC: 156 MG/DL (ref 75–110)
GLUCOSE BLD-MCNC: 169 MG/DL (ref 75–110)
GLUCOSE BLD-MCNC: 99 MG/DL (ref 75–110)
HCT VFR BLD CALC: 30.1 % (ref 40.7–50.3)
HEMOGLOBIN: 10.1 G/DL (ref 13–17)
INR BLD: 1.1
MAGNESIUM: 2.2 MG/DL (ref 1.6–2.6)
MCH RBC QN AUTO: 32.7 PG (ref 25.2–33.5)
MCHC RBC AUTO-ENTMCNC: 33.6 G/DL (ref 28.4–34.8)
MCV RBC AUTO: 97.4 FL (ref 82.6–102.9)
NRBC AUTOMATED: 0 PER 100 WBC
PDW BLD-RTO: 11.7 % (ref 11.8–14.4)
PLATELET # BLD: ABNORMAL K/UL (ref 138–453)
PLATELET, FLUORESCENCE: 117 K/UL (ref 138–453)
PLATELET, IMMATURE FRACTION: 3.8 % (ref 1.1–10.3)
PMV BLD AUTO: ABNORMAL FL (ref 8.1–13.5)
POC ANGLE TEG W HEP: 68.1 DEG (ref 59–74)
POC ANGLE TEG W HEP: 69.5 DEG (ref 59–74)
POC ANGLE TEG: 69.2 DEG (ref 59–74)
POC ANGLE TEG: 69.7 DEG (ref 59–74)
POC EPL TEG W/HEP: 0.2 % (ref 0–15)
POC EPL TEG W/HEP: NORMAL % (ref 0–15)
POC EPL TEG: 0 % (ref 0–15)
POC EPL TEG: NORMAL % (ref 0–15)
POC KINETICS TEG W HEP: 1.3 MIN (ref 1–3)
POC KINETICS TEG W HEP: 1.6 MIN (ref 1–3)
POC KINETICS TEG: 1.4 MIN (ref 1–3)
POC KINETICS TEG: 1.4 MIN (ref 1–3)
POC LY30(LYSIS) TEG W HEP: 0.2 % (ref 0–8)
POC LY30(LYSIS) TEG W HEP: NORMAL % (ref 0–8)
POC LY30(LYSIS) TEG: 0 % (ref 0–8)
POC LY30(LYSIS) TEG: NORMAL % (ref 0–8)
POC MA(MAX CLOT) TEG: 67.4 MM (ref 55–74)
POC MA(MAX CLOT) TEG: 70 MM (ref 55–74)
POC MAX CLOT TEG W HEP: 66.5 MM (ref 55–74)
POC MAX CLOT TEG W HEP: 69.2 MM (ref 55–74)
POC REACTION TIME TEG W HEP: 4.9 MIN (ref 4–9)
POC REACTION TIME TEG W HEP: 7.4 MIN (ref 4–9)
POC REACTION TIME TEG: 4.8 MIN (ref 4–9)
POC REACTION TIME TEG: 7.1 MIN (ref 4–9)
POTASSIUM SERPL-SCNC: 4.1 MMOL/L (ref 3.7–5.3)
PROTHROMBIN TIME: 11.4 SEC (ref 9.1–12.3)
RBC # BLD: 3.09 M/UL (ref 4.21–5.77)
SODIUM BLD-SCNC: 140 MMOL/L (ref 135–144)
TEG COMMENT: NORMAL
TRANSFUSION STATUS: NORMAL
TRANSFUSION STATUS: NORMAL
UNIT DIVISION: 0
UNIT DIVISION: 0
UNIT NUMBER: NORMAL
UNIT NUMBER: NORMAL
WBC # BLD: 11.8 K/UL (ref 3.5–11.3)

## 2021-08-31 PROCEDURE — 51701 INSERT BLADDER CATHETER: CPT

## 2021-08-31 PROCEDURE — 2100000001 HC CVICU R&B

## 2021-08-31 PROCEDURE — 99024 POSTOP FOLLOW-UP VISIT: CPT | Performed by: NURSE PRACTITIONER

## 2021-08-31 PROCEDURE — 80048 BASIC METABOLIC PNL TOTAL CA: CPT

## 2021-08-31 PROCEDURE — 6360000002 HC RX W HCPCS: Performed by: NURSE PRACTITIONER

## 2021-08-31 PROCEDURE — 94761 N-INVAS EAR/PLS OXIMETRY MLT: CPT

## 2021-08-31 PROCEDURE — 85610 PROTHROMBIN TIME: CPT

## 2021-08-31 PROCEDURE — 97166 OT EVAL MOD COMPLEX 45 MIN: CPT

## 2021-08-31 PROCEDURE — 6370000000 HC RX 637 (ALT 250 FOR IP): Performed by: NURSE PRACTITIONER

## 2021-08-31 PROCEDURE — 97162 PT EVAL MOD COMPLEX 30 MIN: CPT

## 2021-08-31 PROCEDURE — 82330 ASSAY OF CALCIUM: CPT

## 2021-08-31 PROCEDURE — 6370000000 HC RX 637 (ALT 250 FOR IP): Performed by: THORACIC SURGERY (CARDIOTHORACIC VASCULAR SURGERY)

## 2021-08-31 PROCEDURE — 51798 US URINE CAPACITY MEASURE: CPT

## 2021-08-31 PROCEDURE — 2580000003 HC RX 258: Performed by: NURSE PRACTITIONER

## 2021-08-31 PROCEDURE — 97116 GAIT TRAINING THERAPY: CPT

## 2021-08-31 PROCEDURE — 94640 AIRWAY INHALATION TREATMENT: CPT

## 2021-08-31 PROCEDURE — 85027 COMPLETE CBC AUTOMATED: CPT

## 2021-08-31 PROCEDURE — 85055 RETICULATED PLATELET ASSAY: CPT

## 2021-08-31 PROCEDURE — 71045 X-RAY EXAM CHEST 1 VIEW: CPT

## 2021-08-31 PROCEDURE — 97535 SELF CARE MNGMENT TRAINING: CPT

## 2021-08-31 PROCEDURE — 83735 ASSAY OF MAGNESIUM: CPT

## 2021-08-31 RX ORDER — TAMSULOSIN HYDROCHLORIDE 0.4 MG/1
0.4 CAPSULE ORAL DAILY
Status: DISCONTINUED | OUTPATIENT
Start: 2021-08-31 | End: 2021-09-02 | Stop reason: HOSPADM

## 2021-08-31 RX ORDER — MAGNESIUM HYDROXIDE/ALUMINUM HYDROXICE/SIMETHICONE 120; 1200; 1200 MG/30ML; MG/30ML; MG/30ML
15 SUSPENSION ORAL EVERY 6 HOURS PRN
Status: DISCONTINUED | OUTPATIENT
Start: 2021-08-31 | End: 2021-09-02 | Stop reason: HOSPADM

## 2021-08-31 RX ADMIN — IPRATROPIUM BROMIDE AND ALBUTEROL SULFATE 1 AMPULE: .5; 3 SOLUTION RESPIRATORY (INHALATION) at 16:42

## 2021-08-31 RX ADMIN — TAMSULOSIN HYDROCHLORIDE 0.4 MG: 0.4 CAPSULE ORAL at 21:56

## 2021-08-31 RX ADMIN — MUPIROCIN: 20 OINTMENT TOPICAL at 08:01

## 2021-08-31 RX ADMIN — MUPIROCIN: 20 OINTMENT TOPICAL at 19:37

## 2021-08-31 RX ADMIN — ASPIRIN 81 MG: 81 TABLET, COATED ORAL at 08:01

## 2021-08-31 RX ADMIN — IPRATROPIUM BROMIDE AND ALBUTEROL SULFATE 1 AMPULE: .5; 3 SOLUTION RESPIRATORY (INHALATION) at 12:44

## 2021-08-31 RX ADMIN — AMIODARONE HYDROCHLORIDE 200 MG: 200 TABLET ORAL at 19:37

## 2021-08-31 RX ADMIN — VANCOMYCIN HYDROCHLORIDE 1000 MG: 1 INJECTION, SOLUTION INTRAVENOUS at 19:57

## 2021-08-31 RX ADMIN — POTASSIUM CHLORIDE 20 MEQ: 1500 TABLET, EXTENDED RELEASE ORAL at 06:42

## 2021-08-31 RX ADMIN — AMIODARONE HYDROCHLORIDE 200 MG: 200 TABLET ORAL at 13:13

## 2021-08-31 RX ADMIN — ATORVASTATIN CALCIUM 80 MG: 80 TABLET, FILM COATED ORAL at 19:37

## 2021-08-31 RX ADMIN — MAGNESIUM HYDROXIDE 30 ML: 400 SUSPENSION ORAL at 11:53

## 2021-08-31 RX ADMIN — VANCOMYCIN HYDROCHLORIDE 1000 MG: 1 INJECTION, SOLUTION INTRAVENOUS at 08:01

## 2021-08-31 RX ADMIN — AMIODARONE HYDROCHLORIDE 200 MG: 200 TABLET ORAL at 08:01

## 2021-08-31 RX ADMIN — PANTOPRAZOLE SODIUM 40 MG: 40 TABLET, DELAYED RELEASE ORAL at 08:01

## 2021-08-31 RX ADMIN — SODIUM CHLORIDE, PRESERVATIVE FREE 10 ML: 5 INJECTION INTRAVENOUS at 19:38

## 2021-08-31 RX ADMIN — IPRATROPIUM BROMIDE AND ALBUTEROL SULFATE 1 AMPULE: .5; 3 SOLUTION RESPIRATORY (INHALATION) at 09:22

## 2021-08-31 RX ADMIN — CLOPIDOGREL 75 MG: 75 TABLET, FILM COATED ORAL at 08:01

## 2021-08-31 RX ADMIN — OXYCODONE HYDROCHLORIDE AND ACETAMINOPHEN 1 TABLET: 5; 325 TABLET ORAL at 08:00

## 2021-08-31 RX ADMIN — POLYETHYLENE GLYCOL 3350 17 G: 17 POWDER, FOR SOLUTION ORAL at 08:01

## 2021-08-31 RX ADMIN — SODIUM CHLORIDE, PRESERVATIVE FREE 10 ML: 5 INJECTION INTRAVENOUS at 08:01

## 2021-08-31 RX ADMIN — OXYCODONE HYDROCHLORIDE AND ACETAMINOPHEN 1 TABLET: 5; 325 TABLET ORAL at 19:37

## 2021-08-31 ASSESSMENT — PAIN SCALES - GENERAL
PAINLEVEL_OUTOF10: 0
PAINLEVEL_OUTOF10: 3
PAINLEVEL_OUTOF10: 1
PAINLEVEL_OUTOF10: 1
PAINLEVEL_OUTOF10: 0
PAINLEVEL_OUTOF10: 4
PAINLEVEL_OUTOF10: 7

## 2021-08-31 ASSESSMENT — PAIN DESCRIPTION - DESCRIPTORS
DESCRIPTORS: DISCOMFORT
DESCRIPTORS: DISCOMFORT

## 2021-08-31 ASSESSMENT — PAIN DESCRIPTION - ORIENTATION: ORIENTATION: MID

## 2021-08-31 ASSESSMENT — PAIN DESCRIPTION - PAIN TYPE
TYPE: SURGICAL PAIN

## 2021-08-31 ASSESSMENT — PAIN DESCRIPTION - LOCATION
LOCATION: INCISION;CHEST
LOCATION: INCISION
LOCATION: INCISION

## 2021-08-31 NOTE — CONSULTS
[facility]    Inpatient Consultation Note               Today's Date: 8/31/2021  Patient Name: Rosa Elena Zamora  Date of admission: 8/30/2021  5:52 AM  Patient's age: 78 y.o., 1955  Admission Dx: CAD in native artery [I25.10]    Reason for  Consult:  Martin Shelby    Requesting Physician: Mireya Mueller MD    CHIEF COMPLAINT:     No chief complaint on file. History Obtained From:  patient, electronic medical record    HISTORY OF PRESENT ILLNESS:    78year old male presented to [facility] for an elective CABG. Underwent a coronary angiography for an abnormal EKG portion of his stress test. Subsequent coronary angiography revealed MV-CAD. Presented on 8/29/21 for CABG after outpatient pre-op work up by CT surgery. Post CABG successfully extubated, currently on 2 L O2 via NC. Pressors weaned off. Reports no significant symptoms except for discomfort at incision site. Past Medical History:   has a past medical history of Allergic rhinitis, Asthma, CAD (coronary artery disease), Health care maintenance, Hypertension, Inguinal hernia, Irritant contact dermatitis due to plants, except food, and MI (myocardial infarction) ([facility] [address] Past Surgical History:   has a past surgical history that includes Colonoscopy (2004); Cardiac catheterization (08/23/2021); and hernia repair. Home Medications:    Prior to Admission medications    Medication Sig Start Date End Date Taking? Authorizing Provider   atorvastatin (LIPITOR) 40 MG tablet Take 40 mg by mouth daily   Yes Historical Provider, MD   aspirin 81 MG EC tablet Take 81 mg by mouth daily   Yes Historical Provider, MD   pantoprazole (PROTONIX) 40 MG tablet Take 1 tablet by mouth daily 1/5/21  Yes ISI Stover CNP   LORATADINE PO Take by mouth   Yes Historical Provider, MD   fluticasone (FLONASE) 50 MCG/ACT nasal spray USE 2 SPRAYS NASALLY AS DIRECTED ONCE DAILY.  1/30/20  Yes ISI Lamar CNP   fluticasone (FLOVENT HFA) 110 MCG/ACT inhaler INHALE 2 PUFFS INTO THE LUNGS TWICE DAILY. 1/5/21   ISI John CNP   ibuprofen (ADVIL;MOTRIN) 800 MG tablet Take 1 tablet by mouth every 6 hours as needed for Pain 5/7/18   ISI John CNP        Current Facility-Administered Medications: potassium chloride (KLOR-CON M) extended release tablet 40 mEq, 40 mEq, Oral, PRN **OR** potassium bicarb-citric acid (EFFER-K) effervescent tablet 40 mEq, 40 mEq, Oral, PRN **OR** potassium chloride 10 mEq/100 mL IVPB (Peripheral Line), 10 mEq, IntraVENous, PRN  sodium chloride flush 0.9 % injection 10 mL, 10 mL, IntraVENous, 2 times per day  sodium chloride flush 0.9 % injection 10 mL, 10 mL, IntraVENous, PRN  0.9 % sodium chloride infusion, 25 mL, IntraVENous, PRN  ondansetron (ZOFRAN) injection 4 mg, 4 mg, IntraVENous, Q8H PRN  aspirin EC tablet 81 mg, 81 mg, Oral, Daily  clopidogrel (PLAVIX) tablet 75 mg, 75 mg, Oral, Daily  acetaminophen (TYLENOL) tablet 650 mg, 650 mg, Oral, Q4H PRN  oxyCODONE-acetaminophen (PERCOCET) 5-325 MG per tablet 1 tablet, 1 tablet, Oral, Q4H PRN **OR** oxyCODONE-acetaminophen (PERCOCET) 5-325 MG per tablet 2 tablet, 2 tablet, Oral, Q4H PRN  fentaNYL (SUBLIMAZE) injection 25 mcg, 25 mcg, IntraVENous, Q1H PRN **OR** fentaNYL (SUBLIMAZE) injection 50 mcg, 50 mcg, IntraVENous, Q1H PRN  amiodarone (CORDARONE) tablet 200 mg, 200 mg, Oral, TID  hydrALAZINE (APRESOLINE) injection 5 mg, 5 mg, IntraVENous, Q5 Min PRN  metoprolol (LOPRESSOR) injection 2.5 mg, 2.5 mg, IntraVENous, Q10 Min PRN  mupirocin (BACTROBAN) 2 % ointment, , Nasal, BID  sodium bicarbonate 8.4 % injection 50 mEq, 50 mEq, IntraVENous, Q30 Min PRN  diphenhydrAMINE (BENADRYL) tablet 25 mg, 25 mg, Oral, Nightly PRN  polyethylene glycol (GLYCOLAX) packet 17 g, 17 g, Oral, Daily  bisacodyl (DULCOLAX) EC tablet 5 mg, 5 mg, Oral, Daily PRN  fleet rectal enema 1 enema, 1 enema, Rectal, Daily PRN  metoprolol tartrate (LOPRESSOR) tablet 25 mg, 25 mg, Oral, BID  atorvastatin (LIPITOR) tablet 80 mg, 80 mg, Oral, Nightly  pantoprazole (PROTONIX) tablet 40 mg, 40 mg, Oral, Daily  pantoprazole (PROTONIX) injection 40 mg, 40 mg, IntraVENous, Daily **AND** sodium chloride (PF) 0.9 % injection 10 mL, 10 mL, IntraVENous, Daily  vancomycin (VANCOCIN) 1000 mg in dextrose 5% 200 mL IVPB, 1,000 mg, IntraVENous, Q12H  calcium chloride 1,000 mg in sodium chloride 0.9 % 100 mL IVPB, 1,000 mg, IntraVENous, PRN  magnesium sulfate 1000 mg in dextrose 5% 100 mL IVPB, 1,000 mg, IntraVENous, PRN  potassium chloride 20 mEq/50 mL IVPB (Central Line), 20 mEq, IntraVENous, PRN  ipratropium-albuterol (DUONEB) nebulizer solution 1 ampule, 1 ampule, Inhalation, Q4H WA  albumin human 5 % IV solution 25 g, 25 g, IntraVENous, PRN  norepinephrine (LEVOPHED) 16 mg in sodium chloride 0.9 % 250 mL infusion, 0.04 mcg/kg/min, IntraVENous, Continuous PRN  insulin regular (HUMULIN R;NOVOLIN R) 100 Units in sodium chloride 0.9 % 100 mL infusion, 1 Units/hr, IntraVENous, Continuous  insulin glargine (LANTUS) injection vial 11 Units, 0.15 Units/kg, SubCUTAneous, Nightly  glucose (GLUTOSE) 40 % oral gel 15 g, 15 g, Oral, PRN  dextrose 50 % IV solution, 12.5 g, IntraVENous, PRN  glucagon (rDNA) injection 1 mg, 1 mg, IntraMUSCular, PRN  dextrose 5 % solution, 100 mL/hr, IntraVENous, PRN  EPINEPHrine (EPINEPHrine HCL) 5 mg in dextrose 5 % 250 mL infusion, 0.02-0.07 mcg/kg/min, IntraVENous, Continuous PRN  propofol injection, 5-50 mcg/kg/min, IntraVENous, Titrated    Allergies:  Cephalexin    Social History:   reports that he has never smoked. He has never used smokeless tobacco. He reports that he does not drink alcohol and does not use drugs. Family History: family history includes Stroke (age of onset: 67) in his father. REVIEW OF SYSTEMS:      Constitutional: there has been no unanticipated weight loss. Eyes: No visual changes or diplopia.    ENT: No Headaches  Cardiovascular:  Remaining as above  Respiratory: No cough  Gastrointestinal: No abdominal pain. No change in bowel or bladder habits. Genitourinary: No dysuria, trouble voiding, or hematuria. Musculoskeletal: No joint complaints. Neurological: No headache  Hematologic/Lymphatic: No abnormal bruising or bleeding      PHYSICAL EXAM:      BP (!) 100/59   Pulse 65   Temp 98.4 °F (36.9 °C) (Core)   Resp 21   Ht 5' 10.5\" (1.791 m)   Wt 169 lb 8.5 oz (76.9 kg)   SpO2 100%   BMI 23.98 kg/m²      Intake/Output Summary (Last 24 hours) at 8/31/2021 1366  Last data filed at 8/31/2021 0700  Gross per 24 hour   Intake 7044 ml   Output 3300 ml   Net 3744 ml         Constitutional and General Appearance:   Alert, cooperative, no distress and appears stated age  Respiratory:  No for increased work of breathing. On auscultation: diminished breath sounds bilaterally  Chest tubes in place  Cardiovascular:  Regular S1 and S2. No murmurs  Abdomen:   No masses or tenderness  Bowel sounds present  Extremities:   No Cyanosis or Clubbing   Lower extremity edema: trace  Neurological:  Alert and oriented. DATA:    Diagnostics:    EKG:   SB  ECHO:  8/23/21  LVEF 52%, GLS -16.2%, AV sclerotic but otherwise normal, trivial TR    Cardiac Angiography:   LMCA: Normal 0% stenosis. LAD: Mid area at D2 75% stenosis. D2: Ostial 60% stenosis  LCx: Mid 100% stenosis in stent. OM: seen filling by collateral  RCA: Dominant. Ostial 50% stenosis. PDA: minimal disease. PLV1: Ostial 90% stenosis  Ramus: upper branch with no disease. Lower branch 80% proximal area     Coronary Tree Dominance: Right  LV AnalysisLV function assessed as:Normal.    Labs:     CBC:   Recent Labs     08/30/21  1403 08/30/21  1403 08/30/21  2147 08/31/21  0409   WBC 20.6*  --   --  11.8*   HGB 12.2*   < > 10.0* 10.1*   HCT 35.7*   < > 29.8* 30.1*     --   --  See Reflexed IPF Result    < > = values in this interval not displayed.      BMP:   Recent Labs     08/30/21  1403 08/30/21  1403 occurred. Attestation signed by      Attending Physician Statement:    I have discussed the care of  Flynn July , including pertinent history and exam findings, with the Cardiology fellow/resident. I have seen and examined the patient and the key elements of all parts of the encounter have been performed by me. I agree with the assessment, plan and orders as documented by the fellow/resident, after I modified exam findings and plan of treatments, and the final version is my approved version of the assessment. Additional Comments:   MV CAD s/p CABG on 8/30/21  HTN  DL  - progressing well post op  - routine care per CTS  - will follow    Abraham Meza DO, [facility], [address], [address], [place] [address] [facility]  [facility]  [phone]

## 2021-08-31 NOTE — PROGRESS NOTES
Occupational Therapy   Occupational Therapy Initial Assessment  Date: 2021   Patient Name: Michell Santiago  MRN: [id number]     : 1955    Date of Service: 2021      Copied from chart:  Oleksandr Fernández is a 78 y.o. male who presents to [facility] today for CABG surgery after MVCAD noted after cardiac cath. Pt does not take ay AC other than ASA. No significant co morbidities. Strong family Hx of heart disease. Does not drink smoke or use drugs. Discharge Recommendations:  Patient would benefit from continued therapy after discharge. Pt is expected to be safe to return to OF with assist in order to safely engage in ADLs, IADLs, and func mob. OT Equipment Recommendations  Equipment Needed: Yes  Mobility Devices: ADL Assistive Devices  ADL Assistive Devices: Long-handled Sponge    Assessment   Performance deficits / Impairments: Decreased functional mobility ; Decreased endurance;Decreased ADL status; Decreased high-level IADLs  Prognosis: Good  Decision Making: Medium Complexity  OT Education: OT Role;Plan of Care;Equipment;Precautions; ADL Adaptive Strategies;Transfer Training  Patient Education: Pt educated on sternal precautions and use of heart hugger, educated/demo'd use of incentive spirometer, pt educated on POC, educated on safe transfer training, importance of OOB activity after CABG- good return  Barriers to Learning: none  REQUIRES OT FOLLOW UP: Yes  Activity Tolerance  Activity Tolerance: Patient Tolerated treatment well  Safety Devices  Safety Devices in place: Yes  Type of devices: Left in chair;Call light within reach;Nurse notified;Gait belt;Patient at risk for falls  Restraints  Initially in place: No           Patient Diagnosis(es): There were no encounter diagnoses.      has a past medical history of Allergic rhinitis, Asthma, CAD (coronary artery disease), Health care maintenance, Hypertension, Inguinal hernia, Irritant contact dermatitis due to plants, except food, and MI (myocardial infarction) ([facility] [address] has a past surgical history that includes Colonoscopy (2004); Cardiac catheterization (08/23/2021); and hernia repair. Restrictions  Restrictions/Precautions  Restrictions/Precautions: Cardiac, Fall Risk  Required Braces or Orthoses?: Yes  Required Braces or Orthoses  Other: Heart Hugger Brace  Position Activity Restriction  Sternal Precautions: No Pushing, No Pulling, 5# Lifting Restrictions  Other position/activity restrictions: Up in chair, ambulate pt, CABGx3 8/29    Subjective   General  Patient assessed for rehabilitation services?: Yes  Family / Caregiver Present: No  General Comment  Comments: RN ok'd for therapy, pt agreeable to session, pleasant/cooperative throughout  Patient Currently in Pain: Denies (chest discomfort only)    Social/Functional History  Social/Functional History  Lives With: Spouse  Type of Home: House  Home Layout: Two level, 1/2 bath on main level  Home Access: Stairs to enter without rails  Entrance Stairs - Number of Steps: 2  Bathroom Shower/Tub: Tub/Shower unit  Bathroom Toilet: Handicap height  Bathroom Equipment:  (no AD)  Bathroom Accessibility: Accessible  Home Equipment:  (No DME)  ADL Assistance: Independent  Homemaking Assistance: Independent  Homemaking Responsibilities: Yes (Shared with wife)  Ambulation Assistance: Independent (pt independently ambulates with no AD at baseline)  Transfer Assistance: Independent  Active : Yes  Mode of Transportation: Car  Occupation: Retired  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[address],Third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;  [employer]  Additional Comments: Wife is able to take off work for several days if pt returns home       Objective   Vision: Impaired  Vision Exceptions: Wears glasses at all times  Hearing: Within functional limits          Balance  Sitting Balance: Independent (Pt sat unsupported in recliner ~2 minutes)  Standing Balance: Contact guard assistance  Standing Balance  Time: ~15 minutes  Activity: Func mob and sinkside tasks  Comment: RW and CGA during func mob, SBA during func tasks d/t decreased endurance s/p CABG surgery  Functional Mobility  Functional - Mobility Device: Rolling Walker  Activity: To/from bathroom  Assist Level: Contact guard assistance  Functional Mobility Comments: RW and CGA- SBA during func mob for household and community distances. No LOB or noted SOB  ADL  Feeding: Modified independent ;Setup  Grooming: Modified independent ;Setup (Pt stood sinkside to complete oral hygiene, wash face, and comb hair with Mod I for set up. Pt noted swelling in B hands but did not affect ability to participate in tasks)  UE Bathing: Minimal assistance; Increased time to complete;Setup  LE Bathing: Minimal assistance; Increased time to complete;Setup  UE Dressing: Increased time to complete;Setup;Minimal assistance  LE Dressing: Setup;Modified independent  (Pt sat unsupported in recliner to doff/don socks using figure 4 method, would need Nils to don pants/underwear)  Toileting: Increased time to complete;Setup;Contact guard assistance  Tone RUE  RUE Tone: Normotonic  Tone LUE  LUE Tone: Normotonic  Coordination  Movements Are Fluid And Coordinated: Yes  Coordination and Movement description: Decreased speed;Right UE;Left UE (d/t B hand swelling, pt reporting hand movement \"feels sloppy\")     Bed mobility  Supine to Sit: Unable to assess  Sit to Supine: Unable to assess  Scooting: Supervision  Comment: Sup<>sit CHRISTINA as pt in recliner upon arrival and at end of session  Transfers  Sit to stand: Contact guard assistance  Stand to sit: Contact guard assistance  Transfer Comments: Pt educated on correct hand placement and use of heart hugger, CGA to ensure sucessful transfer and no instability     Cognition  Overall Cognitive Status: WFL        Sensation  Overall Sensation Status: WFL (pt denies numbness/tingling)        LUE AROM (degrees)  LUE AROM : WFL  Left Hand AROM (degrees)  Left Hand AROM: Exceptions  Left Hand General AROM: Minor limitations d/t swelling  RUE AROM (degrees)  RUE AROM : WFL  Right Hand AROM (degrees)  Right Hand AROM: Exceptions  Right Hand General AROM: Minor limitations d/t swelling  LUE Strength  Gross LUE Strength: Exceptions to [facility]  L Hand General: 5/5  LUE Strength Comment: UE NT d/t sternal precautions  RUE Strength  Gross RUE Strength: Exceptions to [facility]  R Hand General: 5/5  RUE Strength Comment: UE NT d/t sternal precautions              Plan   Plan  Times per week: 4-6x/wk (CABG)  Current Treatment Recommendations: Patient/Caregiver Education & Training, Home Management Training, Functional Mobility Training, Endurance Training, Safety Education & Training, Self-Care / ADL    AM-PAC Score        AM-PAC Inpatient Daily Activity Raw Score: 20 (08/31/21 0945)  AM-PAC Inpatient ADL T-Scale Score : 42.03 (08/31/21 0945)  ADL Inpatient CMS 0-100% Score: 38.32 (08/31/21 0945)  ADL Inpatient CMS G-Code Modifier : CJ (08/31/21 0945)    Goals  Short term goals  Time Frame for Short term goals: By discharge, pt will be able to:  Short term goal 1: Demo bed mob with Mod I  Short term goal 2: Demo func transfers, including EOB, toilet, and recliner, with Mod I and 0 VCs for correct hand placement  Short term goal 3: Demo func mob for household distances with Mod I and LRD PRN  Short term goal 4: Demo dynamic reaching/grasping in multiple planes during func activity with SUP and following all sternal precautions  Short term goal 5: Demo UB ADLs with Mod I and adaptive techniques PRN  Short term goal 6: Demo LB ADLs with SUP and AE/DME PRN  Short term goal 7:  Identify/demo all sternal precautions during func tasks with no more than 1 VC  Short term goal 8: Don heart hugger with Mod I and 0 VCs       Therapy Time   Individual Concurrent Group Co-treatment   Time In 0825         Time Out 0855         Minutes 30         Timed Code Treatment Minutes: 12 Minutes       [place] Joy[place] Jr, S/OT

## 2021-08-31 NOTE — PROGRESS NOTES
Physical Therapy    Facility/Department: [facility]  Initial Assessment    NAME: Kerry Gonzalez  : 1955  MRN: [id number]    Date of Service: 2021  S/p CABG x3 (21)     Discharge Recommendations:    No further therapy required at discharge. PT Equipment Recommendations  Equipment Needed: Yes  Mobility Devices: Yan [place]: Rolling  Other: At this time, pt would benefit from use of RW for safe ambulation    Assessment   Body structures, Functions, Activity limitations: Decreased functional mobility ; Decreased posture;Decreased endurance;Decreased balance  Assessment: Pt ambulated 300ft w/ RW SBA, no LOB noted throughout session. Pt would beneift from continued skilled acute skilled physical therapy to address endurance deficits and stair negotiation to return pt to prior level of independence. Prognosis: Good  Decision Making: Medium Complexity  PT Education: Goals;PT Role;Plan of Care  Patient Education: Sternal precautions; Educated on use of heart hugger with proper breathing technique  REQUIRES PT FOLLOW UP: Yes  Activity Tolerance  Activity Tolerance: Patient Tolerated treatment well       Patient Diagnosis(es): The encounter diagnosis was CAD in native artery. has a past medical history of Allergic rhinitis, Asthma, CAD (coronary artery disease), Health care maintenance, Hypertension, Inguinal hernia, Irritant contact dermatitis due to plants, except food, and MI (myocardial infarction) ([facility] [address] has a past surgical history that includes Colonoscopy (); Cardiac catheterization (2021); and hernia repair.     Restrictions  Restrictions/Precautions  Restrictions/Precautions: Cardiac, Fall Risk  Required Braces or Orthoses?: Yes  Required Braces or Orthoses  Other: Heart Hugger Brace  Position Activity Restriction  Sternal Precautions: No Pushing, No Pulling, 5# Lifting Restrictions  Other position/activity restrictions: Up in chair, ambulate pt, up for meals;  CABGx3 (8/30/21)  Vision/Hearing  Vision: Impaired  Vision Exceptions: Wears glasses at all times  Hearing: Within functional limits     Subjective  General  Patient assessed for rehabilitation services?: Yes  Response To Previous Treatment: Not applicable  Family / Caregiver Present: No  Follows Commands: Within Functional Limits  Subjective  Subjective: RN and pt in agreement for PT eval; pt seated in bedside chair upon PT arrival, pt pleasant and cooperative throughout session  Pain Screening  Patient Currently in Pain: Denies  Vital Signs  Patient Currently in Pain: Denies       Orientation  Orientation  Overall Orientation Status: Within Functional Limits  Social/Functional History  Social/Functional History  Lives With: Spouse  Type of Home: House  Home Layout: Two level, 1/2 bath on main level  Home Access: Stairs to enter without rails  Entrance Stairs - Number of Steps: 2  Bathroom Shower/Tub: Tub/Shower unit  Bathroom Toilet: Handicap height  Bathroom Equipment:  (no AD)  Bathroom Accessibility: Accessible  Home Equipment:  (No DME)  ADL Assistance: Independent  Homemaking Assistance: Independent  Homemaking Responsibilities: Yes (Shared with wife)  Ambulation Assistance: Independent (pt independently ambulates with no AD at baseline)  Transfer Assistance: Independent  Active : Yes  Mode of Transportation: Car  Occupation: Retired  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[address],[address]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;  [employer]  Additional Comments: Wife is able to take off work for several days if pt returns home  Cognition   Cognition  Overall Cognitive Status: WFL    Objective  Joint Mobility  Spine: WFL  ROM RLE: WFL  ROM LLE: WFL  ROM RUE: WFL  ROM LUE: WFL  Strength RLE  Strength RLE: WFL  Strength LLE  Strength LLE: WFL  Strength RUE  Strength RUE: Exception  Comment: Not formally assessed due to sternal precautions- AROM against gravity noted at shoulder, elbow, wrist/ hand  Strength LUE  Strength LUE: Exception  Comment: Not formally assessed due to sternal precautions- AROM against gravity noted at shoulder, elbow, wrist/ hand     Sensation  Overall Sensation Status: WFL (pt denies numbness/tingling)  Bed mobility  Comment: Not formally assessed- pt seated in bedside chair upon writer's arrival, retired in bedside chair upon writer's exit  Transfers  Sit to Stand: Stand by assistance  Stand to sit: Stand by assistance  Comment: Pt educated on use of heart hugger with proper breathing technique with good return demo  Ambulation  Ambulation?: Yes  Ambulation 1  Surface: level tile  Device: Rolling Walker  Assistance: Stand by assistance  Gait Deviations: Slow [place]  Distance: 300ft  Comments: No LOB noted throughout, pt reports mild RLE discomfort with ambulation  Stairs/Curb  Stairs?: No     Balance  Posture: Good  Sitting - Static: Good  Sitting - Dynamic: Good  Standing - Static: Good  Standing - Dynamic: Good;-  Comments: Standing balance assessed with RW        Plan   Plan  Times per week: 6-7x/week; 1-2/day  Current Treatment Recommendations: Strengthening, Transfer Training, Endurance Training, Patient/Caregiver Education & Training, Gait Training, Balance Training, Stair training, Functional Mobility Training  Safety Devices  Type of devices: Left in chair, Call light within reach, Gait belt, Nurse notified  Restraints  Initially in place: No  AM-PAC Score  AM-PAC Inpatient Mobility Raw Score : 21 (08/31/21 1108)  AM-PAC Inpatient T-Scale Score : 50.25 (08/31/21 1108)  Mobility Inpatient CMS 0-100% Score: 28.97 (08/31/21 1108)  Mobility Inpatient CMS G-Code Modifier : Heidi Ryan (08/31/21 1108)          Goals  Short term goals  Time Frame for Short term goals: 10  Short term goal 1: Pt to perform bed mobility and functional transfer independently  Short term goal 2: Ambulate 600ft with no AD independently  Short term goal 3: Ascend/descend 9 stairs with one rail to simulate home environment to

## 2021-08-31 NOTE — CARE COORDINATION
Case Management Initial Discharge Plan  Machelle Prime,             Met with:patient to discuss discharge plans. Information verified: address, contacts, phone number, , insurance Yes  Insurance Provider: [address] [place], [employer]    Emergency Contact/Next of Justine Ch name & number: Venice Kessler (wife)   Who are involved in patient's support system? wife    PCP: Adolfo Tello, APRN - CNP  Date of last visit: beginning of the year      Discharge Planning    Living Arrangements:        Home has 2 stories  2 stairs to climb to get into front door, stairs to climb to reach second floor  Location of bedroom/bathroom in home second    Patient able to perform ADL's:Independent    Current Services (outpatient & in home) none  DME equipment: none  DME provider: none    Is patient receiving oral anticoagulation therapy? No    If indicated:   Physician managing anticoagulation treatment:   Where does patient obtain lab work for ATC treatment? Potential Assistance Needed:       Patient agreeable to home care: No  [place] of choice provided:  no    Prior SNF/Rehab Placement and Facility: no  Agreeable to SNF/Rehab: No  [place] of choice provided: no     Evaluation: no    Expected Discharge date:       Patient expects to be discharged to: If home: is the family and/or caregiver wiling & able to provide support at home? wife  Who will be providing this support? Wife & daughter    Follow Up Appointment: Best Day/ Time:      Transportation provider: family  Transportation arrangements needed for discharge: No    Readmission Risk              Risk of Unplanned Readmission:  11             Does patient have a readmission risk score greater than 14?: No  If yes, follow-up appointment must be made within 7 days of discharge.      Goals of Care:       Educated patient on transitional options, provided freedom of choice and are agreeable with plan      Discharge Plan: home with wife who is a RN, declining home care.   Cardiac rehab referral to [facility] [place]          Electronically signed by Susie Chicas RN on 8/31/21 at 10:14 AM EDT

## 2021-08-31 NOTE — PLAN OF CARE
Problem: Pain:  Description: Pain management should include both nonpharmacologic and pharmacologic interventions. Goal: Pain level will decrease  Description: Pain level will decrease  8/31/2021 1131 by Sharri Brown RN  Outcome: Ongoing  8/30/2021 2317 by Adolph Villarreal RN  Outcome: Ongoing  Goal: Control of acute pain  Description: Control of acute pain  8/31/2021 1131 by Sharri Brown RN  Outcome: Ongoing  8/30/2021 2317 by Adolph Villarreal RN  Outcome: Ongoing  Goal: Control of chronic pain  Description: Control of chronic pain  8/31/2021 1131 by Sharri Brown RN  Outcome: Ongoing  8/30/2021 2317 by Adolph Villarreal RN  Outcome: Ongoing     Problem: Bleeding:  Goal: Will show no signs and symptoms of excessive bleeding  Description: Will show no signs and symptoms of excessive bleeding  8/31/2021 1131 by Shrari Brown RN  Outcome: Ongoing  8/30/2021 2317 by Adolph Villarreal RN  Outcome: Ongoing        Problem: Falls - Risk of:  Goal: Will remain free from falls  Description: Will remain free from falls  8/31/2021 1131 by Sharri Brown RN  Outcome: Ongoing  8/30/2021 2317 by Adolph Villarreal RN  Outcome: Ongoing  Note: Pt wearing non skid slippers, call light within reach, bed in lowest position and wheel locked, pt knows to call prior to getting up, immediate area free of cords that may cause a fall hazard.    Goal: Absence of physical injury  Description: Absence of physical injury  8/31/2021 1131 by Sharri Brown RN  Outcome: Ongoing  8/30/2021 2317 by Adolph Villarreal RN  Outcome: Ongoing

## 2021-08-31 NOTE — PLAN OF CARE
Problem: OXYGENATION/RESPIRATORY FUNCTION  Goal: Patient will maintain patent airway  8/31/2021 0415 by Talha Paredes RCP  Outcome: Completed     Problem: OXYGENATION/RESPIRATORY FUNCTION  Goal: Patient will achieve/maintain normal respiratory rate/effort  Description: Respiratory rate and effort will be within normal limits for the patient  8/31/2021 0415 by Talha Paredes RCP  Outcome: Completed     Problem: MECHANICAL VENTILATION  Goal: Patient will maintain patent airway  8/31/2021 0415 by Talha Paredes RCP  Outcome: Completed     Problem: MECHANICAL VENTILATION  Goal: Oral health is maintained or improved  8/31/2021 0415 by Talha Paredes RCP  Outcome: Completed     Problem: MECHANICAL VENTILATION  Goal: ET tube will be managed safely  8/31/2021 0415 by Talha Paredes RCP  Outcome: Completed     Problem: MECHANICAL VENTILATION  Goal: Ability to express needs and understand communication  8/31/2021 0415 by Talha Paredes RCP  Outcome: Completed     Problem: MECHANICAL VENTILATION  Goal: Mobility/activity is maintained at optimum level for patient  8/31/2021 0415 by Talha Paredes RCP  Outcome: Completed     Problem: Respiratory  Intervention: Respiratory assessment  Note:   PROVIDE ADEQUATE OXYGENATION WITH ACCEPTABLE SP02/ABG'S    [x]  IDENTIFY APPROPRIATE OXYGEN THERAPY  [x]   MONITOR SP02/ABG'S AS NEEDED   [x]   PATIENT EDUCATION AS NEEDED   BRONCHOSPASM/BRONCHOCONSTRICTION     [x]         IMPROVE AERATION/BREATH SOUNDS  [x]   ADMINISTER BRONCHODILATOR THERAPY AS APPROPRIATE  [x]   ASSESS BREATH SOUNDS  []   IMPLEMENT AEROSOL/MDI PROTOCOL  [x]   PATIENT EDUCATION AS NEEDED

## 2021-08-31 NOTE — FLOWSHEET NOTE
Assessment: Patient is a 77 yo. male who underwent \"open heart surgery/CABG x3.\" Patient was sitting up in recliner beside hospital bed, when  visited. Intervention:  visited patient per initial rounding visits and per surgery list. Patient was sleeping when  made initial attempt. Per bedside nurse, patient's surgery \"went as planned. \" Patient was \"already extubated and sleeping,\" at time of 's initial visit.  made follow-up visit in the morning and met with patient in room. Patient shared that he is \"feeling okay,\" and is determined to be \"discharged home in three days. \" Per report, patient has \"already walked around the nurses station this morning. \" Alva Hernández learned that patient's spouse and daughter are both hospital employees. Patient was coping well and thanked  for visit. Outcome: Patient was receptive of 's visit and support. Plan: Chaplains can make follow-up visit, per request. Lavell Velez can be reached 24/7 via [employer]. Narendra Domínguez     08/31/21 0704   Encounter Summary   Services provided to: Patient   Referral/Consult From: Rounding;Multi-disciplinary team   Support System Spouse; Children;Family members   Place of [employer]   Continue Visiting   (8/30/2021)   Complexity of Encounter Low   Length of Encounter 15 minutes   Spiritual Assessment Completed Yes   Routine   Type Post-procedure   Spiritual/Roman Catholic   Type Spiritual support   Assessment Approachable;Coping; Hopeful  (Determined)   Intervention Active listening;Sustaining presence/ Ministry of presence; Discussed illness/injury and it's impact   Outcome Engaged in conversation;Coping

## 2021-08-31 NOTE — PROGRESS NOTES
Pt has been unable to urinate post mon removal. Bladder scan completed and 250 noted to be dwelling in bladder. Pt up and in bathroom at this time in an attempt to void. I/O monitoring continues    1418- pt verbalized having the ability to urinate. Unable to measure and pt voices minimal output. Urinal at pt side. Oral intake remains unchanged, adequate water intake noted. Poor appetite remains. Pt educated to proper nutritional intake to promote and assist in the healing process. 1500- Cardiology fellow Dr. Almond Siemens made secondary round to check in on patient. Plans to continue to hold Metoprolol and reevaluate for continuation tomorrow 9/1/21.    1630-Pt still unable to empty bladder, bladder scan revealed 368 indwelling. Pt in bathroom at this time attempting to void. Pt verbalized minimal voiding for second attempt.  Post void residual 300

## 2021-08-31 NOTE — PLAN OF CARE
Problem: Pain:  Goal: Pain level will decrease  Description: Pain level will decrease  8/30/2021 2317 by Jay Jay Mcgee RN  Outcome: Ongoing  8/30/2021 1358 by Isrrael Bosch RCP  Outcome: Ongoing  Goal: Control of acute pain  Description: Control of acute pain  8/30/2021 2317 by Jay Jay Mcgee RN  Outcome: Ongoing  8/30/2021 1358 by Isrrael Bosch RCP  Outcome: Ongoing  Goal: Control of chronic pain  Description: Control of chronic pain  8/30/2021 2317 by Jay Jay Mcgee RN  Outcome: Ongoing  8/30/2021 1358 by Isrrael Bosch RCP  Outcome: Ongoing     Problem: OXYGENATION/RESPIRATORY FUNCTION  Goal: Patient will maintain patent airway  8/30/2021 2317 by Jay Jay Mcgee RN  Outcome: Ongoing  8/30/2021 1358 by Isrrael Bosch RCP  Outcome: Ongoing  Goal: Patient will achieve/maintain normal respiratory rate/effort  Description: Respiratory rate and effort will be within normal limits for the patient  8/30/2021 2317 by Jay Jay Mcgee RN  Outcome: Ongoing  8/30/2021 1358 by Isrrael Bosch RCP  Outcome: Ongoing     Problem: MECHANICAL VENTILATION  Goal: Patient will maintain patent airway  8/30/2021 2317 by Jay Jay Mcgee RN  Outcome: Ongoing  8/30/2021 1358 by Isrrael Boshc RCP  Outcome: Ongoing  Goal: Oral health is maintained or improved  8/30/2021 2317 by Jay Jay Mcgee RN  Outcome: Ongoing  8/30/2021 1358 by Isrrael Bosch RCP  Outcome: Ongoing  Goal: ET tube will be managed safely  8/30/2021 2317 by Jay Jay Mcgee RN  Outcome: Ongoing  8/30/2021 1358 by Isrrael Bosch RCP  Outcome: Ongoing  Goal: Ability to express needs and understand communication  8/30/2021 2317 by Jay Jay Mcgee RN  Outcome: Ongoing  8/30/2021 1358 by Isrrael Bosch RCP  Outcome: Ongoing  Goal: Mobility/activity is maintained at optimum level for patient  8/30/2021 2317 by Jay Jay Mcgee RN  Outcome: Ongoing  8/30/2021 1358 by Isrrael Bosch RCP  Outcome: Ongoing     Problem: SKIN INTEGRITY  Goal: Skin integrity is maintained or improved  8/30/2021 2317 by Karyn Corbett RN  Outcome: Ongoing  8/30/2021 1358 by Ramandeep Neves RCP  Outcome: Ongoing     Problem: Falls - Risk of:  Goal: Will remain free from falls  Description: Will remain free from falls  Outcome: Ongoing  Note: Pt wearing non skid slippers, call light within reach, bed in lowest position and wheel locked, pt knows to call prior to getting up, immediate area free of cords that may cause a fall hazard.    Goal: Absence of physical injury  Description: Absence of physical injury  Outcome: Ongoing     Problem: Bleeding:  Goal: Will show no signs and symptoms of excessive bleeding  Description: Will show no signs and symptoms of excessive bleeding  Outcome: Ongoing

## 2021-08-31 NOTE — OP NOTE
[address]                  [place] [address]                                OPERATIVE REPORT    PATIENT NAME: Sloan Fregoso                      :        1955  MED REC NO:   [id number]                             ROOM:       [facility]  ACCOUNT NO:   [de-identified]                           ADMIT DATE: 2021  PROVIDER:     Tony Gooden MD    DATE OF PROCEDURE:  2021    PRIMARY ATTENDING SURGEON:  Tony Gooden MD    OTHER ASSISTANTS:  Include Karey Stein PA-C and Floridalma Cunha RN, RFA. PREOPERATIVE DIAGNOSIS:  Multivessel coronary artery disease. POSTOPERATIVE DIAGNOSIS:  Multivessel coronary artery disease. PROCEDURES PERFORMED:  Median sternotomy, aorto-right atrial  cardiopulmonary bypass, endoscopic radial harvest, endoscopic vein  harvest, CABG x3 with skeletonized LIMA to LAD, reverse saphenous vein  graft 1 to D2, and reverse saphenous vein graft 2 to inferior branch of  ramus. COMPLICATIONS:  None. CONDITION:  Stable. DISPOSITION:  To CVICU. ESTIMATED BLOOD LOSS:  Not applicable. ANESTHESIA:  General endotracheal with Dr. Tyson Honeycutt. INDICATIONS FOR SURGERY:  The patient is a 14-year-old man who was  referred to me for coronary bypass surgery and was found to be an  acceptable candidate. He was thus taken to surgery with the consent of  he and his family on 2021. FINDINGS AT SURGERY:  There was good quality endoscopic vein harvest.   The endoscopically obtained left radial artery has an aneurysmal mid  segment portion which unfortunately rendered it useless. I achieved  CABG x3 to the target as described above. There were palpable pulses in  all the grafts with no EKG or echo evidence of any ischemia at the  conclusion of the case.     SURGERY IN DETAIL:  The patient was identified in his bed in the CVICU  and was transported to the operating room where he was induced general  endotracheal anesthesia without difficulty. This included an uneventful  endotracheal intubation, the appropriate placement of lines and access  for cardiac surgery. He was prepped and draped in normal sterile  fashion. A time-out was performed and documented. The operation began  with the simultaneous performance of an endoscopic left radial artery  harvest as well as an endoscopic right saphenous vein harvest.  Once all  the conduit was taken and deemed appropriate, then a median sternotomy  was performed and a skeletonized LIMA was taken down from the chest  wall. Once all the conduit was deemed appropriate, then a pericardial  well was created and a preliminary dissection was performed in order to  achieve aorto-right atrial cardiopulmonary bypass which was achieved  with excellent flows and drainage after appropriate heparinization. An  ascending aorta cardioplegia needle was placed in the ascending aorta. A cross-clamp was applied and a dose of cold [place] solution was given  to achieve an adequate diastolic arrest.  I looked about the heart and  the above-mentioned findings were found. I thus conducted the operation  by first performing the distal anastomosis of the reverse saphenous vein  graft to the inferior branch of the ramus and then the diagonal.  I then  performed the LIMA to LAD anastomosis. I lastly connected the proximal  portion of the reverse saphenous vein graft to the takeoff of the  ascending aorta. The heart was de-aired, the cross-clamp was removed,  and there was eventual return of normal sinus rhythm. The ventricular  wire was placed. I was able to wean the patient from cardiopulmonary  bypass with good hemodynamics on minimal ionotropic support. Satisfied  with this, I thus decannulated and administered protamine. Hemostasis  was achieved and verified. The sternum was ultimately closed by double  stainless steel wires and chest tubes were placed.   The

## 2021-08-31 NOTE — PROGRESS NOTES
(08/31/21 1185)    dextrose      EPINEPHrine      propofol Stopped (08/30/21 1613)     PRN Meds:magnesium hydroxide, aluminum & magnesium hydroxide-simethicone, potassium chloride **OR** potassium alternative oral replacement **OR** potassium chloride, sodium chloride flush, sodium chloride, ondansetron, acetaminophen, oxyCODONE-acetaminophen **OR** oxyCODONE-acetaminophen, fentanNYL **OR** fentanNYL, hydrALAZINE, metoprolol, sodium bicarbonate, diphenhydrAMINE, bisacodyl, fleet, calcium chloride IVPB, magnesium sulfate, potassium chloride, albumin human, norepinephrine, glucose, dextrose, glucagon (rDNA), dextrose, EPINEPHrine    Beta- Blocker: Yes  Aspirin: Yes  Lovenox: No  GI: Yes  Plavix: Yes  Coumadin: No  Statin: Yes  ACE: No    Assessment/ Plan:  Remove mon and art line  Pt doing well. POD 1-fast track pt  Please continue to ambulate patient TID  Keep cts to suction. eval to remove tomorrow  Needs to have BM-started mylaanta for gas  IS and acapella use 20x/hr  DC planning-home with [facility]          On this date 8/25/2021 I have spent 20 minutes reviewing previous notes, test results and face to face with the patient discussing the diagnosis and importance of compliance with the treatment plan as well as documenting on the day of the visit.   ISI Blank - NP

## 2021-09-01 ENCOUNTER — APPOINTMENT (OUTPATIENT)
Dept: GENERAL RADIOLOGY | Age: 66
DRG: 236 | End: 2021-09-01
Attending: THORACIC SURGERY (CARDIOTHORACIC VASCULAR SURGERY)
Payer: COMMERCIAL

## 2021-09-01 LAB
ANION GAP SERPL CALCULATED.3IONS-SCNC: 12 MMOL/L (ref 9–17)
BUN BLDV-MCNC: 14 MG/DL (ref 8–23)
BUN/CREAT BLD: ABNORMAL (ref 9–20)
CALCIUM SERPL-MCNC: 8.5 MG/DL (ref 8.6–10.4)
CHLORIDE BLD-SCNC: 100 MMOL/L (ref 98–107)
CO2: 22 MMOL/L (ref 20–31)
CREAT SERPL-MCNC: 0.73 MG/DL (ref 0.7–1.2)
GFR AFRICAN AMERICAN: >60 ML/MIN
GFR NON-AFRICAN AMERICAN: >60 ML/MIN
GFR SERPL CREATININE-BSD FRML MDRD: ABNORMAL ML/MIN/{1.73_M2}
GFR SERPL CREATININE-BSD FRML MDRD: ABNORMAL ML/MIN/{1.73_M2}
GLUCOSE BLD-MCNC: 104 MG/DL (ref 75–110)
GLUCOSE BLD-MCNC: 127 MG/DL (ref 75–110)
GLUCOSE BLD-MCNC: 131 MG/DL (ref 75–110)
GLUCOSE BLD-MCNC: 136 MG/DL (ref 70–99)
GLUCOSE BLD-MCNC: 152 MG/DL (ref 75–110)
GLUCOSE BLD-MCNC: 97 MG/DL (ref 75–110)
HCT VFR BLD CALC: 32.3 % (ref 40.7–50.3)
HEMOGLOBIN: 10.3 G/DL (ref 13–17)
INR BLD: 1
MAGNESIUM: 2.2 MG/DL (ref 1.6–2.6)
MCH RBC QN AUTO: 32.3 PG (ref 25.2–33.5)
MCHC RBC AUTO-ENTMCNC: 31.9 G/DL (ref 28.4–34.8)
MCV RBC AUTO: 101.3 FL (ref 82.6–102.9)
NRBC AUTOMATED: 0 PER 100 WBC
PDW BLD-RTO: 12 % (ref 11.8–14.4)
PLATELET # BLD: ABNORMAL K/UL (ref 138–453)
PLATELET, FLUORESCENCE: 116 K/UL (ref 138–453)
PLATELET, IMMATURE FRACTION: 4.9 % (ref 1.1–10.3)
PMV BLD AUTO: ABNORMAL FL (ref 8.1–13.5)
POTASSIUM SERPL-SCNC: 4.1 MMOL/L (ref 3.7–5.3)
PROTHROMBIN TIME: 11.1 SEC (ref 9.1–12.3)
RBC # BLD: 3.19 M/UL (ref 4.21–5.77)
SODIUM BLD-SCNC: 134 MMOL/L (ref 135–144)
WBC # BLD: 11.1 K/UL (ref 3.5–11.3)

## 2021-09-01 PROCEDURE — 6370000000 HC RX 637 (ALT 250 FOR IP): Performed by: NURSE PRACTITIONER

## 2021-09-01 PROCEDURE — 85055 RETICULATED PLATELET ASSAY: CPT

## 2021-09-01 PROCEDURE — 2580000003 HC RX 258: Performed by: NURSE PRACTITIONER

## 2021-09-01 PROCEDURE — 51701 INSERT BLADDER CATHETER: CPT

## 2021-09-01 PROCEDURE — 97116 GAIT TRAINING THERAPY: CPT

## 2021-09-01 PROCEDURE — 94761 N-INVAS EAR/PLS OXIMETRY MLT: CPT

## 2021-09-01 PROCEDURE — 6360000002 HC RX W HCPCS: Performed by: NURSE PRACTITIONER

## 2021-09-01 PROCEDURE — 85610 PROTHROMBIN TIME: CPT

## 2021-09-01 PROCEDURE — 82947 ASSAY GLUCOSE BLOOD QUANT: CPT

## 2021-09-01 PROCEDURE — 83735 ASSAY OF MAGNESIUM: CPT

## 2021-09-01 PROCEDURE — 80048 BASIC METABOLIC PNL TOTAL CA: CPT

## 2021-09-01 PROCEDURE — 2100000001 HC CVICU R&B

## 2021-09-01 PROCEDURE — 36415 COLL VENOUS BLD VENIPUNCTURE: CPT

## 2021-09-01 PROCEDURE — 71045 X-RAY EXAM CHEST 1 VIEW: CPT

## 2021-09-01 PROCEDURE — 94640 AIRWAY INHALATION TREATMENT: CPT

## 2021-09-01 PROCEDURE — 97535 SELF CARE MNGMENT TRAINING: CPT

## 2021-09-01 PROCEDURE — 97110 THERAPEUTIC EXERCISES: CPT

## 2021-09-01 PROCEDURE — 85027 COMPLETE CBC AUTOMATED: CPT

## 2021-09-01 PROCEDURE — 99024 POSTOP FOLLOW-UP VISIT: CPT | Performed by: NURSE PRACTITIONER

## 2021-09-01 PROCEDURE — 51798 US URINE CAPACITY MEASURE: CPT

## 2021-09-01 RX ORDER — FUROSEMIDE 10 MG/ML
20 INJECTION INTRAMUSCULAR; INTRAVENOUS 2 TIMES DAILY
Status: DISCONTINUED | OUTPATIENT
Start: 2021-09-01 | End: 2021-09-02 | Stop reason: HOSPADM

## 2021-09-01 RX ORDER — FUROSEMIDE 10 MG/ML
INJECTION INTRAMUSCULAR; INTRAVENOUS
Status: DISPENSED
Start: 2021-09-01 | End: 2021-09-01

## 2021-09-01 RX ORDER — METOCLOPRAMIDE 10 MG/1
10 TABLET ORAL
Status: DISCONTINUED | OUTPATIENT
Start: 2021-09-01 | End: 2021-09-02 | Stop reason: HOSPADM

## 2021-09-01 RX ADMIN — INSULIN GLARGINE 11 UNITS: 100 INJECTION, SOLUTION SUBCUTANEOUS at 20:14

## 2021-09-01 RX ADMIN — INSULIN LISPRO 1 UNITS: 100 INJECTION, SOLUTION INTRAVENOUS; SUBCUTANEOUS at 20:04

## 2021-09-01 RX ADMIN — FUROSEMIDE 20 MG: 10 INJECTION, SOLUTION INTRAMUSCULAR; INTRAVENOUS at 18:50

## 2021-09-01 RX ADMIN — CLOPIDOGREL 75 MG: 75 TABLET, FILM COATED ORAL at 08:17

## 2021-09-01 RX ADMIN — IPRATROPIUM BROMIDE AND ALBUTEROL SULFATE 1 AMPULE: .5; 3 SOLUTION RESPIRATORY (INHALATION) at 20:38

## 2021-09-01 RX ADMIN — ASPIRIN 81 MG: 81 TABLET, COATED ORAL at 08:17

## 2021-09-01 RX ADMIN — METOCLOPRAMIDE 10 MG: 10 TABLET ORAL at 10:46

## 2021-09-01 RX ADMIN — OXYCODONE HYDROCHLORIDE AND ACETAMINOPHEN 1 TABLET: 5; 325 TABLET ORAL at 05:24

## 2021-09-01 RX ADMIN — MUPIROCIN: 20 OINTMENT TOPICAL at 08:22

## 2021-09-01 RX ADMIN — IPRATROPIUM BROMIDE AND ALBUTEROL SULFATE 1 AMPULE: .5; 3 SOLUTION RESPIRATORY (INHALATION) at 12:09

## 2021-09-01 RX ADMIN — IPRATROPIUM BROMIDE AND ALBUTEROL SULFATE 1 AMPULE: .5; 3 SOLUTION RESPIRATORY (INHALATION) at 15:54

## 2021-09-01 RX ADMIN — AMIODARONE HYDROCHLORIDE 200 MG: 200 TABLET ORAL at 14:10

## 2021-09-01 RX ADMIN — MUPIROCIN: 20 OINTMENT TOPICAL at 20:17

## 2021-09-01 RX ADMIN — AMIODARONE HYDROCHLORIDE 200 MG: 200 TABLET ORAL at 08:17

## 2021-09-01 RX ADMIN — IPRATROPIUM BROMIDE AND ALBUTEROL SULFATE 1 AMPULE: .5; 3 SOLUTION RESPIRATORY (INHALATION) at 20:35

## 2021-09-01 RX ADMIN — TAMSULOSIN HYDROCHLORIDE 0.4 MG: 0.4 CAPSULE ORAL at 08:17

## 2021-09-01 RX ADMIN — SODIUM CHLORIDE, PRESERVATIVE FREE 10 ML: 5 INJECTION INTRAVENOUS at 08:17

## 2021-09-01 RX ADMIN — METOCLOPRAMIDE 10 MG: 10 TABLET ORAL at 16:34

## 2021-09-01 RX ADMIN — POTASSIUM CHLORIDE 20 MEQ: 1500 TABLET, EXTENDED RELEASE ORAL at 05:23

## 2021-09-01 RX ADMIN — IPRATROPIUM BROMIDE AND ALBUTEROL SULFATE 1 AMPULE: .5; 3 SOLUTION RESPIRATORY (INHALATION) at 08:31

## 2021-09-01 RX ADMIN — ATORVASTATIN CALCIUM 80 MG: 80 TABLET, FILM COATED ORAL at 20:17

## 2021-09-01 RX ADMIN — PANTOPRAZOLE SODIUM 40 MG: 40 TABLET, DELAYED RELEASE ORAL at 08:17

## 2021-09-01 RX ADMIN — AMIODARONE HYDROCHLORIDE 200 MG: 200 TABLET ORAL at 20:17

## 2021-09-01 RX ADMIN — POLYETHYLENE GLYCOL 3350 17 G: 17 POWDER, FOR SOLUTION ORAL at 08:17

## 2021-09-01 RX ADMIN — FUROSEMIDE 20 MG: 10 INJECTION, SOLUTION INTRAMUSCULAR; INTRAVENOUS at 08:19

## 2021-09-01 RX ADMIN — SODIUM CHLORIDE, PRESERVATIVE FREE 10 ML: 5 INJECTION INTRAVENOUS at 20:17

## 2021-09-01 RX ADMIN — BISACODYL 5 MG: 5 TABLET, COATED ORAL at 08:17

## 2021-09-01 ASSESSMENT — PAIN DESCRIPTION - DESCRIPTORS: DESCRIPTORS: DISCOMFORT

## 2021-09-01 ASSESSMENT — PAIN SCALES - GENERAL
PAINLEVEL_OUTOF10: 3

## 2021-09-01 ASSESSMENT — PAIN DESCRIPTION - LOCATION: LOCATION: CHEST;INCISION

## 2021-09-01 ASSESSMENT — PAIN DESCRIPTION - ORIENTATION: ORIENTATION: MID

## 2021-09-01 ASSESSMENT — PAIN DESCRIPTION - PAIN TYPE: TYPE: SURGICAL PAIN

## 2021-09-01 NOTE — PROGRESS NOTES
[facility] Cardiothoracic Surgery  Progress Note    9/1/2021 7:15 AM  Surgeon: Jesi Martinez MD  POD #2  S/P: Cabg X3-EVH     Subjective:  Mr. Trey Barnes   Doing well. Up walking. no CP or SOB. No passing gas yet  Objective:  BP 96/62   Pulse 76   Temp 98.8 °F (37.1 °C) (Oral)   Resp 18   Ht 5' 10.5\" (1.791 m)   Wt 175 lb 11.3 oz (79.7 kg)   SpO2 93%   BMI 24.85 kg/m²   Chest: pacing wires: yes, chest tubes:yes, air leak no, 2 +  CV: no murmur noted, Normal S1, S2, NSR  Lungs: clear to auscultation, no wheezes, rales, or rhonchi  Abd: hypoactive tones. No pain with abdomen palpation in all 4 quads. No guarding present. No bloating noted  Lower Extremities: Trace edema  CXR-normal postop CXR. Diffuse pulm edema. Small right apical pneumo noted. Gas in stomach noted to be unchanged. Labs:   CBC:   Recent Labs     08/30/21  1403 08/30/21  1403 08/30/21 2147 08/31/21  0409 09/01/21  0420   WBC 20.6*  --   --  11.8* 11.1   HGB 12.2*   < > 10.0* 10.1* 10.3*   HCT 35.7*   < > 29.8* 30.1* 32.3*   MCV 94.9  --   --  97.4 101.3     --   --  See Reflexed IPF Result See Reflexed IPF Result    < > = values in this interval not displayed. BMP:   Recent Labs     08/30/21  1403 08/30/21  1403 08/30/21 2147 08/31/21  0409 09/01/21  0420     --   --  140 134*   K 4.7   < > 4.6 4.1 4.1     --   --  109* 100   CO2 14*  --   --  23 22   BUN 16  --   --  14 14   CREATININE 0.78  --   --  0.68* 0.73    < > = values in this interval not displayed. I/O: I/O last 3 completed shifts: In: 962 [P.O.:720; I.V.:12; IV Piggyback:200]  Out: 7663 [Urine:1252;  Chest Tube:445]  Scheduled Meds:   furosemide  20 mg IntraVENous BID    metoclopramide  10 mg Oral TID AC    insulin lispro  0-3 Units SubCUTAneous Nightly    insulin lispro  0-6 Units SubCUTAneous TID WC    tamsulosin  0.4 mg Oral Daily    sodium chloride flush  10 mL IntraVENous 2 times per day    aspirin  81 mg Oral Daily    clopidogrel  75 mg Oral Daily diagnosis and importance of compliance with the treatment plan as well as documenting on the day of the visit.   ISI Blank - NP

## 2021-09-01 NOTE — PROGRESS NOTES
Occupational Therapy  Facility/Department: [facility] 1  Daily Treatment Note  NAME: Kodi Davenport  : 1955  MRN: [id number]    Date of Service: 2021     CABG CORONARY ARTERY BYPASS X3,     Discharge Recommendations:  Patient would benefit from continued therapy after discharge. Pt is expected to be safe to return to PLOF with assist in order to safely engage in ADLs, IADLs, and func mob. Assessment   Performance deficits / Impairments: Decreased functional mobility ; Decreased endurance;Decreased ADL status; Decreased high-level IADLs  Prognosis: Good  Decision Making: Medium Complexity  OT Education: OT Role;Plan of Care;Equipment;Precautions; ADL Adaptive Strategies;Transfer Training  Patient Education: Pt able to recall 2/3 sternal precautions, good use of heart hugger, demo'd use of incentive spirometer, educated on POC- good return  Barriers to Learning: none  REQUIRES OT FOLLOW UP: Yes  Activity Tolerance  Activity Tolerance: Patient Tolerated treatment well  Safety Devices  Safety Devices in place: Yes  Type of devices: Left in chair;Call light within reach;Nurse notified;Gait belt;Patient at risk for falls         Patient Diagnosis(es): The encounter diagnosis was CAD in native artery. has a past medical history of Allergic rhinitis, Asthma, CAD (coronary artery disease), Health care maintenance, Hypertension, Inguinal hernia, Irritant contact dermatitis due to plants, except food, and MI (myocardial infarction) ([facility] [address] has a past surgical history that includes Colonoscopy (); Cardiac catheterization (2021); hernia repair; and Coronary artery bypass graft (N/A, 2021).     Restrictions  Restrictions/Precautions  Restrictions/Precautions: Cardiac, Fall Risk  Required Braces or Orthoses?: Yes  Required Braces or Orthoses  Other: Heart Hugger Brace  Position Activity Restriction  Sternal Precautions: No Pushing, No Pulling, 5# Lifting Restrictions  Other position/activity restrictions: Up in chair, ambulate pt, up for meals;  CABGx3 (8/29/21)  Subjective   General  Chart Reviewed: Yes  Patient assessed for rehabilitation services?: Yes  Family / Caregiver Present: Yes (wife present at beginning of session, left soon after writer's arrival)  General Comment  Comments: RN ok'd for therapy, pt agreeable to session, pleasant/cooperative throughout  Vital Signs  Patient Currently in Pain: Denies   Orientation     Objective    ADL  Grooming: Modified independent ;Setup (Pt stood sinkside to complete oral hygiene, wet hair and comb, and shave using electric razor)  UE Dressing: Increased time to complete;Setup;Minimal assistance (While seated unsupported in recliner, pt able untie gown and unthread arms. Pt able to thread new gown on arms, require assist to tie in back. Pt educated on doffing/donning heart hugger, required Nils for orientation when donning.)  Additional Comments: Pt demo'd dynamic reaching for linens in cabinets, pt removed pad and pillows from recliner and replaced with new pad, required dynamic reaching/grasping.  Completed with SBA, no LOB; good use of heart hugger throughout        Balance  Sitting Balance: Independent (Pt sat unsupported in recliner ~10 minutes during func tasks and prior to func mob)  Standing Balance: Stand by assistance  Standing Balance  Time: ~10 minutes  Activity: Func mob and sinkside tasks  Comment: No AD and SBA during func mob, SUP during func tasks  Functional Mobility  Activity: To/from bathroom  Assist Level: Stand by assistance  Functional Mobility Comments: Pt noted minor weakness/ lower endurance d/t graft site in RLE  Bed mobility  Supine to Sit: Unable to assess  Sit to Supine: Unable to assess  Scooting: Supervision  Comment: Pt in chair upon arrival and at writer's exit  Transfers  Sit to stand: Stand by assistance  Stand to sit: Stand by assistance  Transfer Comments: Pt with good use of heart hugger during transfers Cognition  Overall Cognitive Status: [facility]        Plan   Plan  Times per week: 4-6x/wk (CABG)  Current Treatment Recommendations: Patient/Caregiver Education & Training, Home Management Training, Functional Mobility Training, Endurance Training, Safety Education & Training, Self-Care / ADL   Bed mobility    AM-PAC Score        AM-PAC Inpatient Daily Activity Raw Score: 20 (09/01/21 1304)  AM-PAC Inpatient ADL T-Scale Score : 42.03 (09/01/21 1304)  ADL Inpatient CMS 0-100% Score: 38.32 (09/01/21 1304)  ADL Inpatient CMS G-Code Modifier : Milagros Tovar (09/01/21 1304)    Goals  Short term goals  Time Frame for Short term goals: By discharge, pt will be able to:  Short term goal 1: Demo bed mob with Mod I  Short term goal 2: Demo func transfers, including EOB, toilet, and recliner, with Mod I and 0 VCs for correct hand placement  Short term goal 3: Demo func mob for household distances with Mod I and LRD PRN  Short term goal 4: Demo dynamic reaching/grasping in multiple planes during func activity with SUP and following all sternal precautions  Short term goal 5: Demo UB ADLs with Mod I and adaptive techniques PRN  Short term goal 6: Demo LB ADLs with SUP and AE/DME PRN  Short term goal 7:  Identify/demo all sternal precautions during func tasks with no more than 1 VC  Short term goal 8: Don heart hugger with Mod I and 0 VCs       Therapy Time   Individual Concurrent Group Co-treatment   Time In 1011         Time Out 1041         Minutes 30         Timed Code Treatment Minutes: [place] [address], S/OT

## 2021-09-01 NOTE — CONSULTS
Alvarado Mcfadden, Roseanna Leung, [facility]  Urology Consultation      Patient:  Jemal Canada  MRN: [id number]  YOB: 1955    CHIEF COMPLAINT:  AUR; POD2 from CABG    HISTORY OF PRESENT ILLNESS:   The patient is a 78 y.o. male who presented for scheduled CABG after MVCAD noted after cardiac cath done last week. He is POD#2. He has been unable to empty his bladder since yesterday, with PVRs 500-600mL. Patient states that he has been able to urinate 100-200mL prior to being bladder scanned. He has never seen a urologist before. He does admit to having a \"shy bladder\" and nocturia 2-3 times at night. Patient denies any fevers, chills, dysuria, hematuria, flank pain, abdominal pain. Patient has been ambulating well and independently since surgery, is passing gas, but no BM since 8/28, minimal pain medication use. Voiding at baseline: yes, some nocturia  History of UTIs: none  History of urolithiasis: none  Personal/family history of  malignancy: none     Creatinine 0.73; Urine culture 8/27 no growth. Patient's old records, notes and chart reviewed and summarized above. Past Medical History:    Past Medical History:   Diagnosis Date    Allergic rhinitis     Asthma     inhaler    CAD (coronary artery disease)     MI 7/26/2006; 2 stents.   Cardiology: Dr Angelo Morley care maintenance     PCP:  Juan ALVES   last seen 1/2021    Hypertension     Inguinal hernia     Irritant contact dermatitis due to plants, except food 08/17/2017    MI (myocardial infarction) [facility]) 2006       Past Surgical History:    Past Surgical History:   Procedure Laterality Date    CARDIAC CATHETERIZATION  08/23/2021    COLONOSCOPY  2004    CORONARY ARTERY BYPASS GRAFT N/A 8/30/2021    CABG CORONARY ARTERY BYPASS X3, ON PUMP, MAGNOLIA PER ANESTHESIA, ENDO RADIAL HARVEST performed by Amanda Mckeon MD at [address]      Inguinal- not sure of side Medications:      Current Facility-Administered Medications:     furosemide (LASIX) injection 20 mg, 20 mg, IntraVENous, BID, ISI France NP, 20 mg at 09/01/21 9988    metoclopramide (REGLAN) tablet 10 mg, 10 mg, Oral, TID AC, ISI France NP, 10 mg at 09/01/21 1046    magnesium hydroxide (MILK OF MAGNESIA) 400 MG/5ML suspension 30 mL, 30 mL, Oral, Daily PRN, Tony Perez MD, 30 mL at 08/31/21 1153    aluminum & magnesium hydroxide-simethicone (MAALOX) 200-200-20 MG/5ML suspension 15 mL, 15 mL, Oral, Q6H PRN, ISI France NP    insulin lispro (HUMALOG) injection vial 0-3 Units, 0-3 Units, SubCUTAneous, Nightly, ISI France NP    insulin lispro (HUMALOG) injection vial 0-6 Units, 0-6 Units, SubCUTAneous, TID WC, ISI France NP    tamsulosin (FLOMAX) capsule 0.4 mg, 0.4 mg, Oral, Daily, Joya Chester, APRN - CNP, 0.4 mg at 09/01/21 0817    potassium chloride (KLOR-CON M) extended release tablet 40 mEq, 40 mEq, Oral, PRN, 20 mEq at 09/01/21 0523 **OR** potassium bicarb-citric acid (EFFER-K) effervescent tablet 40 mEq, 40 mEq, Oral, PRN **OR** potassium chloride 10 mEq/100 mL IVPB (Peripheral Line), 10 mEq, IntraVENous, PRN, ISI Cervantes CNP    sodium chloride flush 0.9 % injection 10 mL, 10 mL, IntraVENous, 2 times per day, ISI Cervantes CNP, 10 mL at 09/01/21 0817    sodium chloride flush 0.9 % injection 10 mL, 10 mL, IntraVENous, PRN, ISI Cervantes - CNP    0.9 % sodium chloride infusion, 25 mL, IntraVENous, PRN, ISI Cervantes - CNP    ondansetron (ZOFRAN) injection 4 mg, 4 mg, IntraVENous, Q8H PRN, ISI Cervantes CNP    aspirin EC tablet 81 mg, 81 mg, Oral, Daily, ISI Cervantes CNP, 81 mg at 09/01/21 0817    clopidogrel (PLAVIX) tablet 75 mg, 75 mg, Oral, Daily, ISI Cervantes CNP, 75 mg at 09/01/21 0817    acetaminophen (TYLENOL) tablet 650 mg, 650 mg, Oral, Q4H PRN, Rey Myerss, APRN - CNP    oxyCODONE-acetaminophen (PERCOCET) 5-325 MG per tablet 1 tablet, 1 tablet, Oral, Q4H PRN, 1 tablet at 09/01/21 0524 **OR** oxyCODONE-acetaminophen (PERCOCET) 5-325 MG per tablet 2 tablet, 2 tablet, Oral, Q4H PRN, Eusebia Batesan, APRN - CNP, 2 tablet at 08/30/21 1507    fentaNYL (SUBLIMAZE) injection 25 mcg, 25 mcg, IntraVENous, Q1H PRN **OR** fentaNYL (SUBLIMAZE) injection 50 mcg, 50 mcg, IntraVENous, Q1H PRN, Rey Myerss, APRN - CNP, 50 mcg at 08/30/21 1612    amiodarone (CORDARONE) tablet 200 mg, 200 mg, Oral, TID, Suzan Causey, APRN - CNP, 200 mg at 09/01/21 1410    hydrALAZINE (APRESOLINE) injection 5 mg, 5 mg, IntraVENous, Q5 Min PRN, Suzan Causey, APRN - CNP    metoprolol (LOPRESSOR) injection 2.5 mg, 2.5 mg, IntraVENous, Q10 Min PRN, Suzan  Marium, APRN - CNP    mupirocin (BACTROBAN) 2 % ointment, , Nasal, BID, Suzan  Marium, APRN - CNP, Given at 09/01/21 4545    sodium bicarbonate 8.4 % injection 50 mEq, 50 mEq, IntraVENous, Q30 Min PRN, Suzan Causey APRN - CNP, 50 mEq at 08/30/21 1802    diphenhydrAMINE (BENADRYL) tablet 25 mg, 25 mg, Oral, Nightly PRN, Suzan Causey, APRN - CNP    polyethylene glycol (GLYCOLAX) packet 17 g, 17 g, Oral, Daily, Suzan  Marium, APRN - CNP, 17 g at 09/01/21 0817    bisacodyl (DULCOLAX) EC tablet 5 mg, 5 mg, Oral, Daily PRN, Rey Causey, APRN - CNP, 5 mg at 09/01/21 5956    fleet rectal enema 1 enema, 1 enema, Rectal, Daily PRN, ISI Aquino CNP    [Held by provider] metoprolol tartrate (LOPRESSOR) tablet 25 mg, 25 mg, Oral, BID, ISI Cervantes CNP    atorvastatin (LIPITOR) tablet 80 mg, 80 mg, Oral, Nightly, ISI Cervantes CNP, 80 mg at 08/31/21 1937    pantoprazole (PROTONIX) tablet 40 mg, 40 mg, Oral, Daily, ISI Cervantes CNP, 40 mg at 09/01/21 0817    pantoprazole (PROTONIX) injection 40 mg, 40 mg, IntraVENous, Daily, 40 mg at 08/30/21 1506 **AND** sodium chloride (PF) 0.9 % injection 10 mL, 10 mL, IntraVENous, Daily, Suzan Causey, APRN - CNP    calcium chloride 1,000 mg in sodium chloride 0.9 % 100 mL IVPB, 1,000 mg, IntraVENous, PRN, Suzan Causey, APRN - CNP    magnesium sulfate 1000 mg in dextrose 5% 100 mL IVPB, 1,000 mg, IntraVENous, PRN, Suzan Causey, APRN - CNP    potassium chloride 20 mEq/50 mL IVPB (Central Line), 20 mEq, IntraVENous, PRN, Suzan Causey, APRN - CNP    ipratropium-albuterol (DUONEB) nebulizer solution 1 ampule, 1 ampule, Inhalation, Q4H WASuzan APRN - CNP, 1 ampule at 09/01/21 1554    albumin human 5 % IV solution 25 g, 25 g, IntraVENous, PRN, Tejal Livan, APRN - CNP, Stopped at 08/30/21 1737    norepinephrine (LEVOPHED) 16 mg in sodium chloride 0.9 % 250 mL infusion, 0.04 mcg/kg/min, IntraVENous, Continuous PRN, Tejal Livan APRN - CNP, Stopped at 08/31/21 0000    insulin glargine (LANTUS) injection vial 11 Units, 0.15 Units/kg, SubCUTAneous, Nightly, Suzan Causey APRN - CNP    glucose (GLUTOSE) 40 % oral gel 15 g, 15 g, Oral, PRN, Suzan Causey APRN - CNP    dextrose 50 % IV solution, 12.5 g, IntraVENous, PRN, Suzan Causey, APRN - CNP    glucagon (rDNA) injection 1 mg, 1 mg, IntraMUSCular, PRN, Suzan Causey, APRN - CNP    dextrose 5 % solution, 100 mL/hr, IntraVENous, PRN, Suzan Causey, APRN - CNP    EPINEPHrine (EPINEPHrine HCL) 5 mg in dextrose 5 % 250 mL infusion, 0.02-0.07 mcg/kg/min, IntraVENous, Continuous PRN, Tejal Livan, APRN - CNP    Allergies:     Allergies   Allergen Reactions    Cephalexin Other (See Comments)     Pt unsure of reaction       Social History:   Social History     Socioeconomic History    Marital status:      Spouse name: Not on file    Number of children: Not on file    Years of education: Not on file    Highest education level: Not on file   Occupational History    Not on file   Tobacco Use    Smoking status: Never Smoker    Smokeless tobacco: Never Used   Substance and Sexual Activity    Alcohol use: No    Drug use: No    Sexual activity: Not on file   Other Topics Concern    Not on file   Social History Narrative    Not on file     Social Determinants of Health     Financial Resource Strain:     Difficulty of Paying Living Expenses:    Food Insecurity:     Worried About Running Out of Food in the Last Year:     [address] in the Last Year:    Transportation Needs:     Lack of Transportation (Medical):  Lack of Transportation (Non-Medical):    Physical Activity:     Days of Exercise per Week:     Minutes of Exercise per Session:    Stress:     Feeling of Stress :    Social Connections:     Frequency of Communication with Friends and Family:     Frequency of Social Gatherings with Friends and Family:     Attends Taoist Services:     Active Member of Clubs or Organizations:     Attends Club or Organization Meetings:     Marital Status:    Intimate Partner Violence:     Fear of Current or Ex-Partner:     Emotionally Abused:     Physically Abused:     Sexually Abused:        Family History:    Family History   Problem Relation Age of Onset    Stroke Father 67       REVIEW OF SYSTEMS:  A comprehensive 14 point review of systems was obtained.   Constitutional: No fatigue  Eyes: No blurry vision  Ears, nose, mouth, throat, face: No ringing in the ears; no facial droop  Respiratory: No cough or cold  Cardiovascular: No palpitations  Gastrointestinal: No diarrhea or constipation  Genitourinary: See HPI  Integument/Skin: No rashes  Hematologic/Lymphatic: No easy bruising  Musculoskeletal: No muscle pains  Neurologic: No weakness in the extremities  Psychiatric: No depression or suicidal thoughts  Endocrine: No heat or cold intolerances  Allergic/Immunologic: No current seasonal allergies; no skin hives      Physical Exam:    This a 78 y.o. male   Vitals:    09/01/21 1600   BP: 111/65 Pulse: 81   Resp: 16   Temp:    SpO2: 100%       Constitutional: Patient in no acute distress  Neuro: Alert and oriented to person place and time  Psych: Mood and affect normal  Head: Atraumatic and normocephalic  Neck: Trachea midline  Lungs: Respiratory effort normal  Cardiovascular:  Regular rhythm  Abdomen: Soft, non-tender, non-distended. CVA tenderness none  Ext: 2+ DP pulses bilaterally  Skin: No rashes or bruising present  Bladder: Non-tender and not distended  Lymphatics: No palpable lymphadenopathy    :  Penis normal and circumcised  Urethral meatus normal  Scrotal exam normal  Testicles normal bilaterally  Epididymis normal bilaterally  Inguinal hernia noted  EMILEE deferred     Labs:  Recent Labs     08/30/21 1403 08/30/21 1403 08/30/21 2147 08/31/21 0409 09/01/21  0420   WBC 20.6*  --   --  11.8* 11.1   HGB 12.2*   < > 10.0* 10.1* 10.3*   HCT 35.7*   < > 29.8* 30.1* 32.3*   MCV 94.9  --   --  97.4 101.3     --   --  See Reflexed IPF Result See Reflexed IPF Result    < > = values in this interval not displayed. Recent Labs     08/30/21 1403 08/30/21 1403 08/30/21 2147 08/31/21 0409 09/01/21  0420     --   --  140 134*   K 4.7   < > 4.6 4.1 4.1     --   --  109* 100   CO2 14*  --   --  23 22   BUN 16  --   --  14 14   CREATININE 0.78  --   --  0.68* 0.73    < > = values in this interval not displayed. No results for input(s): COLORU, PHUR, LABCAST, WBCUA, RBCUA, MUCUS, TRICHOMONAS, YEAST, BACTERIA, CLARITYU, SPECGRAV, LEUKOCYTESUR, UROBILINOGEN, Cleven Heydi in the last 72 hours.     Invalid input(s): NITRATE, GLUCOSEUKETONESUAMORPHOUS    Culture Results:  8/27/21 urine culture negative     -----------------------------------------------------------------  Imaging Results:  No pertinent imaging    Assessment and Plan   Impression:  79 yo male with multi vessel CAD s/p CABG     problem list -   -Acute Urinary Retention  -Nocturia/LUTS    Patient Active Problem

## 2021-09-01 NOTE — PROGRESS NOTES
[facility]   Progress Note                 Date:   9/1/2021  Patient name:  Sreedhar Flores  Date of admission:  8/30/2021  5:52 AM  MRN:   [id number]  YOB: 1955  PCP:    ISI Alexander CNP    Reason for Admission: CAD in native artery [I25.10]      Subjective:       Clinical Changes / Abnormalities:     Patient seen and examined. No acute events overnight. Remained hemodynamically stable and afebrile. I/O last 3 completed shifts: In: 530 [P.O.:720; I.V.:12; IV Piggyback:200]  Out: 2828 [Urine:1252; Chest Tube:445]  I/O this shift:  In: -   Out: 510 [Urine:400; Chest Tube:110]          Medications:   Scheduled Meds:    furosemide  20 mg IntraVENous BID    metoclopramide  10 mg Oral TID AC    insulin lispro  0-3 Units SubCUTAneous Nightly    insulin lispro  0-6 Units SubCUTAneous TID WC    tamsulosin  0.4 mg Oral Daily    sodium chloride flush  10 mL IntraVENous 2 times per day    aspirin  81 mg Oral Daily    clopidogrel  75 mg Oral Daily    amiodarone  200 mg Oral TID    mupirocin   Nasal BID    polyethylene glycol  17 g Oral Daily    [Held by provider] metoprolol tartrate  25 mg Oral BID    atorvastatin  80 mg Oral Nightly    pantoprazole  40 mg Oral Daily    pantoprazole  40 mg IntraVENous Daily    And    sodium chloride (PF)  10 mL IntraVENous Daily    ipratropium-albuterol  1 ampule Inhalation Q4H WA    insulin glargine  0.15 Units/kg SubCUTAneous Nightly     Continuous Infusions:    sodium chloride      norepinephrine Stopped (08/31/21 0000)    dextrose      EPINEPHrine      propofol Stopped (08/30/21 1613)     CBC:   Recent Labs     08/30/21  1403 08/30/21  1403 08/30/21  2147 08/31/21  0409 09/01/21  0420   WBC 20.6*  --   --  11.8* 11.1   HGB 12.2*   < > 10.0* 10.1* 10.3*     --   --  See Reflexed IPF Result See Reflexed IPF Result    < > = values in this interval not displayed.      BMP:    Recent Labs     08/30/21  1403 08/30/21  1403 08/30/21 2147 assessment, and plan with the student/resident/fellow/APN and agree with the note. I performed the history and physical personally. I have made changes to the note above as needed. Progressing well  Will follow with you    Thank you for allowing me to participate in the care of this patient, please do not hesitate to call if you have any questions. Elina Fong DO, [facility], [address], [address], [facility] AT [facility]  [facility]. [facility]  [phone]

## 2021-09-01 NOTE — PLAN OF CARE
Problem: Pain:  Description: Pain management should include both nonpharmacologic and pharmacologic interventions.   Goal: Pain level will decrease  Description: Pain level will decrease  Outcome: Ongoing  Goal: Control of acute pain  Description: Control of acute pain  Outcome: Ongoing  Goal: Control of chronic pain  Description: Control of chronic pain  Outcome: Ongoing     Problem: SKIN INTEGRITY  Goal: Skin integrity is maintained or improved  Outcome: Ongoing     Problem: Falls - Risk of:  Goal: Will remain free from falls  Description: Will remain free from falls  Outcome: Ongoing  Goal: Absence of physical injury  Description: Absence of physical injury  Outcome: Ongoing     Problem: Bleeding:  Goal: Will show no signs and symptoms of excessive bleeding  Description: Will show no signs and symptoms of excessive bleeding  Outcome: Ongoing     Problem: Musculor/Skeletal Functional Status  Goal: Highest potential functional level  Outcome: Ongoing  Goal: Absence of falls  Outcome: Ongoing

## 2021-09-01 NOTE — PROGRESS NOTES
agreeable for PT. Pt sitting in recliner upon arrivial, was pleasant and cooperative t/o. reports having trouble to go to the bathroom. Pain Screening  Patient Currently in Pain: Denies  Vital Signs  Patient Currently in Pain: Denies       Orientation  Orientation  Overall Orientation Status: Within Functional Limits  Cognition      Objective   Bed mobility  Supine to Sit: Unable to assess  Sit to Supine: Unable to assess  Comment: CHRISTINA  Transfers  Sit to Stand: Stand by assistance  Stand to sit: Stand by assistance  Comment: Pt continues to demo good return while using heart hugger. Ambulation  Ambulation?: Yes  Ambulation 1  Surface: level tile  Device: Rolling Walker  Assistance: Stand by assistance  Quality of Gait: cautious and steady  Gait Deviations: Decreased step length;Decreased step height  Distance: 300ft  Comments: no LOB noted, pt became a little fatigue post amb. Stairs/Curb  Stairs?: Yes  Stairs  # Steps : 12  Stairs Height: 6\"  Rails: Left ascending  Assistance: Contact guard assistance     Balance  Posture: Good  Sitting - Static: Good  Standing - Static: Good  Standing - Dynamic: Good;-  Comments: Standing balance assessed with RW   Exercises: Standing exercise program: Heel/toe raises, hip flexion, hip abduction, mini squats, hip extension, and hamstring curls.  Reps: 10x     AM-PAC Score  AM-PAC Inpatient Mobility Raw Score : 24 (09/01/21 0918)  AM-PAC Inpatient T-Scale Score : 61.14 (09/01/21 3191)  Mobility Inpatient CMS 0-100% Score: 0 (09/01/21 0918)  Mobility Inpatient CMS G-Code Modifier : [facility] (09/01/21 5159)   Goals  Short term goals  Time Frame for Short term goals: 10  Short term goal 1: Pt to perform bed mobility and functional transfer independently  Short term goal 2: Ambulate 600ft with no AD independently  Short term goal 3: Ascend/descend 9 stairs with one rail to simulate home environment to reach second level SBA  Short term goal 4: Demonstrate independent performance of cardiac HEP prior to discharge  Patient Goals   Patient goals : To go home    Plan    Plan  Times per week: 6-7x/week; 1-2/day  Times per day: Daily  Current Treatment Recommendations: Strengthening, Transfer Training, Endurance Training, Patient/Caregiver Education & Training, Gait Training, Balance Training, Stair training, Functional Mobility Training  Safety Devices  Type of devices: Left in chair, Call light within reach, Gait belt, Nurse notified  Restraints  Initially in place: No     Therapy Time   Individual Concurrent Group Co-treatment   Time In 0844         Time Out 0907         Minutes 23         Timed Code Treatment Minutes: 23 Minutes    Treatment performed by Student PTA under the supervision of co-signing PTA who agrees with all treatment and documentation. Dwain Ferreira PTA. Zoran Rodrigues SPTA.

## 2021-09-01 NOTE — PLAN OF CARE
BRONCHOSPASM/BRONCHOCONSTRICTION     [x]         IMPROVE AERATION/BREATH SOUNDS  [x]   ADMINISTER BRONCHODILATOR THERAPY AS APPROPRIATE  [x]   ASSESS BREATH SOUNDS  []   IMPLEMENT AEROSOL/MDI PROTOCOL  [x]   PATIENT EDUCATION AS NEEDED    ATELECTASIS     [x]  PREVENT ATELECTASIS  [x]   ASSESS BREATH SOUNDS  []   IMPLEMENT HYPERINFLATION PROTOCOL  [x]  INCENTIVE SPIROMETRY INSTRUCTION  [x]   PATIENT EDUCATION AS NEEDED    PATIENT REFUSES TO WEAR BIPAP     [x] Risks and benefits explained to patient   [x] Patient refuses to wear Bipap stating \"no\"  [x] Patient verbalizes understanding of information presented.

## 2021-09-02 ENCOUNTER — APPOINTMENT (OUTPATIENT)
Dept: GENERAL RADIOLOGY | Age: 66
DRG: 236 | End: 2021-09-02
Attending: THORACIC SURGERY (CARDIOTHORACIC VASCULAR SURGERY)
Payer: COMMERCIAL

## 2021-09-02 VITALS
BODY MASS INDEX: 24.6 KG/M2 | HEART RATE: 86 BPM | OXYGEN SATURATION: 96 % | SYSTOLIC BLOOD PRESSURE: 107 MMHG | WEIGHT: 175.71 LBS | HEIGHT: 71 IN | DIASTOLIC BLOOD PRESSURE: 61 MMHG | RESPIRATION RATE: 16 BRPM | TEMPERATURE: 98.6 F

## 2021-09-02 LAB
ANION GAP SERPL CALCULATED.3IONS-SCNC: 11 MMOL/L (ref 9–17)
BUN BLDV-MCNC: 15 MG/DL (ref 8–23)
BUN/CREAT BLD: ABNORMAL (ref 9–20)
CALCIUM SERPL-MCNC: 8.4 MG/DL (ref 8.6–10.4)
CHLORIDE BLD-SCNC: 99 MMOL/L (ref 98–107)
CO2: 25 MMOL/L (ref 20–31)
CREAT SERPL-MCNC: 0.8 MG/DL (ref 0.7–1.2)
GFR AFRICAN AMERICAN: >60 ML/MIN
GFR NON-AFRICAN AMERICAN: >60 ML/MIN
GFR SERPL CREATININE-BSD FRML MDRD: ABNORMAL ML/MIN/{1.73_M2}
GFR SERPL CREATININE-BSD FRML MDRD: ABNORMAL ML/MIN/{1.73_M2}
GLUCOSE BLD-MCNC: 100 MG/DL (ref 70–99)
GLUCOSE BLD-MCNC: 102 MG/DL (ref 75–110)
HCT VFR BLD CALC: 32.8 % (ref 40.7–50.3)
HEMOGLOBIN: 10.8 G/DL (ref 13–17)
INR BLD: 1
MAGNESIUM: 2 MG/DL (ref 1.6–2.6)
MCH RBC QN AUTO: 32.7 PG (ref 25.2–33.5)
MCHC RBC AUTO-ENTMCNC: 32.9 G/DL (ref 28.4–34.8)
MCV RBC AUTO: 99.4 FL (ref 82.6–102.9)
NRBC AUTOMATED: 0 PER 100 WBC
PDW BLD-RTO: 11.9 % (ref 11.8–14.4)
PLATELET # BLD: 144 K/UL (ref 138–453)
PMV BLD AUTO: 10.6 FL (ref 8.1–13.5)
POTASSIUM SERPL-SCNC: 3.8 MMOL/L (ref 3.7–5.3)
PROTHROMBIN TIME: 10.5 SEC (ref 9.1–12.3)
RBC # BLD: 3.3 M/UL (ref 4.21–5.77)
SODIUM BLD-SCNC: 135 MMOL/L (ref 135–144)
WBC # BLD: 9.4 K/UL (ref 3.5–11.3)

## 2021-09-02 PROCEDURE — 2580000003 HC RX 258: Performed by: NURSE PRACTITIONER

## 2021-09-02 PROCEDURE — 6370000000 HC RX 637 (ALT 250 FOR IP): Performed by: THORACIC SURGERY (CARDIOTHORACIC VASCULAR SURGERY)

## 2021-09-02 PROCEDURE — 85610 PROTHROMBIN TIME: CPT

## 2021-09-02 PROCEDURE — 83735 ASSAY OF MAGNESIUM: CPT

## 2021-09-02 PROCEDURE — 80048 BASIC METABOLIC PNL TOTAL CA: CPT

## 2021-09-02 PROCEDURE — 6360000002 HC RX W HCPCS: Performed by: NURSE PRACTITIONER

## 2021-09-02 PROCEDURE — 82947 ASSAY GLUCOSE BLOOD QUANT: CPT

## 2021-09-02 PROCEDURE — 51798 US URINE CAPACITY MEASURE: CPT

## 2021-09-02 PROCEDURE — 6370000000 HC RX 637 (ALT 250 FOR IP): Performed by: NURSE PRACTITIONER

## 2021-09-02 PROCEDURE — 85027 COMPLETE CBC AUTOMATED: CPT

## 2021-09-02 PROCEDURE — 36415 COLL VENOUS BLD VENIPUNCTURE: CPT

## 2021-09-02 PROCEDURE — 97116 GAIT TRAINING THERAPY: CPT

## 2021-09-02 PROCEDURE — 97535 SELF CARE MNGMENT TRAINING: CPT

## 2021-09-02 PROCEDURE — 71045 X-RAY EXAM CHEST 1 VIEW: CPT

## 2021-09-02 PROCEDURE — 97110 THERAPEUTIC EXERCISES: CPT

## 2021-09-02 PROCEDURE — 99024 POSTOP FOLLOW-UP VISIT: CPT | Performed by: NURSE PRACTITIONER

## 2021-09-02 RX ORDER — CLOPIDOGREL BISULFATE 75 MG/1
75 TABLET ORAL DAILY
Qty: 30 TABLET | Refills: 3 | Status: SHIPPED | OUTPATIENT
Start: 2021-09-03

## 2021-09-02 RX ORDER — OXYCODONE HYDROCHLORIDE AND ACETAMINOPHEN 5; 325 MG/1; MG/1
1 TABLET ORAL EVERY 8 HOURS PRN
Qty: 21 TABLET | Refills: 0 | Status: SHIPPED | OUTPATIENT
Start: 2021-09-02 | End: 2021-09-09

## 2021-09-02 RX ORDER — ATORVASTATIN CALCIUM 80 MG/1
80 TABLET, FILM COATED ORAL NIGHTLY
Qty: 30 TABLET | Refills: 3 | Status: SHIPPED | OUTPATIENT
Start: 2021-09-02

## 2021-09-02 RX ORDER — TAMSULOSIN HYDROCHLORIDE 0.4 MG/1
0.4 CAPSULE ORAL DAILY
Qty: 30 CAPSULE | Refills: 3 | Status: SHIPPED | OUTPATIENT
Start: 2021-09-03

## 2021-09-02 RX ORDER — METOPROLOL SUCCINATE 25 MG/1
12.5 TABLET, EXTENDED RELEASE ORAL NIGHTLY
Status: DISCONTINUED | OUTPATIENT
Start: 2021-09-02 | End: 2021-09-02 | Stop reason: HOSPADM

## 2021-09-02 RX ORDER — FLUTICASONE PROPIONATE 110 UG/1
2 AEROSOL, METERED RESPIRATORY (INHALATION) 2 TIMES DAILY
Status: DISCONTINUED | OUTPATIENT
Start: 2021-09-02 | End: 2021-09-02 | Stop reason: HOSPADM

## 2021-09-02 RX ORDER — METOPROLOL SUCCINATE 25 MG/1
12.5 TABLET, EXTENDED RELEASE ORAL NIGHTLY
Qty: 30 TABLET | Refills: 3 | Status: SHIPPED | OUTPATIENT
Start: 2021-09-02

## 2021-09-02 RX ORDER — FLUTICASONE PROPIONATE 110 UG/1
1 AEROSOL, METERED RESPIRATORY (INHALATION) 2 TIMES DAILY
Status: DISCONTINUED | OUTPATIENT
Start: 2021-09-02 | End: 2021-09-02

## 2021-09-02 RX ORDER — ALBUTEROL SULFATE 90 UG/1
2 AEROSOL, METERED RESPIRATORY (INHALATION) EVERY 6 HOURS PRN
Status: DISCONTINUED | OUTPATIENT
Start: 2021-09-02 | End: 2021-09-02 | Stop reason: HOSPADM

## 2021-09-02 RX ORDER — AMIODARONE HYDROCHLORIDE 200 MG/1
200 TABLET ORAL 2 TIMES DAILY
Qty: 30 TABLET | Refills: 0 | Status: SHIPPED | OUTPATIENT
Start: 2021-09-02 | End: 2021-09-22 | Stop reason: ALTCHOICE

## 2021-09-02 RX ADMIN — AMIODARONE HYDROCHLORIDE 200 MG: 200 TABLET ORAL at 08:01

## 2021-09-02 RX ADMIN — MAGNESIUM CITRATE 296 ML: 1.75 LIQUID ORAL at 05:09

## 2021-09-02 RX ADMIN — METOCLOPRAMIDE 10 MG: 10 TABLET ORAL at 07:14

## 2021-09-02 RX ADMIN — MUPIROCIN: 20 OINTMENT TOPICAL at 08:01

## 2021-09-02 RX ADMIN — SODIUM CHLORIDE, PRESERVATIVE FREE 10 ML: 5 INJECTION INTRAVENOUS at 08:00

## 2021-09-02 RX ADMIN — AMIODARONE HYDROCHLORIDE 200 MG: 200 TABLET ORAL at 13:57

## 2021-09-02 RX ADMIN — CLOPIDOGREL 75 MG: 75 TABLET, FILM COATED ORAL at 08:01

## 2021-09-02 RX ADMIN — POTASSIUM CHLORIDE 30 MEQ: 1500 TABLET, EXTENDED RELEASE ORAL at 06:37

## 2021-09-02 RX ADMIN — ASPIRIN 81 MG: 81 TABLET, COATED ORAL at 08:01

## 2021-09-02 RX ADMIN — PANTOPRAZOLE SODIUM 40 MG: 40 TABLET, DELAYED RELEASE ORAL at 08:01

## 2021-09-02 RX ADMIN — METOCLOPRAMIDE 10 MG: 10 TABLET ORAL at 11:28

## 2021-09-02 RX ADMIN — FUROSEMIDE 20 MG: 10 INJECTION, SOLUTION INTRAMUSCULAR; INTRAVENOUS at 08:01

## 2021-09-02 RX ADMIN — TAMSULOSIN HYDROCHLORIDE 0.4 MG: 0.4 CAPSULE ORAL at 08:01

## 2021-09-02 ASSESSMENT — PAIN DESCRIPTION - ORIENTATION: ORIENTATION: MID

## 2021-09-02 ASSESSMENT — PAIN DESCRIPTION - DESCRIPTORS: DESCRIPTORS: DISCOMFORT

## 2021-09-02 ASSESSMENT — PAIN DESCRIPTION - LOCATION: LOCATION: CHEST;INCISION

## 2021-09-02 ASSESSMENT — PAIN DESCRIPTION - PAIN TYPE: TYPE: SURGICAL PAIN

## 2021-09-02 ASSESSMENT — PAIN SCALES - GENERAL
PAINLEVEL_OUTOF10: 0
PAINLEVEL_OUTOF10: 3
PAINLEVEL_OUTOF10: 0

## 2021-09-02 NOTE — PROGRESS NOTES
Fabián Mead MD FACS    Urology Progress Note    Subjective:  No acute events overnight  No fevers or chills  No nausea or vomiting, tolerating regular diet  Required straight catheterization overnight for 400cc, this am     Patient Vitals for the past 24 hrs:   BP Temp Temp src Pulse Resp SpO2   09/02/21 1200 107/61 98.6 °F (37 °C) Oral 86 16 96 %   09/02/21 0800 114/67 98.2 °F (36.8 °C) Oral 80 16 96 %   09/02/21 0503 -- 98 °F (36.7 °C) Oral -- -- --   09/01/21 2350 111/71 98.1 °F (36.7 °C) Oral -- 17 --   09/01/21 2039 -- -- -- -- 16 98 %   09/01/21 2000 -- 98.8 °F (37.1 °C) Oral -- -- --   09/01/21 1600 111/65 98.4 °F (36.9 °C) Oral 81 16 100 %   09/01/21 1500 106/62 -- -- 81 16 95 %   09/01/21 1400 (!) 105/57 98.2 °F (36.8 °C) -- 83 16 92 %       Intake/Output Summary (Last 24 hours) at 9/2/2021 1307  Last data filed at 9/2/2021 1100  Gross per 24 hour   Intake 120 ml   Output 2614 ml   Net -2494 ml       Recent Labs     08/31/21 0409 09/01/21 0420 09/02/21  0523   WBC 11.8* 11.1 9.4   HGB 10.1* 10.3* 10.8*   HCT 30.1* 32.3* 32.8*   MCV 97.4 101.3 99.4   PLT See Reflexed IPF Result See Reflexed IPF Result 144     Recent Labs     08/31/21 0409 09/01/21  0420 09/02/21  0523    134* 135   K 4.1 4.1 3.8   * 100 99   CO2 23 22 25   BUN 14 14 15   CREATININE 0.68* 0.73 0.80       No results for input(s): COLORU, PHUR, LABCAST, WBCUA, RBCUA, MUCUS, TRICHOMONAS, YEAST, BACTERIA, CLARITYU, SPECGRAV, LEUKOCYTESUR, UROBILINOGEN, BILIRUBINUR, BLOODU in the last 72 hours.     Invalid input(s): NITRATE, GLUCOSEUKETONESUAMORPHOUS    Additional Lab/culture results:    Physical Exam:   Gen: NAD  Cardio: acyanotic, pusles palpable  Resp: NL breathing on room air  Abd: soft, nontender, nondistended, halter montior in place  : no SP tenderness, no flank pain  Skin: warm and dry  Extremities: no peripheral edema      Impression:    Urinary retention    Plan:  Continue to check PVRs  Flomax  Recommend aggressive bowel regimen, as patient has not defecated in one week  Encourage ambulation    [facility], DO  1:07 PM 9/2/2021

## 2021-09-02 NOTE — CARE COORDINATION
Pacing wires clipped and incisions assessed in preparation for discharge. Incisions well approximated with no erythema, edema or drainage noted. Steri-strips to chest tube sites. Incisions may remain open to air.       Julia Vega, APRN - CNP

## 2021-09-02 NOTE — PLAN OF CARE
Problem: Pain:  Goal: Pain level will decrease  Description: Pain level will decrease  Outcome: Ongoing  Goal: Control of acute pain  Description: Control of acute pain  Outcome: Ongoing  Goal: Control of chronic pain  Description: Control of chronic pain  Outcome: Ongoing     Problem: SKIN INTEGRITY  Goal: Skin integrity is maintained or improved  Outcome: Ongoing     Problem: Falls - Risk of:  Goal: Will remain free from falls  Description: Will remain free from falls  Outcome: Ongoing  Goal: Absence of physical injury  Description: Absence of physical injury  Outcome: Ongoing     Problem: Bleeding:  Goal: Will show no signs and symptoms of excessive bleeding  Description: Will show no signs and symptoms of excessive bleeding  Outcome: Ongoing     Problem: Musculor/Skeletal Functional Status  Goal: Highest potential functional level  Outcome: Ongoing  Goal: Absence of falls  Outcome: Ongoing

## 2021-09-02 NOTE — CARE COORDINATION
Met with patient to discuss transitional planning.   Plan is home with wife, who is a RN, he is declining home care    Discharge [facility] Case Management Department  Written by: Robert Villareal RN    Patient Name: Chava Johansen  Attending Provider: Prashant Simpson MD  Admit Date: 2021  5:52 AM  MRN: [id number]  Account: [de-identified]                     : 1955  Discharge Date:  2021        Disposition: home    Robert Villareal RN

## 2021-09-02 NOTE — PLAN OF CARE
Problem: Pain:  Description: Pain management should include both nonpharmacologic and pharmacologic interventions.   Goal: Pain level will decrease  Description: Pain level will decrease  9/2/2021 0853 by Sheri Potts RN  Outcome: Ongoing  9/2/2021 0438 by Karen Ralph RN  Outcome: Ongoing  Goal: Control of acute pain  Description: Control of acute pain  9/2/2021 0853 by Sheri Potts RN  Outcome: Ongoing  9/2/2021 0438 by Karen Ralph RN  Outcome: Ongoing  Goal: Control of chronic pain  Description: Control of chronic pain  9/2/2021 0853 by Sheri Potts RN  Outcome: Ongoing  9/2/2021 0438 by Karen Ralph RN  Outcome: Ongoing     Problem: SKIN INTEGRITY  Goal: Skin integrity is maintained or improved  9/2/2021 0853 by Sheri Potts RN  Outcome: Ongoing  9/2/2021 0438 by Karen Ralph RN  Outcome: Ongoing     Problem: Falls - Risk of:  Goal: Will remain free from falls  Description: Will remain free from falls  9/2/2021 0853 by Sheri Potts RN  Outcome: Ongoing  9/2/2021 0438 by Karen Ralph RN  Outcome: Ongoing  Goal: Absence of physical injury  Description: Absence of physical injury  9/2/2021 0853 by Sheri Potts RN  Outcome: Ongoing  9/2/2021 0438 by Karen Ralph RN  Outcome: Ongoing     Problem: Bleeding:  Goal: Will show no signs and symptoms of excessive bleeding  Description: Will show no signs and symptoms of excessive bleeding  9/2/2021 0853 by Sheri Potts RN  Outcome: Ongoing  9/2/2021 0438 by Karen Ralph RN  Outcome: Ongoing     Problem: Musculor/Skeletal Functional Status  Goal: Highest potential functional level  9/2/2021 0853 by Sheri Potts RN  Outcome: Ongoing  9/2/2021 0438 by Karen Ralph RN  Outcome: Ongoing  Goal: Absence of falls  9/2/2021 0853 by Sheri Potts RN  Outcome: Ongoing  9/2/2021 0438 by Karen Ralph RN  Outcome: Ongoing

## 2021-09-02 NOTE — DISCHARGE INSTR - DIET
 Good nutrition is important when healing from an illness, injury, or surgery. Follow any nutrition recommendations given to you during your hospital stay.  If you were given an oral nutrition supplement while in the hospital, continue to take this supplement at home. You can take it with meals, in-between meals, and/or before bedtime. These supplements can be purchased at most local grocery stores, pharmacies, and chain super-stores.  If you have any questions about your diet or nutrition, call the hospital and ask for the dietitian. Low Sodium Diet (2,000 Milligram): Care Instructions  Overview     Limiting sodium can be an important part of managing some health problems. The most common source of sodium is salt. People get most of the salt in their diet from canned, prepared, and packaged foods. Fast food and restaurant meals also are very high in sodium. Your doctor will probably limit your sodium to less than 2,000 milligrams (mg) a day. This limit counts all the sodium in prepared and packaged foods and any salt you add to your food. Follow-up care is a key part of your treatment and safety. Be sure to make and go to all appointments, and call your doctor if you are having problems. It's also a good idea to know your test results and keep a list of the medicines you take. How can you care for yourself at home? Read food labels  · Read labels on cans and food packages. The labels tell you how much sodium is in each serving. Make sure that you look at the serving size. If you eat more than the serving size, you have eaten more sodium. · Food labels also tell you the Percent Daily Value for sodium. Choose products with low Percent Daily Values for sodium. · Be aware that sodium can come in forms other than salt, including monosodium glutamate (MSG), sodium citrate, and sodium bicarbonate (baking soda). MSG is often added to Asian food.  When you eat out, you can sometimes ask for food without MSG or added salt. Buy low-sodium foods  · Buy foods that are labeled \"unsalted\" (no salt added), \"sodium-free\" (less than 5 mg of sodium per serving), or \"low-sodium\" (140 mg or less of sodium per serving). Foods labeled \"reduced-sodium\" and \"light sodium\" may still have too much sodium. Be sure to read the label to see how much sodium you are getting. · Buy fresh vegetables, or frozen vegetables without added sauces. Buy low-sodium versions of canned vegetables, soups, and other canned goods. Prepare low-sodium meals  · Cut back on the amount of salt you use in cooking. This will help you adjust to the taste. Do not add salt after cooking. One teaspoon of salt has about 2,300 mg of sodium. · Take the salt shaker off the table. · Flavor your food with garlic, lemon juice, onion, vinegar, herbs, and spices. Do not use soy sauce, lite soy sauce, steak sauce, onion salt, garlic salt, celery salt, or ketchup on your food. · Use low-sodium salad dressings, sauces, and ketchup. Or make your own salad dressings and sauces without adding salt. · Use less salt (or none) when recipes call for it. You can often use half the salt a recipe calls for without losing flavor. Other foods such as rice, pasta, and grains do not need added salt. · Rinse canned vegetables, and cook them in fresh water. This removes some--but not all--of the salt. · Avoid water that is naturally high in sodium or that has been treated with water softeners, which add sodium. If you buy bottled water, read the label and choose a sodium-free brand. Avoid high-sodium foods  · Avoid eating:  ? Smoked, cured, salted, and canned meat, fish, and poultry. ? Ham, torrez, hot dogs, and luncheon meats. ? Regular, hard, and processed cheese and regular peanut butter. ? Crackers with salted tops, and other salted snack foods such as pretzels, chips, and salted popcorn. ? Frozen prepared meals, unless labeled low-sodium. ?  Canned and dried soups, broths, and bouillon, unless labeled sodium-free or low-sodium. ? Canned vegetables, unless labeled sodium-free or low-sodium. ? Western Iram fries, pizza, tacos, and other fast foods. ? Pickles, olives, ketchup, and other condiments, especially soy sauce, unless labeled sodium-free or low-sodium. Where can you learn more? Go to https://[facility].Fluidinfo. org and sign in to your [employer] account. Enter I356 in the [facility] box to learn more about \"Low Sodium Diet (2,000 Milligram): Care Instructions. \"     If you do not have an account, please click on the \"Sign Up Now\" link. Current as of: December 17, 2020               Content Version: 12.9  © 7907-5081 Healthwise, Incorporated. Care instructions adapted under license by [facility] ([facility]). If you have questions about a medical condition or this instruction, always ask your healthcare professional. [address] any warranty or liability for your use of this information.

## 2021-09-02 NOTE — PROGRESS NOTES
Occupational Therapy  Facility/Department: [facility] CAR 1  Daily Treatment Note  NAME: Jennifer Javed  : 1955  MRN: [id number]    Date of Service: 2021    Discharge Recommendations:  Patient would benefit from continued therapy after discharge in order to increase pt independence. Assessment   Performance deficits / Impairments: Decreased functional mobility ; Decreased endurance;Decreased ADL status; Decreased high-level IADLs  Prognosis: Good  OT Education: OT Role;Transfer Training;Precautions  Patient Education: purpose of OT; heart hugger management; sternal precautions  Barriers to Learning: pt brayden CARR carry over able to recall 2/4 sternal precautions  REQUIRES OT FOLLOW UP: Yes  Activity Tolerance  Activity Tolerance: Patient Tolerated treatment well  Safety Devices  Safety Devices in place: Yes  Type of devices: Left in chair;Call light within reach  Restraints  Initially in place: No       Patient Diagnosis(es): The primary encounter diagnosis was CAD in native artery. A diagnosis of Post-op pain was also pertinent to this visit. has a past medical history of Allergic rhinitis, Asthma, CAD (coronary artery disease), Health care maintenance, Hypertension, Inguinal hernia, Irritant contact dermatitis due to plants, except food, and MI (myocardial infarction) ([facility] [address] has a past surgical history that includes Colonoscopy (); Cardiac catheterization (2021); hernia repair; and Coronary artery bypass graft (N/A, 2021).     Restrictions  Restrictions/Precautions  Restrictions/Precautions: Cardiac, Fall Risk  Required Braces or Orthoses?: Yes  Required Braces or Orthoses  Other: Heart Hugger Brace  Position Activity Restriction  Sternal Precautions: No Pushing, No Pulling, 5# Lifting Restrictions  Other position/activity restrictions: Up in chair, ambulate pt, up for meals;  CABGx3 (21)  Subjective   General  Chart Reviewed: Yes  Patient assessed for rehabilitation services?: Yes  Family / Caregiver Present: No  General Comment  Comments: Pt and RN agreeable to therapy  Pain Assessment  Pain Assessment: 0-10  Pain Level: 0  Pre Treatment Pain Screening  Comments / Details: Pt seated on toilet at start of session. At session end pt seated in chair with call light in reach, BLE elevated and all needs met. Vital Signs  Patient Currently in Pain: Denies   Orientation  Orientation  Overall Orientation Status: Within Functional Limits  Objective    ADL  Grooming: Supervision (hand hygiene completed standing at sink)  LE Dressing: Setup;Modified independent  (to doff/don socks)  Toileting: Supervision (seated/standing at toilet)  Additional Comments: Pt seated on toilet at start of session. SUP for personal hygiene and clothing management standing without AD. SUP for hand hygiene completed standing at sink without AD. Mod I seated in chair to doff/don socks brayden CARR hip flexion with G 4 figure technique. Throughout session pt limited per fatigue.      Balance  Sitting Balance: Independent (Pt tolerated approx 3 min static/dynamic sitting unsupported)  Standing Balance: Supervision  Standing Balance  Time: Pt tolerated approx 6 min  Activity: dynamic standing and during mobility  Comment: without AD  Functional Mobility  Functional - Mobility Device: No device  Activity: To/from bathroom  Assist Level: Supervision  Functional Mobility Comments: 0 LOB noted  Toilet Transfers  Toilet - Technique: Ambulating  Equipment Used: Standard toilet  Toilet Transfer: Supervision  Toilet Transfers Comments: 0 LOB  Bed mobility  Comment: pt seated on toilet at start of session, retired to toilet at session end  Transfers  Sit to stand: Supervision  Stand to sit: Supervision  Transfer Comments: without AD brayden CARR heart hugger manaement     Cognition  Overall Cognitive Status: [address]  Times per week: 4-6x/wk (CABG)  Current Treatment Recommendations: Patient/Caregiver Education & Training, Home Management Training, Functional Mobility Training, Endurance Training, Safety Education & Training, Self-Care / ADL    AM-PAC Score  AM-PAC Inpatient Daily Activity Raw Score: 19 (09/02/21 1357)  AM-PAC Inpatient ADL T-Scale Score : 40.22 (09/02/21 1357)  ADL Inpatient CMS 0-100% Score: 42.8 (09/02/21 1357)  ADL Inpatient CMS G-Code Modifier : CK (09/02/21 1357)    Goals  Short term goals  Time Frame for Short term goals: By discharge, pt will be able to:  Short term goal 1: Demo bed mob with Mod I  Short term goal 2: Demo func transfers, including EOB, toilet, and recliner, with Mod I and 0 VCs for correct hand placement  Short term goal 3: Demo func mob for household distances with Mod I and LRD PRN  Short term goal 4: Demo dynamic reaching/grasping in multiple planes during func activity with SUP and following all sternal precautions  Short term goal 5: Demo UB ADLs with Mod I and adaptive techniques PRN  Short term goal 6: Demo LB ADLs with SUP and AE/DME PRN  Short term goal 7:  Identify/demo all sternal precautions during func tasks with no more than 1 VC  Short term goal 8: Annabelle Hugo heart hugger with Mod I and 0 VCs       Therapy Time   Individual Concurrent Group Co-treatment   Time In 1056         Time Out 1105         Minutes 9         Timed Code Treatment Minutes: 20 [facility], HUDSON/L

## 2021-09-02 NOTE — PROGRESS NOTES
Pt discharge instructions gone over with wife present at bedside, all questions addressed and answered fully. Meds to beds delivered prescribed medication.  Pt ready for discharge

## 2021-09-02 NOTE — PLAN OF CARE
BRONCHOSPASM/BRONCHOCONSTRICTION     [x]         IMPROVE AERATION/BREATH SOUNDS  [x]   ADMINISTER BRONCHODILATOR THERAPY AS APPROPRIATE  [x]   ASSESS BREATH SOUNDS  []   IMPLEMENT AEROSOL/MDI PROTOCOL  [x]   PATIENT EDUCATION AS NEEDED    ATELECTASIS     [x]  PREVENT ATELECTASIS  [x]   ASSESS BREATH SOUNDS  []   IMPLEMENT HYPERINFLATION PROTOCOL  [x]  INCENTIVE SPIROMETRY INSTRUCTION  [x]   PATIENT EDUCATION AS NEEDED    MOBILIZE SECRETIONS    [x]   ASSESS BREATH SOUNDS  [x]   ASSESS SPUTUM PRODUCTION  [x]   COUGH AND DEEP BREATHING  []  IMPLEMENT SECRETION MANAGEMENT PROTOCOL  [x]   PATIENT EDUCATION AS NEEDED

## 2021-09-02 NOTE — PLAN OF CARE
Problem: Respiratory  Intervention: Administer treatments as ordered  Note: BRONCHOSPASM/BRONCHOCONSTRICTION     [x]         IMPROVE AERATION/BREATH SOUNDS  [x]   ADMINISTER BRONCHODILATOR THERAPY AS APPROPRIATE  [x]   ASSESS BREATH SOUNDS  []   IMPLEMENT AEROSOL/MDI PROTOCOL  [x]   PATIENT EDUCATION AS NEEDED

## 2021-09-02 NOTE — PROGRESS NOTES
Physical Therapy  Facility/Department: [facility] CAR 1  Daily Treatment Note  NAME: Kodi Davenport  : 1955  MRN: [id number]    Date of Service: 2021    Discharge Recommendations: Further therapy recommended at discharge. PT Equipment Recommendations  Equipment Needed: No    Assessment   Body structures, Functions, Activity limitations: Decreased functional mobility ; Decreased posture;Decreased endurance;Decreased balance  Assessment: Pt amb 300' w/o AD x SBA, no LOB noted. Performed 16 stairs using L handrail x CGA. Pt will benefit from continued therapy to return to PLOF. Prognosis: Good  Decision Making: Medium Complexity  REQUIRES PT FOLLOW UP: Yes  Activity Tolerance  Activity Tolerance: Patient Tolerated treatment well     Patient Diagnosis(es): The encounter diagnosis was CAD in native artery. has a past medical history of Allergic rhinitis, Asthma, CAD (coronary artery disease), Health care maintenance, Hypertension, Inguinal hernia, Irritant contact dermatitis due to plants, except food, and MI (myocardial infarction) ([facility] [address] has a past surgical history that includes Colonoscopy (); Cardiac catheterization (2021); hernia repair; and Coronary artery bypass graft (N/A, 2021). Restrictions  Restrictions/Precautions  Restrictions/Precautions: Cardiac, Fall Risk  Required Braces or Orthoses?: Yes  Required Braces or Orthoses  Other: Heart Hugger Brace  Position Activity Restriction  Sternal Precautions: No Pushing, No Pulling, 5# Lifting Restrictions  Other position/activity restrictions: Up in chair, ambulate pt, up for meals;  CABGx3 (21)  Subjective   General  Chart Reviewed: Yes  Response To Previous Treatment: Patient with no complaints from previous session. Family / Caregiver Present: No  Subjective  Subjective: RN and pt in agreeable for PT. Pt sitting in recliner upon arrivial, was pleasant and cooperative t/o.   Pain Screening  Patient Currently in Pain: Denies  Vital Signs  Patient Currently in Pain: Denies       Orientation  Orientation  Overall Orientation Status: Within Functional Limits  Cognition      Objective   Bed mobility  Comment: CHRISTINA  Transfers  Sit to Stand: Supervision  Stand to sit: Supervision  Comment: Pt demo good safety and correct sequencing durring all transfers. Ambulation  Ambulation?: Yes  More Ambulation?: No  Ambulation 1  Surface: level tile  Device: No Device  Assistance: Stand by assistance  Quality of Gait: cautious and steady  Distance: 300'  Comments: Pt was able to progress to no AD this date, reports feeling well and demo good safety awareness during amb. Stairs/Curb  Stairs?: Yes  Stairs  Stairs Height: 6\"  Rails: Left ascending  Assistance: Contact guard assistance     Balance  Posture: Good  Sitting - Static: Good  Sitting - Dynamic: Good  Standing - Static: Good  Standing - Dynamic: Good;-  Comments: Standing balance assessed with RW   Exercises: Standing exercise program: Heel/toe raises, hip flexion, hip abduction, mini squats, hip extension, and hamstring curls. Reps: 15x   AM-PAC Score  AM-PAC Inpatient Mobility Raw Score : 24 (09/02/21 [facility])  AM-PAC Inpatient T-Scale Score : 61.14 (09/02/21 [facility])  Mobility Inpatient CMS 0-100% Score: 0 (09/02/21 1159)  Mobility Inpatient CMS G-Code Modifier : CH (09/02/21 1159)   Goals  Short term goals  Time Frame for Short term goals: 10  Short term goal 1: Pt to perform bed mobility and functional transfer independently  Short term goal 2: Ambulate 600ft with no AD independently  Short term goal 3: Ascend/descend 9 stairs with one rail to simulate home environment to reach second level SBA  Short term goal 4: Demonstrate independent performance of cardiac HEP prior to discharge  Patient Goals   Patient goals :  To go home    Plan    Plan  Times per week: 6-7x/week; 1-2/day  Times per day: Daily  Current Treatment Recommendations: Strengthening, Transfer Training, Endurance Training, Patient/Caregiver Education & Training, Gait Training, Balance Training, Stair training, Functional Mobility Training  Safety Devices  Type of devices: Left in chair, Call light within reach, Gait belt, Nurse notified  Restraints  Initially in place: No     Therapy Time   Individual Concurrent Group Co-treatment   Time In 0923         Time Out 0946         Minutes 23         Timed Code Treatment Minutes: 23 Minutes   . Treatment performed by Student PTA under the supervision of co-signing PTA who agrees with all treatment and documentation. Tyshawn Lentz PTA. Zoran LACKEYA.

## 2021-09-02 NOTE — PROGRESS NOTES
[facility]   Progress Note                 Date:   9/2/2021  Patient name:  Chava Johansen  Date of admission:  8/30/2021  5:52 AM  MRN:   [id number]  YOB: 1955  PCP:    ISI Cai CNP    Reason for Admission: CAD in native artery [I25.10]      Subjective:       Clinical Changes / Abnormalities:     Patient seen and examined in room. No acute events overnight. Remained hemodynamically stable and afebrile. Labs, vitals, & tele reviewed. Working with PT      Medications:   Scheduled Meds:    fluticasone  2 puff Inhalation BID    furosemide  20 mg IntraVENous BID    metoclopramide  10 mg Oral TID AC    insulin lispro  0-3 Units SubCUTAneous Nightly    insulin lispro  0-6 Units SubCUTAneous TID WC    tamsulosin  0.4 mg Oral Daily    sodium chloride flush  10 mL IntraVENous 2 times per day    aspirin  81 mg Oral Daily    clopidogrel  75 mg Oral Daily    amiodarone  200 mg Oral TID    mupirocin   Nasal BID    polyethylene glycol  17 g Oral Daily    [Held by provider] metoprolol tartrate  25 mg Oral BID    atorvastatin  80 mg Oral Nightly    pantoprazole  40 mg Oral Daily    pantoprazole  40 mg IntraVENous Daily    And    sodium chloride (PF)  10 mL IntraVENous Daily    insulin glargine  0.15 Units/kg SubCUTAneous Nightly     Continuous Infusions:    sodium chloride      norepinephrine Stopped (08/31/21 0000)    dextrose      EPINEPHrine       CBC:   Recent Labs     08/31/21  0409 09/01/21  0420 09/02/21  0523   WBC 11.8* 11.1 9.4   HGB 10.1* 10.3* 10.8*   PLT See Reflexed IPF Result See Reflexed IPF Result 144     BMP:    Recent Labs     08/31/21  0409 09/01/21  0420 09/02/21  0523    134* 135   K 4.1 4.1 3.8   * 100 99   CO2 23 22 25   BUN 14 14 15   CREATININE 0.68* 0.73 0.80   GLUCOSE 102* 136* 100*     Hepatic: No results for input(s): AST, ALT, ALB, BILITOT, ALKPHOS in the last 72 hours.   Troponin: No results for input(s): TROPHS in the last 72 hours. BNP: No results for input(s): BNP in the last 72 hours. Lipids: No results for input(s): CHOL, HDL in the last 72 hours. Invalid input(s): LDLCALCU  INR:   Recent Labs     08/31/21  0409 09/01/21  0420 09/02/21  0523   INR 1.1 1.0 1.0       Objective:   Vitals: /67   Pulse 80   Temp 98.2 °F (36.8 °C) (Oral)   Resp 16   Ht 5' 10.5\" (1.791 m)   Wt 175 lb 11.3 oz (79.7 kg)   SpO2 96%   BMI 24.85 kg/m²   General appearance: Alert. No acute distress. HEENT: Head: Normal, normocephalic, atraumatic. Neck: no JVD, trachea midline  Lungs: Clear to auscultation bilaterally, no wheeze or crackles  Heart: Regular rate and rhythm, S1, S2 normal, no murmur  Abdomen: Soft, non-tender  Extremities: No edema  Neurologic: No focal neurologic deficits    Cath 8/23/21  Angiographic Findings      Cardiac Arteries and Lesion Findings     LMCA: Normal 0% stenosis.     LAD: Mid area at D2 75% stenosis  D2: Ostial 60% stenosis     LCx: Mid 100% stenosis in stent  OM: seen filling by collateral     RCA: Dominant  Ostial 50% stenosis  PDA: minimal disease  PLV1: Ostial 90% stenosis     Ramus: upper branch with no disease  Lower branch 80% proximal area      Coronary Tree      Dominance: Right     LV Analysis  LV function assessed as:Normal.  Ejection Fraction  +----------------------------------------------------------------------+---+  ! Method                                                                ! EF%! +----------------------------------------------------------------------+---+  ! LV gram                                                               !55 !   Procedure Summary      Multivessel CAD   Preserved LV function      Recommendations      CV surgery consult   If patient not best candidate for CABG will consider LAD and PLV branch   intervention    Echo 8/23/21  Summary  Left ventricle is normal in size, global left ventricular systolic function  is low normal, calculated ejection fraction is 52% (by 3D Heart Model.)  Normal Calculated global L. strain of -16.2 %. Aortic valve is sclerotic but opens well. Trivial tricuspid regurgitation. Assessment:     1. 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 HTN  3. HLD  4. Preserved EF    Plan / Recommendations:   1. Continue ASA & plavix  2. Continue amiodarone 200 TID, per CT surgery. Decrease to BID on discharge  3. Continue atorvastatin 80 mg daily  4. BP has been stable. Will add low dose BB.   5. Discharge planning per CTS      Electronically signed on 09/02/21 at 11:10 AM by:    ISI Sutton CNP,

## 2021-09-03 ENCOUNTER — CARE COORDINATION (OUTPATIENT)
Dept: OTHER | Facility: CLINIC | Age: 66
End: 2021-09-03

## 2021-09-03 NOTE — CARE COORDINATION
[facility] Transitions Initial Follow Up Call    Call within 2 business days of discharge: Yes    Patient: Machelle Vora Patient : 1955   MRN: [id number]  Reason for Admission: CAD in native artery; CABG x3  Discharge Date: 21 RARS: Readmission Risk Score: 13      Last Discharge [facility]       Complaint Diagnosis Description Type Department Provider    21  CAD in native artery . .. Admission (Discharged) Betzy Crane 1 MD PIA Montero attempted to reach patient for introduction to Associate Care Management related to Care Transitions. HIPAA compliant message left requesting a return phone call. Will attempt to outreach patient again.        Follow Up  Future Appointments   Date Time Provider [place]   2021 11:30 AM SCHEDULE, P SV CT SURG SV CT Surg [facility]   2022  8:00 AM ISI Nicholas - [facility] Lourdes Olsen RN

## 2021-09-07 ENCOUNTER — TELEPHONE (OUTPATIENT)
Dept: PRIMARY CARE CLINIC | Age: 66
End: 2021-09-07

## 2021-09-07 ENCOUNTER — CARE COORDINATION (OUTPATIENT)
Dept: OTHER | Facility: CLINIC | Age: 66
End: 2021-09-07

## 2021-09-07 DIAGNOSIS — J45.40 MODERATE PERSISTENT ASTHMA WITHOUT COMPLICATION: ICD-10-CM

## 2021-09-07 RX ORDER — ALBUTEROL SULFATE 2.5 MG/3ML
2.5 SOLUTION RESPIRATORY (INHALATION) EVERY 6 HOURS PRN
Qty: 90 EACH | Refills: 3 | Status: SHIPPED | OUTPATIENT
Start: 2021-09-07

## 2021-09-07 ASSESSMENT — ENCOUNTER SYMPTOMS
RESPIRATORY NEGATIVE: 1
EYES NEGATIVE: 1
ALLERGIC/IMMUNOLOGIC NEGATIVE: 1
GASTROINTESTINAL NEGATIVE: 1

## 2021-09-07 NOTE — TELEPHONE ENCOUNTER
Pt wife called asking if Rx for Nebulizer solution has been sent to [address] [place] [address], pt wife state she called and they have not gotten the Rx yet. Pt wife voice concern since the pharmacy closes at 5:30pm today. Pt wife would like a call back when medication is sent. Please advise.

## 2021-09-07 NOTE — TELEPHONE ENCOUNTER
Spoke with pt wife and informed her that medication was sent to pharmacy, pt wife verbalized understanding of writer's instructions.

## 2021-09-07 NOTE — DISCHARGE SUMMARY
[address] [facility] Cardiothoracic Surgery  Discharge Summary    Patient's Name/Date of Birth: Ze Calderon / [birth year] (68 y.o.)    Admission Date: 8/30/2021  5:52 AM  Discharge Date: 9/2/2021  3:00 PM  Discharge Physician: Dr. Ольга Franks  Discharge Unit: [address] [facility]  Discharge condition: stable  Disposition: Home      Reason For Admission:     HPI: Ze Calderon is a 78 y.o.  male who presented outpatient for bypass surgery after recent cardiac catheterization noted multivessel CAD. After surgery patient had uneventful postop recovery. Chest tubes were removed on appropriate day when there was no effusions noted. Acute blood loss anemia was due to surgery. Patient had 3+ bowel movements. Patient noted to have a lot of air noted in the stomach which we will continue to monitor. Patient had no pain with deep palpation to left quad. On day of discharge we decided it was safe for patient to go home and he will follow-up with cardiothoracic surgery in office    Pt does not need [facility]-pt has 2 family members who are RNs    Procedures completed in hospital:CABG x3 with skeletonized LIMA to LAD, reverse saphenous vein  graft 1 to D2, and reverse saphenous vein graft 2 to inferior branch of  ramus. Review of Systems   Constitutional: Negative. HENT: Negative. Eyes: Negative. Respiratory: Negative. Cardiovascular: Negative. Gastrointestinal: Negative. Endocrine: Negative. Genitourinary: Negative. Musculoskeletal: Negative. Allergic/Immunologic: Negative. Neurological: Negative. Hematological: Negative. Psychiatric/Behavioral: Negative. Physical Exam:  Vitals:    09/02/21 1200   BP: 107/61   Pulse: 86   Resp: 16   Temp: 98.6 °F (37 °C)   SpO2: 96%     Weight: Weight: 175 lb 11.3 oz (79.7 kg)    Weight: 155 lb 6.8 oz (70.5 kg)    No intake/output data recorded. General: alert and oriented to person, place and time, well-developed and well-nourished, in no acute distress.  Up in chair, No apparent distress. Heart:Normal S1 and S2.  Regular rhythm. No murmurs, gallops, or rubs. Lungs: clear to auscultation bilaterally  Abdomen: soft, non tender, non distended, BSx4  Extremities: negative  Wounds: clean and dry, healing appropriately. Past Medical History:   Diagnosis Date    Allergic rhinitis     Asthma     inhaler    CAD (coronary artery disease)     MI 7/26/2006; 2 stents.   Cardiology: Dr Wilson Code care maintenance     PCP:  Rakesh ALVES   last seen 1/2021    Hypertension     Inguinal hernia     Irritant contact dermatitis due to plants, except food 08/17/2017    MI (myocardial infarction) [facility]) 2006     Past Surgical History:   Procedure Laterality Date    CARDIAC CATHETERIZATION  08/23/2021    COLONOSCOPY  2004    CORONARY ARTERY BYPASS GRAFT N/A 8/30/2021    CABG CORONARY ARTERY BYPASS X3, ON PUMP, MAGNOLIA PER ANESTHESIA, ENDO RADIAL HARVEST performed by Oliva Lyman MD at [address] [place] [address]- not sure of side     Allergies   Allergen Reactions    Cephalexin Other (See Comments)     Pt unsure of reaction     Family History   Problem Relation Age of Onset    Stroke Father 67     Social History     Socioeconomic History    Marital status:      Spouse name: Not on file    Number of children: Not on file    Years of education: Not on file    Highest education level: Not on file   Occupational History    Not on file   Tobacco Use    Smoking status: Never Smoker    Smokeless tobacco: Never Used   Substance and Sexual Activity    Alcohol use: No    Drug use: No    Sexual activity: Not on file   Other Topics Concern    Not on file   Social History Narrative    Not on file     Social Determinants of Health     Financial Resource Strain:     Difficulty of Paying Living Expenses:    Food Insecurity:     Worried About Running Out of Food in the Last Year:     [address] in the Last Year:    Transportation Needs:     Lack of Transportation (Medical):  Lack of Transportation (Non-Medical):    Physical Activity:     Days of Exercise per Week:     Minutes of Exercise per Session:    Stress:     Feeling of Stress :    Social Connections:     Frequency of Communication with Friends and Family:     Frequency of Social Gatherings with Friends and Family:     Attends Jain Services:     Active Member of Clubs or Organizations:     Attends Club or Organization Meetings:     Marital Status:    Intimate Partner Violence:     Fear of Current or Ex-Partner:     Emotionally Abused:     Physically Abused:     Sexually Abused:           Medication List      START taking these medications    amiodarone 200 MG tablet  Commonly known as: CORDARONE  Take 1 tablet by mouth 2 times daily  Notes to patient: Last taken 9/2/21 9 am and at 2 pm     clopidogrel 75 MG tablet  Commonly known as: PLAVIX  Take 1 tablet by mouth daily  Notes to patient: Last taken 9/2/21 at 9 am     metoprolol succinate 25 MG extended release tablet  Commonly known as: TOPROL XL  Take 0.5 tablets by mouth nightly  Notes to patient: Last dose 8/30 at 7 am     oxyCODONE-acetaminophen 5-325 MG per tablet  Commonly known as: PERCOCET  Take 1 tablet by mouth every 8 hours as needed for Pain for up to 7 days. tamsulosin 0.4 MG capsule  Commonly known as: FLOMAX  Take 1 capsule by mouth daily  Notes to patient: Last dose 9/2/21 at 9 am        CHANGE how you take these medications    atorvastatin 80 MG tablet  Commonly known as: LIPITOR  Take 1 tablet by mouth nightly  What changed:   · medication strength  · how much to take  · when to take this  Notes to patient: Last dose 9/1/21 at 9 pm        CONTINUE taking these medications    aspirin 81 MG EC tablet  Notes to patient: Last dose 9/2/21 at 9 am     fluticasone 110 MCG/ACT inhaler  Commonly known as: Flovent HFA  INHALE 2 PUFFS INTO THE LUNGS TWICE DAILY.      fluticasone 50 MCG/ACT nasal NEGATIVE 08/27/2021    GLUCOSEU NEGATIVE 08/27/2021       Problem List Items Addressed This Visit        Cardiothoracic    CAD in native artery - Primary    Relevant Medications    atorvastatin (LIPITOR) 80 MG tablet    amiodarone (CORDARONE) 200 MG tablet    metoprolol succinate (TOPROL XL) 25 MG extended release tablet    Other Relevant Orders    [facility]      Other Visit Diagnoses     Post-op pain        Relevant Medications    oxyCODONE-acetaminophen (PERCOCET) 5-325 MG per tablet            Discharge Plan:  Follow up with CT Surgery  in 1 week. Call office at [phone] for any problems. Follow up with PCP and cardiology in 1-2 weeks. Patient was discharged on Aspirin, Plavix,  BB, and Statin therapy per protocol.       ISI Tristan - NP, CNP  Phone: [phone]

## 2021-09-07 NOTE — CARE COORDINATION
[facility] Transitions Initial Follow Up Call    Call within 2 business days of discharge: Yes    Patient: Jessica Dawson Patient : 1955   MRN: [id number]  Reason for Admission: CAD in native artery; CABG x3  Discharge Date: 21 RARS: Readmission Risk Score: 13      Last Discharge [facility]       Complaint Diagnosis Description Type Department Provider    21  CAD in native artery . .. Admission (Discharged) Marlo Angry 1 Yoanna Bach MD           Transitions of Care Initial Call    Was this an external facility discharge? No     Challenges to be reviewed by the provider   Additional needs identified to be addressed with provider: No  none             Method of communication with provider : none      Advance Care Planning:   Does patient have an Advance Directive: not on file. Was this a readmission? No  Patient stated reason for admission: CABG x3  Patients top risk factors for readmission: medical condition-CAD in native artery; CABG x3    Care Transition Nurse (CTN) contacted the patient by telephone to perform post hospital discharge assessment. Verified name and  with patient as identifiers. Provided introduction to self, and explanation of the CTN role. CTN reviewed discharge instructions, medical action plan and red flags with patient who verbalized understanding. Patient given an opportunity to ask questions and does not have any further questions or concerns at this time. Were discharge instructions available to patient? Yes. Reviewed appropriate site of care based on symptoms and resources available to patient including: PCP, Specialist, Benefits related nurse triage line, Urgent care clinics, [facility], When to call 911 and [employer]. The patient agrees to contact the PCP office for questions related to their healthcare. Medication reconciliation was performed with patient, who verbalizes understanding of administration of home medications.  Advised obtaining a 90-day supply of all daily and as-needed medications. Covid Risk Education     Educated patient about risk for severe COVID-19 due to risk factors according to CDC guidelines. ACM reviewed discharge instructions, medical action plan and red flag symptoms with the patient who verbalized understanding. Discussed COVID vaccination status: Yes. Education provided on COVID-19 vaccination as appropriate. Discussed exposure protocols and quarantine with CDC Guidelines. Patient was given an opportunity to verbalize any questions and concerns and agrees to contact ACM or health care provider for questions related to their healthcare. Reviewed and educated patient on any new and changed medications related to discharge diagnosis. Was patient discharged with a pulse oximeter? no Discussed and confirmed pulse oximeter discharge instructions and when to notify provider or seek emergency care. ACM provided contact information. Plan for follow-up call in 5-7 days based on severity of symptoms and risk factors. Plan for next call: symptom management-any chest pain, shortness of breath, palpitations, swelling and follow up appointment-9/8/2021     Summary note: patient states he is doing well at home. Patient's wife is a RN and his son is as well. They are helping him at home. Patient declined home care. Patient denies chest pain, shortness of breath, palpitations, swelling. Patient called to schedule with cardiac rehab and is waiting for a call back with an appointment. Patient has a f/u appt tomorrow with Dr. Kojo Noonan.        Care Transitions 24 Hour Call    Schedule Follow Up Appointment with PCP: Completed  Do you have any ongoing symptoms?: No  Do you have a copy of your discharge instructions?: Yes  Do you have all of your prescriptions and are they filled?: Yes  Have you been contacted by a [facility] Pharmacist?: No  Have you scheduled your follow up appointment?: Yes  How are you going to get to your appointment?: Car - family or friend to transport  Were you discharged with any Home Care or Post Acute Services: No  Do you feel like you have everything you need to keep you well at home?: Yes  Care Transitions Interventions         Follow Up  Future Appointments   Date Time Provider [place] Combs   9/8/2021 11:30 AM SCHEDULE, P SV CT SURG SV CT Surg [facility]   1/6/2022  8:00 AM ISI Post - [facility] Philis Phalen, RN

## 2021-09-07 NOTE — TELEPHONE ENCOUNTER
Needs a new order for rx albuterol nebulizer solution 2.5 mg per 3 ml. 0.083%. The old rx  in . He has not used the machine recently, he thought he needed to start up again with the fall weather coming this  when he noticed the rx was . [facility] pharmacy please if rx is approved.

## 2021-09-08 ENCOUNTER — OFFICE VISIT (OUTPATIENT)
Dept: CARDIOTHORACIC SURGERY | Age: 66
End: 2021-09-08

## 2021-09-08 VITALS
WEIGHT: 166 LBS | RESPIRATION RATE: 18 BRPM | BODY MASS INDEX: 23.77 KG/M2 | SYSTOLIC BLOOD PRESSURE: 121 MMHG | DIASTOLIC BLOOD PRESSURE: 74 MMHG | HEIGHT: 70 IN | OXYGEN SATURATION: 99 % | HEART RATE: 79 BPM | TEMPERATURE: 98.1 F

## 2021-09-08 DIAGNOSIS — R60.9 PITTING EDEMA: Primary | ICD-10-CM

## 2021-09-08 DIAGNOSIS — E87.6 HYPOKALEMIA: ICD-10-CM

## 2021-09-08 PROCEDURE — 99024 POSTOP FOLLOW-UP VISIT: CPT | Performed by: NURSE PRACTITIONER

## 2021-09-08 RX ORDER — FUROSEMIDE 20 MG/1
20 TABLET ORAL DAILY
Qty: 60 TABLET | Refills: 0 | Status: SHIPPED | OUTPATIENT
Start: 2021-09-08 | End: 2022-01-24

## 2021-09-08 RX ORDER — POTASSIUM CHLORIDE 20 MEQ/1
20 TABLET, EXTENDED RELEASE ORAL DAILY
Qty: 90 TABLET | Refills: 1 | Status: SHIPPED | OUTPATIENT
Start: 2021-09-08 | End: 2022-01-24

## 2021-09-08 NOTE — PROGRESS NOTES
[facility]  Office Visit      Subjective:  Mr. Hany Ernst is a 78 y.o. male s/p CABG at [facility] with Dr. Smith Husbands he feels well today. Pain is controlled, he is taking appropriate STS medications requiring narcotics for pain relief. Denies chest pain or shortness of breath. Increasing activity as tolerated. Appetite is  returning to normal. Bowel movements are regular. Denies fever, chills, or signs of infection at incision sites. Plan of care reviewed and questions answered. Chest xray reviewed. Mr. Hany Ernst is recovering at home with his wife who is an RN and other family members who are RNs    Physical Exam  Vitals:  Vitals:    09/08/21 1132   BP: 121/74   Pulse: 79   Resp: 18   Temp: 98.1 °F (36.7 °C)   SpO2: 99%       General: Alert and Oriented x3. Ambulatory. No apparent distress. Chest:  No abnormality. Equal and symmetric expansion with respiration. Lungs:  Clear to auscultation. Cardiac:  Regular rate and rhythm without murmurs, rubs or gallops. Abdomen:  Soft, non-tender, normoactive bowel sounds. Extremities: 2+ lower extremity pitting edema  Psychiatric: Mood and affect are appropriate.   Sternal incision is clean dry intact with no sign discharge or bleeding however site is clean dry intact no sign discharge or bleeding    Current Medications:    Current Outpatient Medications:     albuterol (PROVENTIL) (2.5 MG/3ML) 0.083% nebulizer solution, Take 3 mLs by nebulization every 6 hours as needed for Wheezing or Shortness of Breath, Disp: 90 each, Rfl: 3    atorvastatin (LIPITOR) 80 MG tablet, Take 1 tablet by mouth nightly, Disp: 30 tablet, Rfl: 3    amiodarone (CORDARONE) 200 MG tablet, Take 1 tablet by mouth 2 times daily, Disp: 30 tablet, Rfl: 0    clopidogrel (PLAVIX) 75 MG tablet, Take 1 tablet by mouth daily, Disp: 30 tablet, Rfl: 3    metoprolol succinate (TOPROL XL) 25 MG extended release tablet, Take 0.5 tablets by mouth nightly, Disp: 30 tablet, Rfl: 3    oxyCODONE-acetaminophen (PERCOCET) 5-325 MG per tablet, Take 1 tablet by mouth every 8 hours as needed for Pain for up to 7 days. , Disp: 21 tablet, Rfl: 0    tamsulosin (FLOMAX) 0.4 MG capsule, Take 1 capsule by mouth daily, Disp: 30 capsule, Rfl: 3    aspirin 81 MG EC tablet, Take 81 mg by mouth daily, Disp: , Rfl:     pantoprazole (PROTONIX) 40 MG tablet, Take 1 tablet by mouth daily, Disp: 90 tablet, Rfl: 3    fluticasone (FLOVENT HFA) 110 MCG/ACT inhaler, INHALE 2 PUFFS INTO THE LUNGS TWICE DAILY. , Disp: 36 g, Rfl: 3    LORATADINE PO, Take by mouth, Disp: , Rfl:     fluticasone (FLONASE) 50 MCG/ACT nasal spray, USE 2 SPRAYS NASALLY AS DIRECTED ONCE DAILY. , Disp: 48 g, Rfl: 5    ibuprofen (ADVIL;MOTRIN) 800 MG tablet, Take 1 tablet by mouth every 6 hours as needed for Pain, Disp: 90 tablet, Rfl: 0    Past Surgical History:   Procedure Laterality Date    CARDIAC CATHETERIZATION  08/23/2021    COLONOSCOPY  2004    CORONARY ARTERY BYPASS GRAFT N/A 8/30/2021    CABG CORONARY ARTERY BYPASS X3, ON PUMP, MAGNOLIA PER ANESTHESIA, ENDO RADIAL HARVEST performed by Jamie Chen MD at [address] [place] [address]- not sure of side       Social Hx: reports that he has never smoked. He has never used smokeless tobacco.    Assessment & Plan:   Started lasix 20mg daily with K replacement.   Please continue to wear compression stockings  Please continue to follow-up with urology regarding you are while you are inpatient  Follow-up with cardiology to assist with further medical management  Follow-up with PCP to assist with further medical management    [address] [facility], APRN - NP,APRN CNP

## 2021-09-15 ENCOUNTER — CARE COORDINATION (OUTPATIENT)
Dept: OTHER | Facility: CLINIC | Age: 66
End: 2021-09-15

## 2021-09-15 NOTE — CARE COORDINATION
[facility] Transitions Follow Up Call    9/15/2021    Patient: Isael Rojo  Patient : 1955   MRN: [id number]  Reason for Admission: CAD in native artery; CABG x3  Discharge Date: 21 RARS: Readmission Risk Score: 13         Care Transitions Follow Up Call    Needs to be reviewed by the provider   Additional needs identified to be addressed with provider: No  none             Method of communication with provider : none      Care Transition Nurse (CTN) contacted the patient by telephone to follow up after admission on 2021-2021. Verified name and  with patient as identifiers. Addressed changes since last contact: patient states he is doing well. saw CT surgeon on 2021. patient can now be turned over to the cardiologist. patient f/u appt with cardio is 2021. patient starts cardiac rehab on 2021. patient denies chest pain, palpitations, and swelling. states he did have some swelling in ankle and discussed this with MD. patient is wearing compression stockings. swelling has now decreased. patient is walking 2-2.5 miles per day. patient f/u with urologist also last week. patient is urinating and having a bowel movement now and urologist states symptoms have resolved so no further follow up needed at this time. Discussed follow-up appointments. If no appointment was previously scheduled, appointment scheduling offered: Yes. Is follow up appointment scheduled within 7 days of discharge? Yes. Advance Care Planning:   Does patient have an Advance Directive: not on file. CTN reviewed discharge instructions, medical action plan and red flags with patient and discussed any barriers to care and/or understanding of plan of care after discharge. Discussed appropriate site of care based on symptoms and resources available to patient including: PCP, Specialist, Benefits related nurse triage line, Urgent care clinics, [facility], When to call 911 and [employer]. The patient agrees to contact the PCP office for questions related to their healthcare. Patients top risk factors for readmission: medical condition-s/p CABG x3  Interventions to address risk factors: Obtained and reviewed discharge summary and/or continuity of care documents      Non-[facility] follow up appointment(s):     CTN provided contact information for future needs. Plan for follow-up call in 10-14 days based on severity of symptoms and risk factors. Plan for next call: follow up appointment-cardiologist on 9/21/2021 and cardiac rehab on 9/23/2021            Care Transitions Subsequent and Final Call    Subsequent and Final Calls  Do you have any ongoing symptoms?: No  Have your medications changed?: No  Do you have any questions related to your medications?: No  Do you currently have any active services?: No  Do you have any needs or concerns that I can assist you with?: No  Identified Barriers: None  Care Transitions Interventions  Other Interventions:            Follow Up  Future Appointments   Date Time Provider [facility] Combs   9/22/2021 12:00 PM [address] [place] [address] - [facility] [address]   1/6/2022  8:00 AM ISI House - CNP [place] JARRET Lombardi RN

## 2021-09-22 ENCOUNTER — HOSPITAL ENCOUNTER (OUTPATIENT)
Dept: CARDIAC REHAB | Age: 66
Setting detail: THERAPIES SERIES
Discharge: HOME | End: 2021-09-22
Payer: COMMERCIAL

## 2021-09-22 ENCOUNTER — APPOINTMENT (OUTPATIENT)
Dept: CARDIAC REHAB | Age: 66
End: 2021-09-22
Payer: COMMERCIAL

## 2021-09-22 VITALS — WEIGHT: 154.4 LBS | HEIGHT: 70 IN | BODY MASS INDEX: 22.1 KG/M2

## 2021-09-22 PROCEDURE — 93798 PHYS/QHP OP CAR RHAB W/ECG: CPT

## 2021-09-22 ASSESSMENT — PATIENT HEALTH QUESTIONNAIRE - PHQ9
SUM OF ALL RESPONSES TO PHQ QUESTIONS 1-9: 0
SUM OF ALL RESPONSES TO PHQ QUESTIONS 1-9: 0

## 2021-09-27 ENCOUNTER — HOSPITAL ENCOUNTER (OUTPATIENT)
Dept: CARDIAC REHAB | Age: 66
Setting detail: THERAPIES SERIES
Discharge: HOME | End: 2021-09-27
Payer: COMMERCIAL

## 2021-09-27 VITALS — WEIGHT: 156 LBS | BODY MASS INDEX: 22.38 KG/M2

## 2021-09-27 PROCEDURE — 93798 PHYS/QHP OP CAR RHAB W/ECG: CPT

## 2021-09-28 ENCOUNTER — HOSPITAL ENCOUNTER (OUTPATIENT)
Dept: CARDIAC REHAB | Age: 66
Setting detail: THERAPIES SERIES
End: 2021-09-28
Payer: COMMERCIAL

## 2021-09-29 ENCOUNTER — APPOINTMENT (OUTPATIENT)
Dept: CARDIAC REHAB | Age: 66
End: 2021-09-29
Payer: COMMERCIAL

## 2021-10-01 ENCOUNTER — CARE COORDINATION (OUTPATIENT)
Dept: OTHER | Facility: CLINIC | Age: 66
End: 2021-10-01

## 2021-10-01 NOTE — CARE COORDINATION
[facility] Transitions Follow Up Call    10/1/2021    Patient: Jose Miguel Rodriguez  Patient : 1955   MRN: [id number]  Reason for Admission: CAD in native artery; CABG x3  Discharge Date: 21 RARS: Readmission Risk Score: 13         ACM attempted to reach patient for follow up call regarding cardiac rehab and f/u appt with cardiology. HIPAA compliant message left requesting a return phone call at patients convenience. Will continue to follow.        Follow Up  Future Appointments   Date Time Provider [facility] Combs   10/4/2021 10:00 AM STC CARD REHAB RM 10 [facility]   10/6/2021 10:00 AM STC CARD REHAB RM 10 STCZ [facility]   10/11/2021 10:00 AM STC CARD REHAB RM 10 STCZ CARD[facility]   10/13/2021 10:00 AM STC CARD REHAB RM 10 STCZ CARD[facility]   10/18/2021 10:00 AM STC CARD REHAB RM 10 STCZ CARDI[facility]   10/20/2021 10:00 AM STC CARD REHAB RM 10 STCZ CARD[facility]   10/25/2021 10:00 AM STC CARD REHAB RM 10 STCZ [facility]ARD[facility]   10/27/2021 10:00 AM STC CARD REHAB RM 10 STCZ [facility]ARD[facility]   2021 10:00 AM STC CARD REHAB RM 10 STCZ [facility]ARD[facility]   11/3/2021 10:00 AM STC CARD REHAB RM 10 STCZ CARD[facility]   2021 10:00 AM STC CARD REHAB RM 10 STCZ CARD[facility]   11/10/2021 10:00 AM STC CARD REHAB RM 10 STCZ [facility]ARD[facility]   11/15/2021 10:00 AM STC CARD REHAB RM 10 STCZ [facility]   2021 10:00 AM STC CARD REHAB RM 10 STCZ [facility]   2021 10:00 AM STC CARD REHAB RM 10 [facility]   2021 10:00 AM STC CARD REHAB RM 10 [facility]   2021 10:00 AM STC CARD REHAB RM 10 [facility]   2021 10:00 AM STC CARD REHAB RM 10 [facility]   2021 10:00 AM STC CARD REHAB RM 10 [facility]   2021 10:00 AM STC CARD REHAB RM 10 [facility]   2021 10:00 AM STC CARD REHAB RM 10 [facility] CARDIAC [facility]   12/15/2021 10:00 AM STC CARD REHAB RM 10 [facility] CARDIAC [facility]   12/20/2021 10:00 AM STC CARD REHAB RM 10 [facility] CARD[facility]   12/22/2021 10:00 AM STC CARD REHAB RM 10 [facility]ARD[facility]   12/27/2021 10:00 AM STC CARD REHAB RM 10 [facility]   12/29/2021 10:00 AM STC CARD REHAB RM 10 [facility]   1/3/2022 10:00 AM STC CARD REHAB RM 10 [facility]   1/5/2022 10:00 AM STC CARD REHAB RM 10 [facility]Z [facility]   1/6/2022  8:00 AM ISI [facility]   1/10/2022 10:00 AM STC CARD REHAB RM 10 STCZ CARDI[facility]   1/12/2022 10:00 AM STC CARD REHAB RM 10 STCZ CARD[facility]   1/17/2022 10:00 AM STC CARD REHAB RM 10 [facility]ARD[facility]   1/19/2022 10:00 AM STC CARD REHAB RM 10 ST[facility]Z [facility]ARD[facility]   1/24/2022 10:00 AM STC CARD REHAB RM 10 ST[facility]Z [facility]ARD[facility]   1/26/2022 10:00 AM STC CARD REHAB RM [facility]

## 2021-10-04 ENCOUNTER — HOSPITAL ENCOUNTER (OUTPATIENT)
Dept: CARDIAC REHAB | Age: 66
Setting detail: THERAPIES SERIES
Discharge: HOME | End: 2021-10-04
Payer: COMMERCIAL

## 2021-10-04 VITALS — WEIGHT: 160.2 LBS | BODY MASS INDEX: 22.99 KG/M2

## 2021-10-04 PROCEDURE — 93798 PHYS/QHP OP CAR RHAB W/ECG: CPT

## 2021-10-05 ENCOUNTER — CARE COORDINATION (OUTPATIENT)
Dept: OTHER | Facility: CLINIC | Age: 66
End: 2021-10-05

## 2021-10-05 NOTE — CARE COORDINATION
[facility] Transitions Follow Up Call    10/5/2021    Patient: Darío Ross  Patient : 1955   MRN: [id number]  Reason for Admission: CAD in native artery; CABG x3  Discharge Date: 21 RARS: Readmission Risk Score: 13         ACM attempted to reach patient for follow up call. HIPAA compliant message left requesting a return phone call at patients convenience. Will continue to follow. This is the second attempt. Lost to follow up letter sent via [employer].       Follow Up  Future Appointments   Date Time Provider [place] [facility]   10/6/2021 10:00 AM STC CARD REHAB RM 10 [facility]   10/11/2021 10:00 AM STC CARD REHAB RM 10 [facility]Z [facility]   10/13/2021 10:00 AM STC CARD REHAB RM 10 ST[facility]Z [facility]   10/18/2021 10:00 AM STC CARD REHAB RM 10 [facility]   10/20/2021 10:00 AM STC CARD REHAB RM 10 STCZ [facility]   10/25/2021 10:00 AM STC CARD REHAB RM 10 STCZ [facility]   10/27/2021 10:00 AM STC CARD REHAB RM 10 [facility]Z [facility]   2021 10:00 AM STC CARD REHAB RM 10 [facility]Z [facility]   11/3/2021 10:00 AM STC CARD REHAB RM 10 [facility]Z [facility]   2021 10:00 AM STC CARD REHAB RM 10 [facility]Z [facility]   11/10/2021 10:00 AM STC CARD REHAB RM 10 [facility]Z [facility]   11/15/2021 10:00 AM STC CARD REHAB RM 10 ST[facility]Z [facility]   2021 10:00 AM STC CARD REHAB RM 10 STC[facility]   2021 10:00 AM STC CARD REHAB RM 10 [facility]   2021 10:00 AM STC CARD REHAB RM 10 [facility]Z [facility]   2021 10:00 AM STC CARD REHAB RM 10 [facility]Z [facility]   2021 10:00 AM STC CARD REHAB RM 10 ST[facility]Z [facility]   2021 10:00 AM STC CARD REHAB RM 10 ST[facility]Z [facility]   2021 10:00 AM STC CARD REHAB RM 10 ST[facility]Z [facility]   2021 10:00 AM STC CARD REHAB RM 10 ST[facility]Z [facility]   12/15/2021 10:00 AM STC CARD REHAB RM [address] 12/20/2021 10:00 AM STC CARD REHAB RM 10 ST[facility]Z [facility]   12/22/2021 10:00 AM STC CARD REHAB RM 10 ST[facility]Z [facility]   12/27/2021 10:00 AM STC CARD REHAB RM 10 ST[facility]Z [facility]   12/29/2021 10:00 AM STC CARD REHAB RM 10 ST[facility]Z [facility]   1/3/2022 10:00 AM STC CARD REHAB RM 10 ST[facility]Z [facility]   1/5/2022 10:00 AM STC CARD REHAB RM 10 ST[facility]Z [facility]   1/6/2022  8:00 AM Michelle Subramanian, APRN - CNP [facility]   1/10/2022 10:00 AM STC CARD REHAB RM 10 STCZ [facility]   1/12/2022 10:00 AM STC CARD REHAB RM 10 [facility]   1/17/2022 10:00 AM STC CARD REHAB RM 10 ST[facility]Z [facility]   1/19/2022 10:00 AM STC CARD REHAB RM 10 STCZ [facility]   1/24/2022 10:00 AM STC CARD REHAB RM 10 STCZ [facility]   1/26/2022 10:00 AM STC CARD REHAB RM [address] [facility]

## 2021-10-06 ENCOUNTER — HOSPITAL ENCOUNTER (OUTPATIENT)
Dept: CARDIAC REHAB | Age: 66
Setting detail: THERAPIES SERIES
Discharge: HOME | End: 2021-10-06
Payer: COMMERCIAL

## 2021-10-06 VITALS — WEIGHT: 161.2 LBS | BODY MASS INDEX: 23.13 KG/M2

## 2021-10-06 PROCEDURE — 93798 PHYS/QHP OP CAR RHAB W/ECG: CPT

## 2021-10-11 ENCOUNTER — HOSPITAL ENCOUNTER (OUTPATIENT)
Dept: CARDIAC REHAB | Age: 66
Setting detail: THERAPIES SERIES
Discharge: HOME | End: 2021-10-11
Payer: COMMERCIAL

## 2021-10-11 VITALS — BODY MASS INDEX: 23.14 KG/M2 | WEIGHT: 161.3 LBS

## 2021-10-11 PROCEDURE — 93798 PHYS/QHP OP CAR RHAB W/ECG: CPT

## 2021-10-11 NOTE — PROGRESS NOTES
Goal reviewed with patient. He states he is feeling a little stronger and that he will be off on OCT 13, 18 and 20th. Returning on OCT 25, 2021.

## 2021-10-12 ENCOUNTER — CARE COORDINATION (OUTPATIENT)
Dept: OTHER | Facility: CLINIC | Age: 66
End: 2021-10-12

## 2021-10-12 NOTE — CARE COORDINATION
Care Transitions Outreach Attempt    Call within 2 business days of discharge: No   Attempted to reach patient for transitions of care follow up. Unable to reach patient. Patient: Mata Jeffries Patient : 1955 MRN: [id number]    Last Discharge [facility]       Complaint Diagnosis Description Type Department Provider    21  CAD in native artery . .. Admission (Discharged) Teresa Hayden 1 Eula Epps MD            Was this an external facility discharge?  No     Noted following upcoming appointments from discharge chart review:   [facility] follow up appointment(s):   Future Appointments   Date Time Provider [place]   10/25/2021 10:00 AM STC CARD REHAB RM [address]   10/27/2021 10:00 AM STC CARD REHAB RM 10 STCZ [facility]   2021 10:00 AM STC CARD REHAB RM 10 STCZ [facility]   11/3/2021 10:00 AM STC CARD REHAB RM 10 STCZ [facility]   2021 10:00 AM STC CARD REHAB RM 10 STCZ [facility]   11/10/2021 10:00 AM STC CARD REHAB RM 10 ST[facility]Z [facility]   11/15/2021 10:00 AM STC CARD REHAB RM 10 [facility]Z [facility]   2021 10:00 AM STC CARD REHAB RM 10 STCZ [facility]   2021 10:00 AM STC CARD REHAB RM 10 [facility]   2021 10:00 AM STC CARD REHAB RM 10 [facility]Z [facility]   2021 10:00 AM STC CARD REHAB RM 10 ST[facility]Z [facility]   2021 10:00 AM STC CARD REHAB RM 10 ST[facility]Z [facility]   2021 10:00 AM STC CARD REHAB RM 10 [facility]Z [facility]   2021 10:00 AM STC CARD REHAB RM 10 ST[facility]Z [facility]   2021 10:00 AM STC CARD REHAB RM 10 ST[facility]Z [facility]   12/15/2021 10:00 AM STC CARD REHAB RM 10 ST[facility]Z [facility]   2021 10:00 AM STC CARD REHAB RM 10 ST[facility]Z [facility]   2021 10:00 AM STC CARD REHAB RM 10 [facility]   2021 10:00 AM STC CARD REHAB RM 10 STCZ [facility]   2021 10:00 AM STC CARD REHAB RM [address]   1/3/2022 10:00 AM STC CARD REHAB RM 10 STCZ [facility]   1/5/2022 10:00 AM STC CARD REHAB RM 10 STCZ [facility]   1/6/2022  8:00 AM ISI Weinberg - CNP [facility]   1/10/2022 10:00 AM STC CARD REHAB RM 10 STCZ [facility]   1/12/2022 10:00 AM STC CARD REHAB RM 10 STCZ [facility]   1/17/2022 10:00 AM STC CARD REHAB RM 10 STCZ [facility]   1/19/2022 10:00 AM STC CARD REHAB RM 10 ST[facility]   1/24/2022 10:00 AM STC CARD REHAB RM 10 ST[facility]Z [facility]   1/26/2022 10:00 AM STC CARD REHAB RM 10 STCZ [facility]   1/31/2022  9:00 AM STC CARD REHAB RM 10 STCZ [facility]   2/2/2022  9:00 AM STC CARD REHAB RM 10 STCZ [facility]   2/7/2022  9:00 AM STC CARD REHAB RM 10 [facility]Z [facility] [place]     Non-[facility] follow up appointment(s):

## 2021-10-13 ENCOUNTER — APPOINTMENT (OUTPATIENT)
Dept: CARDIAC REHAB | Age: 66
End: 2021-10-13
Payer: COMMERCIAL

## 2021-10-18 ENCOUNTER — APPOINTMENT (OUTPATIENT)
Dept: CARDIAC REHAB | Age: 66
End: 2021-10-18
Payer: COMMERCIAL

## 2021-10-20 ENCOUNTER — APPOINTMENT (OUTPATIENT)
Dept: CARDIAC REHAB | Age: 66
End: 2021-10-20
Payer: COMMERCIAL

## 2021-10-22 ENCOUNTER — TELEPHONE (OUTPATIENT)
Dept: VASCULAR SURGERY | Age: 66
End: 2021-10-22

## 2021-10-22 NOTE — TELEPHONE ENCOUNTER
----- Message from ISI Vela CNP sent at 10/22/2021 11:01 AM EDT -----  Bring him in for an appointment on Wednesday next week. Thanks,  ISI Vela CNP  ----- Message -----  From: Jerry Gamez MA  Sent: 10/22/2021  10:45 AM EDT  To: ISI Vela CNP, #    The patients wife called concerned a little about the patients chest following a CABGx3. She said that he has a wire sticking out that looks like a \"stitch\". Please advise.

## 2021-10-25 ENCOUNTER — HOSPITAL ENCOUNTER (OUTPATIENT)
Dept: CARDIAC REHAB | Age: 66
Setting detail: THERAPIES SERIES
Discharge: HOME | End: 2021-10-25
Payer: COMMERCIAL

## 2021-10-25 VITALS — BODY MASS INDEX: 23.89 KG/M2 | HEIGHT: 70 IN | WEIGHT: 166.9 LBS

## 2021-10-25 PROCEDURE — 93798 PHYS/QHP OP CAR RHAB W/ECG: CPT

## 2021-10-27 ENCOUNTER — HOSPITAL ENCOUNTER (OUTPATIENT)
Dept: CARDIAC REHAB | Age: 66
Setting detail: THERAPIES SERIES
Discharge: HOME | End: 2021-10-27
Payer: COMMERCIAL

## 2021-10-27 VITALS — WEIGHT: 167.8 LBS | BODY MASS INDEX: 24.08 KG/M2

## 2021-10-27 PROCEDURE — 93798 PHYS/QHP OP CAR RHAB W/ECG: CPT

## 2021-11-01 ENCOUNTER — HOSPITAL ENCOUNTER (OUTPATIENT)
Dept: CARDIAC REHAB | Age: 66
Setting detail: THERAPIES SERIES
Discharge: HOME | End: 2021-11-01
Payer: COMMERCIAL

## 2021-11-01 VITALS — WEIGHT: 165.6 LBS | BODY MASS INDEX: 23.76 KG/M2

## 2021-11-01 PROCEDURE — 93798 PHYS/QHP OP CAR RHAB W/ECG: CPT

## 2021-11-03 ENCOUNTER — HOSPITAL ENCOUNTER (OUTPATIENT)
Dept: CARDIAC REHAB | Age: 66
Setting detail: THERAPIES SERIES
Discharge: HOME | End: 2021-11-03
Payer: COMMERCIAL

## 2021-11-03 VITALS — BODY MASS INDEX: 23.63 KG/M2 | WEIGHT: 164.7 LBS

## 2021-11-03 PROCEDURE — 93798 PHYS/QHP OP CAR RHAB W/ECG: CPT

## 2021-11-08 ENCOUNTER — HOSPITAL ENCOUNTER (OUTPATIENT)
Dept: CARDIAC REHAB | Age: 66
Setting detail: THERAPIES SERIES
Discharge: HOME | End: 2021-11-08
Payer: COMMERCIAL

## 2021-11-08 VITALS — WEIGHT: 161.7 LBS | BODY MASS INDEX: 23.2 KG/M2

## 2021-11-08 PROCEDURE — 93798 PHYS/QHP OP CAR RHAB W/ECG: CPT

## 2021-11-10 ENCOUNTER — HOSPITAL ENCOUNTER (OUTPATIENT)
Dept: CARDIAC REHAB | Age: 66
Setting detail: THERAPIES SERIES
Discharge: HOME | End: 2021-11-10
Payer: COMMERCIAL

## 2021-11-10 VITALS — WEIGHT: 162.3 LBS | BODY MASS INDEX: 23.29 KG/M2

## 2021-11-10 PROCEDURE — 93798 PHYS/QHP OP CAR RHAB W/ECG: CPT

## 2021-11-15 ENCOUNTER — HOSPITAL ENCOUNTER (OUTPATIENT)
Dept: CARDIAC REHAB | Age: 66
Setting detail: THERAPIES SERIES
Discharge: HOME | End: 2021-11-15
Payer: COMMERCIAL

## 2021-11-15 VITALS — BODY MASS INDEX: 23.36 KG/M2 | WEIGHT: 162.8 LBS

## 2021-11-15 PROCEDURE — 93798 PHYS/QHP OP CAR RHAB W/ECG: CPT

## 2021-11-17 ENCOUNTER — HOSPITAL ENCOUNTER (OUTPATIENT)
Dept: CARDIAC REHAB | Age: 66
Setting detail: THERAPIES SERIES
Discharge: HOME | End: 2021-11-17
Payer: COMMERCIAL

## 2021-11-22 ENCOUNTER — APPOINTMENT (OUTPATIENT)
Dept: CARDIAC REHAB | Age: 66
End: 2021-11-22
Payer: COMMERCIAL

## 2021-11-24 ENCOUNTER — APPOINTMENT (OUTPATIENT)
Dept: CARDIAC REHAB | Age: 66
End: 2021-11-24
Payer: COMMERCIAL

## 2021-11-29 ENCOUNTER — HOSPITAL ENCOUNTER (OUTPATIENT)
Dept: CARDIAC REHAB | Age: 66
Setting detail: THERAPIES SERIES
Discharge: HOME | End: 2021-11-29
Payer: COMMERCIAL

## 2021-11-29 VITALS — BODY MASS INDEX: 23.24 KG/M2 | WEIGHT: 162 LBS

## 2021-11-29 PROCEDURE — 93798 PHYS/QHP OP CAR RHAB W/ECG: CPT

## 2021-11-29 NOTE — PROGRESS NOTES
REVIEWED ITP AND GOAL WITH PATIENT \" I FEEL GREAT AND I CAN DO SO MUCH MORE NOW\"  \" MY BACK IS ALSO MUCH BETTER \"

## 2021-12-01 ENCOUNTER — HOSPITAL ENCOUNTER (OUTPATIENT)
Dept: CARDIAC REHAB | Age: 66
Setting detail: THERAPIES SERIES
Discharge: HOME | End: 2021-12-01
Payer: COMMERCIAL

## 2021-12-01 VITALS — BODY MASS INDEX: 23.33 KG/M2 | WEIGHT: 162.6 LBS

## 2021-12-01 PROCEDURE — 93798 PHYS/QHP OP CAR RHAB W/ECG: CPT

## 2021-12-06 ENCOUNTER — HOSPITAL ENCOUNTER (OUTPATIENT)
Dept: CARDIAC REHAB | Age: 66
Setting detail: THERAPIES SERIES
Discharge: HOME | End: 2021-12-06
Payer: COMMERCIAL

## 2021-12-08 ENCOUNTER — HOSPITAL ENCOUNTER (OUTPATIENT)
Dept: CARDIAC REHAB | Age: 66
Setting detail: THERAPIES SERIES
Discharge: HOME | End: 2021-12-08
Payer: COMMERCIAL

## 2021-12-08 VITALS — WEIGHT: 163.4 LBS | BODY MASS INDEX: 23.45 KG/M2

## 2021-12-08 PROCEDURE — 93798 PHYS/QHP OP CAR RHAB W/ECG: CPT

## 2021-12-13 ENCOUNTER — HOSPITAL ENCOUNTER (OUTPATIENT)
Dept: CARDIAC REHAB | Age: 66
Setting detail: THERAPIES SERIES
Discharge: HOME | End: 2021-12-13
Payer: COMMERCIAL

## 2021-12-13 VITALS — BODY MASS INDEX: 23.39 KG/M2 | WEIGHT: 163 LBS

## 2021-12-13 PROCEDURE — 93798 PHYS/QHP OP CAR RHAB W/ECG: CPT

## 2021-12-15 ENCOUNTER — HOSPITAL ENCOUNTER (OUTPATIENT)
Dept: CARDIAC REHAB | Age: 66
Setting detail: THERAPIES SERIES
Discharge: HOME | End: 2021-12-15
Payer: COMMERCIAL

## 2021-12-15 VITALS — WEIGHT: 164.3 LBS | BODY MASS INDEX: 23.57 KG/M2

## 2021-12-15 PROCEDURE — 93798 PHYS/QHP OP CAR RHAB W/ECG: CPT

## 2021-12-15 NOTE — PROGRESS NOTES
REVIEWED GOAL WITH PATIENT. \" I AM EXERCISING MORE AND MORE, BUT MY BACK IS STILL SORE A BIT NOW\"    WILL BE OFF UNTIL DEC.  28TH

## 2021-12-20 ENCOUNTER — APPOINTMENT (OUTPATIENT)
Dept: CARDIAC REHAB | Age: 66
End: 2021-12-20
Payer: COMMERCIAL

## 2021-12-22 ENCOUNTER — APPOINTMENT (OUTPATIENT)
Dept: CARDIAC REHAB | Age: 66
End: 2021-12-22
Payer: COMMERCIAL

## 2021-12-27 ENCOUNTER — HOSPITAL ENCOUNTER (OUTPATIENT)
Dept: CARDIAC REHAB | Age: 66
Setting detail: THERAPIES SERIES
Discharge: HOME | End: 2021-12-27
Payer: COMMERCIAL

## 2021-12-29 ENCOUNTER — HOSPITAL ENCOUNTER (OUTPATIENT)
Dept: CARDIAC REHAB | Age: 66
Setting detail: THERAPIES SERIES
Discharge: HOME | End: 2021-12-29
Payer: COMMERCIAL

## 2022-01-03 ENCOUNTER — HOSPITAL ENCOUNTER (OUTPATIENT)
Dept: CARDIAC REHAB | Age: 67
Setting detail: THERAPIES SERIES
Discharge: HOME | End: 2022-01-03
Payer: COMMERCIAL

## 2022-01-05 ENCOUNTER — HOSPITAL ENCOUNTER (OUTPATIENT)
Dept: CARDIAC REHAB | Age: 67
Setting detail: THERAPIES SERIES
Discharge: HOME | End: 2022-01-05
Payer: COMMERCIAL

## 2022-01-05 ENCOUNTER — TELEPHONE (OUTPATIENT)
Dept: PRIMARY CARE CLINIC | Age: 67
End: 2022-01-05

## 2022-01-05 NOTE — TELEPHONE ENCOUNTER
Pt states he is scheduled for 1/6/22 at 8am. Pt states he is full vaccinated w/ booster and came down with cold symptoms about 2 days ago. Pt states his symptoms are runny nose, HA, body aches, congestion & sore throat. Would you like pt to reschedule?

## 2022-01-10 ENCOUNTER — HOSPITAL ENCOUNTER (OUTPATIENT)
Dept: CARDIAC REHAB | Age: 67
Setting detail: THERAPIES SERIES
Discharge: HOME | End: 2022-01-10
Payer: COMMERCIAL

## 2022-01-10 VITALS — WEIGHT: 163 LBS | BODY MASS INDEX: 27.16 KG/M2 | HEIGHT: 65 IN

## 2022-01-10 PROCEDURE — 93798 PHYS/QHP OP CAR RHAB W/ECG: CPT

## 2022-01-12 ENCOUNTER — HOSPITAL ENCOUNTER (OUTPATIENT)
Dept: CARDIAC REHAB | Age: 67
Setting detail: THERAPIES SERIES
Discharge: HOME | End: 2022-01-12
Payer: COMMERCIAL

## 2022-01-12 VITALS — BODY MASS INDEX: 27.34 KG/M2 | WEIGHT: 164.3 LBS

## 2022-01-12 PROCEDURE — 93798 PHYS/QHP OP CAR RHAB W/ECG: CPT

## 2022-01-17 ENCOUNTER — HOSPITAL ENCOUNTER (OUTPATIENT)
Dept: CARDIAC REHAB | Age: 67
Setting detail: THERAPIES SERIES
Discharge: HOME | End: 2022-01-17
Payer: COMMERCIAL

## 2022-01-17 VITALS — BODY MASS INDEX: 27.24 KG/M2 | WEIGHT: 163.7 LBS

## 2022-01-17 PROCEDURE — 93798 PHYS/QHP OP CAR RHAB W/ECG: CPT

## 2022-01-18 ENCOUNTER — HOSPITAL ENCOUNTER (OUTPATIENT)
Age: 67
Discharge: HOME | End: 2022-01-18
Payer: COMMERCIAL

## 2022-01-18 LAB
ABSOLUTE EOS #: 0.1 K/UL (ref 0–0.4)
ABSOLUTE IMMATURE GRANULOCYTE: ABNORMAL K/UL (ref 0–0.3)
ABSOLUTE LYMPH #: 1.9 K/UL (ref 1–4.8)
ABSOLUTE MONO #: 0.5 K/UL (ref 0.1–1.3)
ALBUMIN SERPL-MCNC: 4.6 G/DL (ref 3.5–5.2)
ALBUMIN SERPL-MCNC: 4.6 G/DL (ref 3.5–5.2)
ALBUMIN/GLOBULIN RATIO: ABNORMAL (ref 1–2.5)
ALBUMIN/GLOBULIN RATIO: ABNORMAL (ref 1–2.5)
ALP BLD-CCNC: 95 U/L (ref 40–129)
ALP BLD-CCNC: 95 U/L (ref 40–129)
ALT SERPL-CCNC: 20 U/L (ref 5–41)
ALT SERPL-CCNC: 20 U/L (ref 5–41)
ANION GAP SERPL CALCULATED.3IONS-SCNC: 10 MMOL/L (ref 9–17)
ANION GAP SERPL CALCULATED.3IONS-SCNC: 10 MMOL/L (ref 9–17)
AST SERPL-CCNC: 27 U/L
AST SERPL-CCNC: 27 U/L
BASOPHILS # BLD: 1 % (ref 0–2)
BASOPHILS ABSOLUTE: 0 K/UL (ref 0–0.2)
BILIRUB SERPL-MCNC: 0.8 MG/DL (ref 0.3–1.2)
BILIRUB SERPL-MCNC: 0.8 MG/DL (ref 0.3–1.2)
BUN BLDV-MCNC: 17 MG/DL (ref 8–23)
BUN BLDV-MCNC: 17 MG/DL (ref 8–23)
BUN/CREAT BLD: ABNORMAL (ref 9–20)
BUN/CREAT BLD: ABNORMAL (ref 9–20)
CALCIUM SERPL-MCNC: 9.6 MG/DL (ref 8.6–10.4)
CALCIUM SERPL-MCNC: 9.6 MG/DL (ref 8.6–10.4)
CHLORIDE BLD-SCNC: 104 MMOL/L (ref 98–107)
CHLORIDE BLD-SCNC: 104 MMOL/L (ref 98–107)
CHOLESTEROL/HDL RATIO: 1.7
CHOLESTEROL/HDL RATIO: 1.7
CHOLESTEROL: 133 MG/DL
CHOLESTEROL: 133 MG/DL
CO2: 25 MMOL/L (ref 20–31)
CO2: 25 MMOL/L (ref 20–31)
CREAT SERPL-MCNC: 0.95 MG/DL (ref 0.7–1.2)
CREAT SERPL-MCNC: 0.95 MG/DL (ref 0.7–1.2)
DIFFERENTIAL TYPE: ABNORMAL
EOSINOPHILS RELATIVE PERCENT: 2 % (ref 0–4)
GFR AFRICAN AMERICAN: >60 ML/MIN
GFR AFRICAN AMERICAN: >60 ML/MIN
GFR NON-AFRICAN AMERICAN: >60 ML/MIN
GFR NON-AFRICAN AMERICAN: >60 ML/MIN
GFR SERPL CREATININE-BSD FRML MDRD: ABNORMAL ML/MIN/{1.73_M2}
GLUCOSE BLD-MCNC: 104 MG/DL (ref 70–99)
GLUCOSE BLD-MCNC: 104 MG/DL (ref 70–99)
HCT VFR BLD CALC: 42.4 % (ref 41–53)
HDLC SERPL-MCNC: 79 MG/DL
HDLC SERPL-MCNC: 79 MG/DL
HEMOGLOBIN: 14.3 G/DL (ref 13.5–17.5)
IMMATURE GRANULOCYTES: ABNORMAL %
LDL CHOLESTEROL: 46 MG/DL (ref 0–130)
LDL CHOLESTEROL: 46 MG/DL (ref 0–130)
LYMPHOCYTES # BLD: 36 % (ref 24–44)
MCH RBC QN AUTO: 31.3 PG (ref 26–34)
MCHC RBC AUTO-ENTMCNC: 33.7 G/DL (ref 31–37)
MCV RBC AUTO: 93 FL (ref 80–100)
MONOCYTES # BLD: 10 % (ref 1–7)
NRBC AUTOMATED: ABNORMAL PER 100 WBC
PDW BLD-RTO: 14 % (ref 11.5–14.9)
PLATELET # BLD: 251 K/UL (ref 150–450)
PLATELET ESTIMATE: ABNORMAL
PMV BLD AUTO: 8.3 FL (ref 6–12)
POTASSIUM SERPL-SCNC: 4.9 MMOL/L (ref 3.7–5.3)
POTASSIUM SERPL-SCNC: 4.9 MMOL/L (ref 3.7–5.3)
PROSTATE SPECIFIC ANTIGEN: 2 UG/L
PROSTATE SPECIFIC ANTIGEN: 2.05 UG/L
RBC # BLD: 4.56 M/UL (ref 4.5–5.9)
RBC # BLD: ABNORMAL 10*6/UL
SEG NEUTROPHILS: 51 % (ref 36–66)
SEGMENTED NEUTROPHILS ABSOLUTE COUNT: 2.7 K/UL (ref 1.3–9.1)
SODIUM BLD-SCNC: 139 MMOL/L (ref 135–144)
SODIUM BLD-SCNC: 139 MMOL/L (ref 135–144)
TOTAL PROTEIN: 7 G/DL (ref 6.4–8.3)
TOTAL PROTEIN: 7 G/DL (ref 6.4–8.3)
TRIGL SERPL-MCNC: 38 MG/DL
TRIGL SERPL-MCNC: 38 MG/DL
VLDLC SERPL CALC-MCNC: NORMAL MG/DL (ref 1–30)
VLDLC SERPL CALC-MCNC: NORMAL MG/DL (ref 1–30)
WBC # BLD: 5.3 K/UL (ref 3.5–11)
WBC # BLD: ABNORMAL 10*3/UL

## 2022-01-18 PROCEDURE — G0103 PSA SCREENING: HCPCS

## 2022-01-18 PROCEDURE — 80053 COMPREHEN METABOLIC PANEL: CPT

## 2022-01-18 PROCEDURE — 85025 COMPLETE CBC W/AUTO DIFF WBC: CPT

## 2022-01-18 PROCEDURE — 80061 LIPID PANEL: CPT

## 2022-01-18 PROCEDURE — 36415 COLL VENOUS BLD VENIPUNCTURE: CPT

## 2022-01-19 ENCOUNTER — HOSPITAL ENCOUNTER (OUTPATIENT)
Dept: CARDIAC REHAB | Age: 67
Setting detail: THERAPIES SERIES
Discharge: HOME | End: 2022-01-19
Payer: COMMERCIAL

## 2022-01-19 VITALS — HEIGHT: 71 IN | BODY MASS INDEX: 23.1 KG/M2 | WEIGHT: 165 LBS

## 2022-01-19 PROCEDURE — 93798 PHYS/QHP OP CAR RHAB W/ECG: CPT

## 2022-01-24 ENCOUNTER — OFFICE VISIT (OUTPATIENT)
Dept: PRIMARY CARE CLINIC | Age: 67
End: 2022-01-24
Payer: COMMERCIAL

## 2022-01-24 ENCOUNTER — APPOINTMENT (OUTPATIENT)
Dept: CARDIAC REHAB | Age: 67
End: 2022-01-24
Payer: COMMERCIAL

## 2022-01-24 VITALS
HEART RATE: 58 BPM | RESPIRATION RATE: 14 BRPM | OXYGEN SATURATION: 99 % | HEIGHT: 70 IN | SYSTOLIC BLOOD PRESSURE: 116 MMHG | BODY MASS INDEX: 24.37 KG/M2 | WEIGHT: 170.2 LBS | DIASTOLIC BLOOD PRESSURE: 78 MMHG

## 2022-01-24 DIAGNOSIS — Z00.00 ANNUAL PHYSICAL EXAM: Primary | ICD-10-CM

## 2022-01-24 DIAGNOSIS — I10 PRIMARY HYPERTENSION: ICD-10-CM

## 2022-01-24 DIAGNOSIS — Z95.1 STATUS POST CORONARY ARTERY BYPASS GRAFT: ICD-10-CM

## 2022-01-24 DIAGNOSIS — J45.40 MODERATE PERSISTENT ASTHMA WITHOUT COMPLICATION: ICD-10-CM

## 2022-01-24 DIAGNOSIS — K21.9 GASTROESOPHAGEAL REFLUX DISEASE, UNSPECIFIED WHETHER ESOPHAGITIS PRESENT: ICD-10-CM

## 2022-01-24 DIAGNOSIS — I25.10 CAD IN NATIVE ARTERY: ICD-10-CM

## 2022-01-24 PROCEDURE — 99397 PER PM REEVAL EST PAT 65+ YR: CPT | Performed by: NURSE PRACTITIONER

## 2022-01-24 RX ORDER — PANTOPRAZOLE SODIUM 40 MG/1
40 TABLET, DELAYED RELEASE ORAL DAILY
Qty: 90 TABLET | Refills: 3 | Status: SHIPPED | OUTPATIENT
Start: 2022-01-24

## 2022-01-24 RX ORDER — FLUTICASONE PROPIONATE 110 UG/1
AEROSOL, METERED RESPIRATORY (INHALATION)
Qty: 36 G | Refills: 3 | Status: SHIPPED | OUTPATIENT
Start: 2022-01-24

## 2022-01-24 SDOH — ECONOMIC STABILITY: FOOD INSECURITY: WITHIN THE PAST 12 MONTHS, YOU WORRIED THAT YOUR FOOD WOULD RUN OUT BEFORE YOU GOT MONEY TO BUY MORE.: NEVER TRUE

## 2022-01-24 SDOH — ECONOMIC STABILITY: FOOD INSECURITY: WITHIN THE PAST 12 MONTHS, THE FOOD YOU BOUGHT JUST DIDN'T LAST AND YOU DIDN'T HAVE MONEY TO GET MORE.: NEVER TRUE

## 2022-01-24 ASSESSMENT — PATIENT HEALTH QUESTIONNAIRE - PHQ9
SUM OF ALL RESPONSES TO PHQ QUESTIONS 1-9: 0
SUM OF ALL RESPONSES TO PHQ QUESTIONS 1-9: 0
2. FEELING DOWN, DEPRESSED OR HOPELESS: 0
1. LITTLE INTEREST OR PLEASURE IN DOING THINGS: 0
SUM OF ALL RESPONSES TO PHQ9 QUESTIONS 1 & 2: 0
SUM OF ALL RESPONSES TO PHQ QUESTIONS 1-9: 0
SUM OF ALL RESPONSES TO PHQ QUESTIONS 1-9: 0

## 2022-01-24 ASSESSMENT — ENCOUNTER SYMPTOMS
SINUS PRESSURE: 0
NAUSEA: 0
SORE THROAT: 0
TROUBLE SWALLOWING: 0
BLOOD IN STOOL: 0
DIARRHEA: 0
VOMITING: 0
ABDOMINAL PAIN: 0
COUGH: 0
CONSTIPATION: 0
SHORTNESS OF BREATH: 0
WHEEZING: 0

## 2022-01-24 ASSESSMENT — SOCIAL DETERMINANTS OF HEALTH (SDOH): HOW HARD IS IT FOR YOU TO PAY FOR THE VERY BASICS LIKE FOOD, HOUSING, MEDICAL CARE, AND HEATING?: NOT HARD AT ALL

## 2022-01-24 NOTE — PROGRESS NOTES
[address] PRIMARY CARE  . [address]  [address] [address]  Dept: [phone]  Dept Fax: [phone]    Ryanne Garcia is a 78 y.o. male who presentstoday for his medical conditions/complaints as noted below. Ryanne Garcia is c/o of  Chief Complaint   Patient presents with    Annual Exam    Health Maintenance     Declined Colonoscopy       HPI:     Here today for annual physical  Since his last visit he has had CABG in August  Reports was having a routine heart cath with his cardiologist and found to have more significant blockages  Has been going to cardiac rehab twice a week since his surgery  States he feels \"great\"  Has recovered without complication  Reports has been following healthy diet with lean proteins, fruits and vegetables  Hypertension  This is a chronic problem. The current episode started more than 1 year ago. The problem is controlled. Pertinent negatives include no chest pain, headaches, palpitations, peripheral edema or shortness of breath. Past treatments include beta blockers. The current treatment provides significant improvement. There are no compliance problems. Hypertensive end-organ damage includes CAD/MI. There is no history of CVA. Hemoglobin A1C (%)   Date Value   08/27/2021 5.4             ( goal A1C is < 7)   No results found for: LABMICR  LDL Cholesterol (mg/dL)   Date Value   01/18/2022 46   01/18/2022 46   01/30/2020 86       (goal LDL is <100)   AST (U/L)   Date Value   01/18/2022 27     ALT (U/L)   Date Value   01/18/2022 20     BUN (mg/dL)   Date Value   01/18/2022 17     BP Readings from Last 3 Encounters:   01/24/22 116/78   10/27/21 134/81   09/08/21 121/74          ([id number])    Past Medical History:   Diagnosis Date    Allergic rhinitis     Asthma     inhaler    CAD (coronary artery disease)     MI 7/26/2006; 2 stents.   Cardiology: Dr Petersen Medicine care maintenance     PCP:  Amol SNOWDEN-MARGO last seen 1/2021    Hypertension     Inguinal hernia     Irritant contact dermatitis due to plants, except food 08/17/2017    MI ([facility]ocardial infarction[facility] 2006      Past Surgical History:   Procedure Laterality Date    CARDIAC CATHETERIZATION  08/23/2021    COLONOSCOPY  2004    CORONARY ARTERY BYPASS GRAFT N/A 8/30/2021    CABG CORONARY ARTERY BYPASS X3, ON PUMP, MAGNOLIA PER ANESTHESIA, ENDO RADIAL HARVEST performed by Lencho Hedrick MD at [address] [place] [address]- not sure of side       Family History   Problem Relation Age of Onset    Stroke Father 67          Social History     Tobacco Use    Smoking status: Never Smoker    Smokeless tobacco: Never Used   Substance Use Topics    Alcohol use: No      Current Outpatient Medications   Medication Sig Dispense Refill    fluticasone (FLOVENT HFA) 110 MCG/ACT inhaler INHALE 2 PUFFS INTO THE LUNGS TWICE DAILY. 36 g 3    pantoprazole (PROTONIX) 40 MG tablet Take 1 tablet by mouth daily 90 tablet 3    albuterol (PROVENTIL) (2.5 MG/3ML) 0.083% nebulizer solution Take 3 mLs by nebulization every 6 hours as needed for Wheezing or Shortness of Breath 90 each 3    atorvastatin (LIPITOR) 80 MG tablet Take 1 tablet by mouth nightly 30 tablet 3    clopidogrel (PLAVIX) 75 MG tablet Take 1 tablet by mouth daily 30 tablet 3    metoprolol succinate (TOPROL XL) 25 MG extended release tablet Take 0.5 tablets by mouth nightly 30 tablet 3    tamsulosin (FLOMAX) 0.4 MG capsule Take 1 capsule by mouth daily 30 capsule 3    aspirin 81 MG EC tablet Take 81 mg by mouth daily      LORATADINE PO Take by mouth      fluticasone (FLONASE) 50 MCG/ACT nasal spray USE 2 SPRAYS NASALLY AS DIRECTED ONCE DAILY. 48 g 5     No current facility-administered medications for this visit.      Allergies   Allergen Reactions    Cephalexin Other (See Comments)     Pt unsure of reaction       Health Maintenance   Topic Date Due    Colon cancer screen colonoscopy  Never done    Shingles Vaccine (1 of 2) Never done    Flu vaccine (1) 01/24/2023 (Originally 9/1/2021)    Depression Monitoring  09/22/2022    Pneumococcal 65+ years Vaccine (2 of 2 - PPSV23) 11/17/2022    Lipid screen  01/18/2023    DTaP/Tdap/Td vaccine (2 - Td or Tdap) 11/17/2026    COVID-19 Vaccine  Completed    Hepatitis A vaccine  Aged Out    Hepatitis B vaccine  Aged Out    Hib vaccine  Aged Out    Meningococcal (ACWY) vaccine  Aged Out    Hepatitis C screen  Discontinued       Subjective:      Review of Systems   Constitutional: Negative for activity change, appetite change, chills, fatigue, fever and unexpected weight change. HENT: Negative for congestion, ear pain, hearing loss, sinus pressure, sore throat and trouble swallowing. Eyes: Negative for visual disturbance. Respiratory: Negative for cough, shortness of breath and wheezing. Cardiovascular: Negative for chest pain, palpitations and leg swelling. Gastrointestinal: Negative for abdominal pain, blood in stool, constipation, diarrhea, nausea and vomiting. Endocrine: Negative for cold intolerance, heat intolerance, polydipsia, polyphagia and polyuria. Genitourinary: Negative for difficulty urinating, frequency, hematuria and urgency. Musculoskeletal: Negative for arthralgias and myalgias. Skin: Negative for rash. Allergic/Immunologic: Negative for environmental allergies. Neurological: Negative for dizziness, weakness, light-headedness and headaches. Psychiatric/Behavioral: Negative for confusion. The patient is not nervous/anxious. Objective:     Physical Exam  Constitutional:       Appearance: He is well-developed. HENT:      Head: Normocephalic. Eyes:      Conjunctiva/sclera: Conjunctivae normal.      Pupils: Pupils are equal, round, and reactive to light. Cardiovascular:      Rate and Rhythm: Normal rate and regular rhythm. Heart sounds: Normal heart sounds. No murmur heard.       Pulmonary: Effort: Pulmonary effort is normal.      Breath sounds: Normal breath sounds. No wheezing. Abdominal:      General: Bowel sounds are normal. There is no distension. Palpations: Abdomen is soft. Musculoskeletal:         General: Normal range of motion. Cervical back: Normal range of motion. Skin:     General: Skin is warm and dry. Neurological:      Mental Status: He is alert and oriented to person, place, and time. Psychiatric:         Behavior: Behavior normal.         Thought Content: Thought content normal.         Judgment: Judgment normal.       /78   Pulse 58   Resp 14   Ht 5' 9.5\" (1.765 m)   Wt 170 lb 3.2 oz (77.2 kg)   SpO2 99%   BMI 24.77 kg/m²     Assessment:       Diagnosis Orders   1. Annual physical exam     2. Moderate persistent asthma without complication  fluticasone (FLOVENT HFA) 110 MCG/ACT inhaler   3. Gastroesophageal reflux disease, unspecified whether esophagitis present  pantoprazole (PROTONIX) 40 MG tablet   4. CAD in native artery     5. Primary hypertension     6. Status post coronary artery bypass graft x 3, 8-2021               Plan:      Return in about 6 months (around 7/24/2022). Annual exam-reviewed recent labs from the last 6 months, he has been stable, encouraged to continue healthy diet regular exercise  Asthma-no recent exacerbations, stable with Flovent  GERD-well-controlled on PPI and with avoidance of triggers  CAD, hypertension, s/p CABG-reviewed notes, labs and diagnostics from last 6 months, he has recovered without incident from triple bypass surgery, he has been compliant with cardiac rehab and is currently involved in finishing his rehab sessions. Encouraged to continue healthy diet choices and regular exercise. He will follow-up with cardiologist as planned     Orders Placed This Encounter   Medications    fluticasone (FLOVENT HFA) 110 MCG/ACT inhaler     Sig: INHALE 2 PUFFS INTO THE LUNGS TWICE DAILY.      Dispense:  36 g Refill:  3    pantoprazole (PROTONIX) 40 MG tablet     Sig: Take 1 tablet by mouth daily     Dispense:  90 tablet     Refill:  3       Patient given educational materials - see patient instructions. Discussed use, benefit, and side effects of prescribed medications. All patientquestions answered. Pt voiced understanding. Reviewed health maintenance. Instructedto continue current medications, diet and exercise. Patient agreed with treatmentplan. Follow up as directed.      Electronicallysigned by ISI Velázquez CNP on 1/24/2022 at 12:54 PM

## 2022-01-26 ENCOUNTER — HOSPITAL ENCOUNTER (OUTPATIENT)
Dept: CARDIAC REHAB | Age: 67
Setting detail: THERAPIES SERIES
Discharge: HOME | End: 2022-01-26
Payer: COMMERCIAL

## 2022-01-31 ENCOUNTER — HOSPITAL ENCOUNTER (OUTPATIENT)
Dept: CARDIAC REHAB | Age: 67
Setting detail: THERAPIES SERIES
Discharge: HOME | End: 2022-01-31
Payer: COMMERCIAL

## 2022-01-31 VITALS — DIASTOLIC BLOOD PRESSURE: 70 MMHG | BODY MASS INDEX: 24.48 KG/M2 | WEIGHT: 168.2 LBS | SYSTOLIC BLOOD PRESSURE: 129 MMHG

## 2022-01-31 PROCEDURE — 93798 PHYS/QHP OP CAR RHAB W/ECG: CPT

## 2022-02-02 ENCOUNTER — HOSPITAL ENCOUNTER (OUTPATIENT)
Dept: CARDIAC REHAB | Age: 67
Setting detail: THERAPIES SERIES
Discharge: HOME | End: 2022-02-02
Payer: COMMERCIAL

## 2022-02-02 VITALS — WEIGHT: 167 LBS | BODY MASS INDEX: 24.31 KG/M2

## 2022-02-02 PROCEDURE — 93798 PHYS/QHP OP CAR RHAB W/ECG: CPT

## 2022-02-07 ENCOUNTER — HOSPITAL ENCOUNTER (OUTPATIENT)
Dept: CARDIAC REHAB | Age: 67
Setting detail: THERAPIES SERIES
Discharge: HOME | End: 2022-02-07
Payer: COMMERCIAL

## 2022-02-07 VITALS — BODY MASS INDEX: 24.31 KG/M2 | WEIGHT: 167 LBS

## 2022-02-07 PROCEDURE — 93798 PHYS/QHP OP CAR RHAB W/ECG: CPT

## 2022-02-09 ENCOUNTER — HOSPITAL ENCOUNTER (OUTPATIENT)
Dept: CARDIAC REHAB | Age: 67
Setting detail: THERAPIES SERIES
Discharge: HOME | End: 2022-02-09
Payer: COMMERCIAL

## 2022-02-09 VITALS — BODY MASS INDEX: 24.45 KG/M2 | WEIGHT: 168 LBS

## 2022-02-09 PROCEDURE — 93798 PHYS/QHP OP CAR RHAB W/ECG: CPT

## 2022-02-14 ENCOUNTER — HOSPITAL ENCOUNTER (OUTPATIENT)
Dept: CARDIAC REHAB | Age: 67
Setting detail: THERAPIES SERIES
Discharge: HOME | End: 2022-02-14
Payer: COMMERCIAL

## 2022-02-14 VITALS — WEIGHT: 166.4 LBS | BODY MASS INDEX: 24.22 KG/M2

## 2022-02-14 PROCEDURE — 93798 PHYS/QHP OP CAR RHAB W/ECG: CPT

## 2022-02-16 ENCOUNTER — HOSPITAL ENCOUNTER (OUTPATIENT)
Dept: CARDIAC REHAB | Age: 67
Setting detail: THERAPIES SERIES
Discharge: HOME | End: 2022-02-16
Payer: COMMERCIAL

## 2022-02-16 VITALS — WEIGHT: 167.4 LBS | BODY MASS INDEX: 24.37 KG/M2

## 2022-02-16 PROCEDURE — 93798 PHYS/QHP OP CAR RHAB W/ECG: CPT

## 2022-02-21 ENCOUNTER — HOSPITAL ENCOUNTER (OUTPATIENT)
Dept: CARDIAC REHAB | Age: 67
Setting detail: THERAPIES SERIES
Discharge: HOME | End: 2022-02-21
Payer: COMMERCIAL

## 2022-02-23 ENCOUNTER — HOSPITAL ENCOUNTER (OUTPATIENT)
Dept: CARDIAC REHAB | Age: 67
Setting detail: THERAPIES SERIES
End: 2022-02-23
Payer: COMMERCIAL

## 2022-02-28 ENCOUNTER — HOSPITAL ENCOUNTER (OUTPATIENT)
Dept: CARDIAC REHAB | Age: 67
Setting detail: THERAPIES SERIES
Discharge: HOME | End: 2022-02-28
Payer: COMMERCIAL

## 2022-02-28 VITALS — WEIGHT: 166.6 LBS | BODY MASS INDEX: 24.25 KG/M2

## 2022-02-28 PROCEDURE — 93798 PHYS/QHP OP CAR RHAB W/ECG: CPT

## 2022-02-28 NOTE — PLAN OF CARE
30  Hours []  Other []   EXERCISE PLAN   Interventions*  Education:   [x]Self Pulse   [x]Medication HR/BP   [x]Exercise Safety   [x]S/S to report   [x]RPE scale   [x]Equip. [place]. [x]Warmup/cool down   [x]Hand    [x]Home exercise encouraged   Target Goal:  Follow individual exercise Rx, aerobic exercise, 30+minutes 5x/week   Exercise Prescription  (per physician & CR staff)  Met Level 4.6       Individual Cardiac Treatment Plan - EDUCATION  Reassessment  Learning Barriers   Speech  []  Hearing  []  Vision  []  Cognitive  []  Ready to Learn [x]     Tobacco Use  []Y  [x]N  []Quit    HTN [x]Y  []N      Diabetic []Y  [x]N     Pre-cardiac test:__100___     Intervention/Education   [x]Referal to smoking cessation  [x]Encouraged smoking cessation   [x]CAD   [x]Risk factors   [x]Med Compliance   [x]Cardiac A/P   [x]Angina S/S   [x]NTG use   [x]Sexual activity    Target Goal:  Increase knowledge of risk factors     Individual Cardiac Treatment Plan - NUTRITION  NUTRITION  REASSESSMENT      Stages of Change    []Contemplation   [x]Action   []Relapse   NUTRITION ASSESSMENT   Weight Management  Weight: 166 LB [unfilled]      Height:  Ht Readings from Last 1 Encounters:   01/24/22 5' 9.5\" (1.765 m)          BMI:  Body mass index is 24.25 kg/m². Date:  Lipids:  Cholesterol (mg/dL)   Date Value   01/18/2022 133     HDL (mg/dL)   Date Value   01/18/2022 79     LDL Cholesterol (mg/dL)   Date Value   01/18/2022 46     Chol/HDL Ratio (no units)   Date Value   01/18/2022 1.7     Triglycerides (mg/dL)   Date Value   01/18/2022 38     VLDL (mg/dL)   Date Value   01/18/2022 NOT REPORTED      Cholesterol Drug Therapy:   [x]Y  []N  Why:   Diabetic:   []Y  [x]N  Education Needed    []Y  [x]N   Goal: Maintain Heart Healthy Weight. Professional Referral  Please check if needed. []Dietician Consult    [x]Nurse/Patient Ed   []Wt. Management Referral   []Other:   Goal: Understand Lipid Results; Target:  LDL below 70, HDL above 36          Individual Cardiac Treatment Plan - PYSCHOSOCIAL  PSYCHOSOCIAL  REASSESSMENT      Psychosocial test:  PHQ 9 score:____0_   Intervention/  Education  If score 5 or above:   []Psych consult   []Physician notified  If pt declined, Why?    [x]Relaxation technique   [x]S/S of depression   [x]Coping techniques   Target goal:  Decreased stress levels

## 2022-02-28 NOTE — PROGRESS NOTES
ITP UPDATED. PT STATES HE FEELS GOOD AND IS EXERCISING ON HIS TREADMILL. PT EDUCATED ON TAKING TIME FOR YOURSELF, HANDOUT GIVEN.

## 2022-03-02 ENCOUNTER — HOSPITAL ENCOUNTER (OUTPATIENT)
Dept: CARDIAC REHAB | Age: 67
Setting detail: THERAPIES SERIES
Discharge: HOME | End: 2022-03-02
Payer: COMMERCIAL

## 2022-03-02 VITALS — WEIGHT: 166.7 LBS | BODY MASS INDEX: 24.26 KG/M2

## 2022-03-02 PROCEDURE — 93798 PHYS/QHP OP CAR RHAB W/ECG: CPT

## 2022-03-02 NOTE — PROGRESS NOTES
Goals reviewed, pt states still has back pain but it isn't worse with exercising, feels he can exercise more, discussed keeping exercise at a good RPE level outside of Cardiac Rehab as well.

## 2022-03-07 ENCOUNTER — HOSPITAL ENCOUNTER (OUTPATIENT)
Dept: CARDIAC REHAB | Age: 67
Setting detail: THERAPIES SERIES
Discharge: HOME | End: 2022-03-07
Payer: COMMERCIAL

## 2022-03-07 VITALS — WEIGHT: 167.2 LBS | BODY MASS INDEX: 24.34 KG/M2

## 2022-03-07 PROCEDURE — 93798 PHYS/QHP OP CAR RHAB W/ECG: CPT

## 2022-03-09 ENCOUNTER — APPOINTMENT (OUTPATIENT)
Dept: CARDIAC REHAB | Age: 67
End: 2022-03-09
Payer: COMMERCIAL

## 2022-03-14 ENCOUNTER — APPOINTMENT (OUTPATIENT)
Dept: CARDIAC REHAB | Age: 67
End: 2022-03-14
Payer: COMMERCIAL

## 2022-03-16 ENCOUNTER — APPOINTMENT (OUTPATIENT)
Dept: CARDIAC REHAB | Age: 67
End: 2022-03-16
Payer: COMMERCIAL

## 2022-03-21 ENCOUNTER — HOSPITAL ENCOUNTER (OUTPATIENT)
Dept: CARDIAC REHAB | Age: 67
Setting detail: THERAPIES SERIES
Discharge: HOME | End: 2022-03-21
Payer: COMMERCIAL

## 2022-03-21 VITALS — BODY MASS INDEX: 23.8 KG/M2 | WEIGHT: 163.5 LBS

## 2022-03-21 PROCEDURE — 93798 PHYS/QHP OP CAR RHAB W/ECG: CPT

## 2022-03-21 NOTE — PLAN OF CARE
[facility]  Individual Treatment Plan    Patient Name:  Ether Siemens  :  1955  Age:  78 y.o. Diagnosis: CABG X 3  Date of Event: 21   Date Entered Program: 21  Physician:  Robbie Tony   History:   Past Medical History:   Diagnosis Date    Allergic rhinitis     Asthma     inhaler    CAD (coronary artery disease)     MI 2006; 2 stents. Cardiology: Dr Kelly Weems care maintenance     PCP:  Kadi SNOWDEN-MARGO   last seen 2021    Hypertension     Inguinal hernia     Irritant contact dermatitis due to plants, except food 2017    MI (myocardial infarction) ([facility] [address] 2006     Allergies:    Allergies   Allergen Reactions    Cephalexin Other (See Comments)     Pt unsure of reaction     Patient Goal: INCREASE EXERCISE AND DECREASE BACK PAIN    Amount of Minutes per piece  Week  Warm Up  Leg Arm  Leg  Arm  Leg  Arm  Duration   1 4 7 3 7 3 7 - 31   2 4 7 3 7 3 7 3 34   3 4 8 3 8 3 8 3 37   4 4 8 4 8 4 8 4 40   5 4 9 4 9 4 9 4 43   6 4 9 5 9 5 9 5 46   Max 7 4 10 5 10 5 10 5 49        Exercise Prescription:    Type of exercise:  Legs- Treadmill, Airdyne, Nustep  Arms- Free weights, Airdyne, Ergometer, Nustep                               Frequency:  2-3 times per week         Plan of Progresssion:  Amount and duration will increase every       2-3 visits          Phase 2 Cardiac Rehabilitation  Individualized Cardiac Treatment Plan    Individual Cardiac Treatment Plan - EXERCISE  EXERCISE  REASSESSMENT   Re-Assessment   Stages of Change    []Contemplation   [x]Action   []Relapse   EXERCISE ASSESSMENT   Home Exercise   [x]Yes    []No  Type:  Aerobics   []  Bicycling  []  Cardiovascular  []  Gardening  []  Hiking  []  House Cleaning   [x]  Run/Jog  []  Swim  []  Walk  [x]  Yard Work  []  Other  []     Frequency:  Daily  [x]  Q2 days  []  Q3 days  []  Biweekly  []  Irregularly  []  Other  []     Duration:   Seconds []  Minutes [x] 30+  Hours []  Other []   EXERCISE PLAN   Interventions*  Education:   [x]Self Pulse   [x]Medication HR/BP   [x]Exercise Safety   [x]S/S to report   [x]RPE scale   [x]Equip. [employer]. [x]Warmup/cool down   [x]Hand    [x]Home exercise encouraged   Target Goal:  Follow individual exercise Rx, aerobic exercise, 30+minutes 5x/week   Exercise Prescription  (per physician & CR staff)  Met Level: 5.1       Individual Cardiac Treatment Plan - EDUCATION  Reassessment  Learning Barriers   Speech  []  Hearing  []  Vision  []  Cognitive  []  Ready to Learn [x]     Tobacco Use  []Y  [x]N  []Quit    HTN [x]Y  []N      Diabetic []Y  [x]N     Pre-cardiac test:__100___     Intervention/Education   []Referal to smoking cessation  []Encouraged smoking cessation    [x]CAD   [x]Risk factors   [x]Med Compliance   [x]Cardiac A/P   [x]Angina S/S   [x]NTG use   [x]Sexual activity    Target Goal:  Increase knowledge of risk factors     Individual Cardiac Treatment Plan - NUTRITION  NUTRITION  REASSESSMENT      Stages of Change    []Contemplation   [x]Action   []Relapse   NUTRITION ASSESSMENT   Weight Management  Weight: 163.5 LB      Height:  Ht Readings from Last 1 Encounters:   01/24/22 5' 9.5\" (1.765 m)          BMI:  Body mass index is 23.8 kg/m². Date:  Lipids:  Cholesterol (mg/dL)   Date Value   01/18/2022 133     HDL (mg/dL)   Date Value   01/18/2022 79     LDL Cholesterol (mg/dL)   Date Value   01/18/2022 46     Chol/HDL Ratio (no units)   Date Value   01/18/2022 1.7     Triglycerides (mg/dL)   Date Value   01/18/2022 38     VLDL (mg/dL)   Date Value   01/18/2022 NOT REPORTED      Cholesterol Drug Therapy:   [x]Y  []N  Why:   Diabetic:   []Y  [x]N  Education Needed    []Y  [x]N   Goal: Maintain Heart Healthy Weight. Professional Referral  Please check if needed. []Dietician Consult    [x]Nurse/Patient Ed   []Wt. Management Referral   []Other:   Goal: Understand Lipid Results; Target:  LDL below 70, HDL above 40          Individual Cardiac Treatment Plan - PYSCHOSOCIAL  PSYCHOSOCIAL  REASSESSMENT      Psychosocial test:  PHQ 9 score:_0____   Intervention/  Education  If score 5 or above:   []Psych consult   []Physician notified  If pt declined, Why?    [x]Relaxation technique   [x]S/S of depression   [x]Coping techniques   Target goal:  Decreased stress levels

## 2022-03-21 NOTE — PROGRESS NOTES
GOAL REVIEWED WITH PT. PT STATES HE IS FEELING GOOD, EXERCISING ON HIS HOME TREADMILL AND FOLLOWING A LOW SODIUM LOW FAT DIET WELL. PT EDUCATED ON MEDICATION ADHERENCE, HANDOUT GIVEN.

## 2022-03-23 ENCOUNTER — HOSPITAL ENCOUNTER (OUTPATIENT)
Dept: CARDIAC REHAB | Age: 67
Setting detail: THERAPIES SERIES
Discharge: HOME | End: 2022-03-23
Payer: COMMERCIAL

## 2022-03-23 VITALS — BODY MASS INDEX: 23.61 KG/M2 | WEIGHT: 162.2 LBS

## 2022-03-23 PROCEDURE — 93798 PHYS/QHP OP CAR RHAB W/ECG: CPT

## 2022-03-28 ENCOUNTER — HOSPITAL ENCOUNTER (OUTPATIENT)
Dept: CARDIAC REHAB | Age: 67
Setting detail: THERAPIES SERIES
Discharge: HOME | End: 2022-03-28
Payer: COMMERCIAL

## 2022-03-28 NOTE — PROGRESS NOTES
Pt called to cancel all further Cardiac Rehab sessions.  States joined a gym and will no longer attend Cardiac Rehab

## 2022-03-30 ENCOUNTER — APPOINTMENT (OUTPATIENT)
Dept: CARDIAC REHAB | Age: 67
End: 2022-03-30
Payer: COMMERCIAL

## 2022-04-28 NOTE — PLAN OF CARE
BRONCHOSPASM/BRONCHOCONSTRICTION     [x]         IMPROVE AERATION/BREATH SOUNDS  [x]   ADMINISTER BRONCHODILATOR THERAPY AS APPROPRIATE  [x]   ASSESS BREATH SOUNDS  []   IMPLEMENT AEROSOL/MDI PROTOCOL  [x]   PATIENT EDUCATION AS NEEDED None

## 2022-05-02 RX ORDER — METOPROLOL SUCCINATE 25 MG/1
TABLET, EXTENDED RELEASE ORAL
Qty: 30 TABLET | Refills: 0 | OUTPATIENT
Start: 2022-05-02

## 2022-07-25 ENCOUNTER — OFFICE VISIT (OUTPATIENT)
Dept: PRIMARY CARE CLINIC | Age: 67
End: 2022-07-25
Payer: COMMERCIAL

## 2022-07-25 VITALS
WEIGHT: 163.2 LBS | RESPIRATION RATE: 15 BRPM | DIASTOLIC BLOOD PRESSURE: 80 MMHG | OXYGEN SATURATION: 98 % | HEART RATE: 57 BPM | BODY MASS INDEX: 23.75 KG/M2 | SYSTOLIC BLOOD PRESSURE: 126 MMHG

## 2022-07-25 DIAGNOSIS — I25.10 CORONARY ARTERY DISEASE INVOLVING NATIVE HEART WITHOUT ANGINA PECTORIS, UNSPECIFIED VESSEL OR LESION TYPE: Chronic | ICD-10-CM

## 2022-07-25 DIAGNOSIS — R68.84 JAW PAIN: ICD-10-CM

## 2022-07-25 DIAGNOSIS — I10 PRIMARY HYPERTENSION: Primary | ICD-10-CM

## 2022-07-25 PROCEDURE — 1123F ACP DISCUSS/DSCN MKR DOCD: CPT | Performed by: NURSE PRACTITIONER

## 2022-07-25 PROCEDURE — 99214 OFFICE O/P EST MOD 30 MIN: CPT | Performed by: NURSE PRACTITIONER

## 2022-07-25 ASSESSMENT — PATIENT HEALTH QUESTIONNAIRE - PHQ9
10. IF YOU CHECKED OFF ANY PROBLEMS, HOW DIFFICULT HAVE THESE PROBLEMS MADE IT FOR YOU TO DO YOUR WORK, TAKE CARE OF THINGS AT HOME, OR GET ALONG WITH OTHER PEOPLE: 0
SUM OF ALL RESPONSES TO PHQ QUESTIONS 1-9: 1
SUM OF ALL RESPONSES TO PHQ QUESTIONS 1-9: 1
5. POOR APPETITE OR OVEREATING: 0
6. FEELING BAD ABOUT YOURSELF - OR THAT YOU ARE A FAILURE OR HAVE LET YOURSELF OR YOUR FAMILY DOWN: 0
3. TROUBLE FALLING OR STAYING ASLEEP: 0
SUM OF ALL RESPONSES TO PHQ9 QUESTIONS 1 & 2: 0
7. TROUBLE CONCENTRATING ON THINGS, SUCH AS READING THE NEWSPAPER OR WATCHING TELEVISION: 0
8. MOVING OR SPEAKING SO SLOWLY THAT OTHER PEOPLE COULD HAVE NOTICED. OR THE OPPOSITE, BEING SO FIGETY OR RESTLESS THAT YOU HAVE BEEN MOVING AROUND A LOT MORE THAN USUAL: 0
SUM OF ALL RESPONSES TO PHQ QUESTIONS 1-9: 1
1. LITTLE INTEREST OR PLEASURE IN DOING THINGS: 0
2. FEELING DOWN, DEPRESSED OR HOPELESS: 0
SUM OF ALL RESPONSES TO PHQ QUESTIONS 1-9: 1
9. THOUGHTS THAT YOU WOULD BE BETTER OFF DEAD, OR OF HURTING YOURSELF: 0
4. FEELING TIRED OR HAVING LITTLE ENERGY: 1

## 2022-07-25 ASSESSMENT — ENCOUNTER SYMPTOMS
CONSTIPATION: 0
RHINORRHEA: 1
SORE THROAT: 0
NAUSEA: 0
BLOOD IN STOOL: 0
VOMITING: 0
SINUS PRESSURE: 0
COUGH: 0
SHORTNESS OF BREATH: 0
ABDOMINAL PAIN: 0
WHEEZING: 0
DIARRHEA: 0
TROUBLE SWALLOWING: 0

## 2022-07-25 NOTE — PROGRESS NOTES
[address] PRIMARY CARE  [facility] [address]  [address] [address]  Dept: [phone]  Dept Fax: [phone]    Leni Canada is a 78 y.o. male who presentstoday for his medical conditions/complaints as noted below. Leni Canada is c/o of  Chief Complaint   Patient presents with    Follow-up     6m follow up; Ongoing Jaw Pain    Health Maintenance     Declined Colonoscopy           HPI:     Here today for follow up  Has been feeling well, keeping active at home  Exercising regularly    He complains of jaw pain bilaterally that he feels consistently when his is chewing  He states the dentist told him \"teeth look good\", had appt a few weeks ago for his annual exam and cleaning  Dentist felt may be sinus related  However he denies significant sx, has been taking claritin daily but does not impact the jaw pain  If he does have breakthrough sx he gets runny and eyes  Denies waking with the jaw pain, has not been told he grinds his teeth    Hypertension  This is a chronic problem. The current episode started more than 1 year ago. The problem is controlled. Pertinent negatives include no chest pain, headaches, palpitations, peripheral edema or shortness of breath. Hemoglobin A1C (%)   Date Value   08/27/2021 5.4             ( goal A1C is < 7)   No results found for: LABMICR  LDL Cholesterol (mg/dL)   Date Value   01/18/2022 46   01/18/2022 46   01/30/2020 86       (goal LDL is <100)   AST (U/L)   Date Value   01/18/2022 27     ALT (U/L)   Date Value   01/18/2022 20     BUN (mg/dL)   Date Value   01/18/2022 17     BP Readings from Last 3 Encounters:   07/25/22 126/80   01/31/22 129/70   01/24/22 116/78          ([id number])    Past Medical History:   Diagnosis Date    Allergic rhinitis     Asthma     inhaler    CAD (coronary artery disease)     MI 7/26/2006; 2 stents.   Cardiology: Dr Connor Spicer care maintenance     PCP:  Casa Whitlock APRN-MARGO   last seen 1/2021 Hypertension     Inguinal hernia     Irritant contact dermatitis due to plants, except food 08/17/2017    MI (myocardial infarction) [facility]) 2006      Past Surgical History:   Procedure Laterality Date    CARDIAC CATHETERIZATION  08/23/2021    COLONOSCOPY  2004    CORONARY ARTERY BYPASS GRAFT N/A 8/30/2021    CABG CORONARY ARTERY BYPASS X3, ON PUMP, MAGNOLIA PER ANESTHESIA, ENDO RADIAL HARVEST performed by Noemi Martin MD at [address]      Inguinal- not sure of side       Family History   Problem Relation Age of Onset    Stroke Father 67          Social History     Tobacco Use    Smoking status: Never    Smokeless tobacco: Never   Substance Use Topics    Alcohol use: No      Current Outpatient Medications   Medication Sig Dispense Refill    fluticasone (FLOVENT HFA) 110 MCG/ACT inhaler INHALE 2 PUFFS INTO THE LUNGS TWICE DAILY. 36 g 3    pantoprazole (PROTONIX) 40 MG tablet Take 1 tablet by mouth daily 90 tablet 3    albuterol (PROVENTIL) (2.5 MG/3ML) 0.083% nebulizer solution Take 3 mLs by nebulization every 6 hours as needed for Wheezing or Shortness of Breath 90 each 3    atorvastatin (LIPITOR) 80 MG tablet Take 1 tablet by mouth nightly 30 tablet 3    clopidogrel (PLAVIX) 75 MG tablet Take 1 tablet by mouth daily 30 tablet 3    metoprolol succinate (TOPROL XL) 25 MG extended release tablet Take 0.5 tablets by mouth nightly 30 tablet 3    tamsulosin (FLOMAX) 0.4 MG capsule Take 1 capsule by mouth daily 30 capsule 3    aspirin 81 MG EC tablet Take 81 mg by mouth daily      LORATADINE PO Take by mouth      fluticasone (FLONASE) 50 MCG/ACT nasal spray USE 2 SPRAYS NASALLY AS DIRECTED ONCE DAILY. 48 g 5     No current facility-administered medications for this visit.      Allergies   Allergen Reactions    Cephalexin Other (See Comments)     Pt unsure of reaction       Health Maintenance   Topic Date Due    Colorectal Cancer Screen  Never done    COVID-19 Vaccine (4 - Booster for [employer] series) 02/09/2022    Shingles vaccine (2 of 2) 07/06/2022    Flu vaccine (1) 09/01/2022    Pneumococcal 65+ years Vaccine (3 - PPSV23 or PCV20) 11/17/2022    Lipids  01/18/2023    Prostate Specific Antigen (PSA) Screening or Monitoring  01/18/2023    Depression Monitoring  01/24/2023    DTaP/Tdap/Td vaccine (2 - Td or Tdap) 11/17/2026    Hepatitis A vaccine  Aged Out    Hepatitis B vaccine  Aged Out    Hib vaccine  Aged Out    Meningococcal (ACWY) vaccine  Aged Out    Hepatitis C screen  Discontinued       Subjective:      Review of Systems   Constitutional:  Negative for activity change, appetite change, chills, fatigue, fever and unexpected weight change. HENT:  Positive for rhinorrhea (intermittenlty due to alleriges). Negative for congestion, ear pain, hearing loss, sinus pressure, sore throat and trouble swallowing. Upper jaw pain with chewing   Eyes:  Negative for visual disturbance. Respiratory:  Negative for cough, shortness of breath and wheezing. Cardiovascular:  Negative for chest pain, palpitations and leg swelling. Gastrointestinal:  Negative for abdominal pain, blood in stool, constipation, diarrhea, nausea and vomiting. Endocrine: Negative for cold intolerance, heat intolerance, polydipsia, polyphagia and polyuria. Genitourinary:  Negative for difficulty urinating, frequency, hematuria and urgency. Musculoskeletal:  Negative for arthralgias and myalgias. Skin:  Negative for rash. Allergic/Immunologic: Positive for environmental allergies. Neurological:  Negative for dizziness, weakness, light-headedness and headaches. Psychiatric/Behavioral:  Negative for confusion. The patient is not nervous/anxious. Objective:     Physical Exam  Constitutional:       Appearance: Normal appearance. He is well-developed. HENT:      Head: Normocephalic. Jaw: No tenderness or pain on movement. Right Ear: Tympanic membrane normal. No middle ear effusion.  Tympanic membrane is not erythematous. Left Ear: Tympanic membrane normal.  No middle ear effusion. Tympanic membrane is not erythematous. Eyes:      Conjunctiva/sclera: Conjunctivae normal.      Pupils: Pupils are equal, round, and reactive to light. Cardiovascular:      Rate and Rhythm: Normal rate and regular rhythm. Heart sounds: Normal heart sounds. No murmur heard. Pulmonary:      Effort: Pulmonary effort is normal.      Breath sounds: Normal breath sounds. No wheezing. Abdominal:      General: Bowel sounds are normal. There is no distension. Palpations: Abdomen is soft. Musculoskeletal:         General: Normal range of motion. Cervical back: Normal range of motion. Skin:     General: Skin is warm and dry. Neurological:      Mental Status: He is alert and oriented to person, place, and time. Psychiatric:         Behavior: Behavior normal.         Thought Content: Thought content normal.         Judgment: Judgment normal.     /80   Pulse 57   Resp 15   Wt 163 lb 3.2 oz (74 kg)   SpO2 98%   BMI 23.75 kg/m²     Assessment:       Diagnosis Orders   1. Primary hypertension        2. Coronary artery disease involving native heart without angina pectoris, unspecified vessel or lesion type        3. Jaw pain                  Plan:      Return in about 6 months (around 1/25/2023) for hypertension check. HTN, CAD-remains well controlled with current meds, follow up with cardio as directed. Encouraged to cont regular exercise and healthy diet choices  Jaw pain-only occurs with chewing, suspect TMJ. Offered ENT referral and/or CT sinus to rule out sinus issues but he declines for now. Suggested OTC mouth guard at HS and advil prior to meals to see if these may be helpful       Patient given educational materials - see patient instructions. Discussed use, benefit, and side effects of prescribed medications. All patientquestions answered. Pt voiced understanding. Reviewed health maintenance. Instructedto continue current medications, diet and exercise. Patient agreed with treatmentplan. Follow up as directed.      Electronicallysigned by ISI Berry CNP on 7/25/2022 at 12:08 PM

## 2022-11-30 DIAGNOSIS — K21.9 GASTROESOPHAGEAL REFLUX DISEASE, UNSPECIFIED WHETHER ESOPHAGITIS PRESENT: ICD-10-CM

## 2022-12-01 RX ORDER — PANTOPRAZOLE SODIUM 40 MG/1
TABLET, DELAYED RELEASE ORAL
Qty: 90 TABLET | Refills: 1 | Status: SHIPPED | OUTPATIENT
Start: 2022-12-01

## 2023-01-26 ENCOUNTER — HOSPITAL ENCOUNTER (OUTPATIENT)
Age: 68
Setting detail: SPECIMEN
Discharge: HOME | End: 2023-01-26

## 2023-01-26 ENCOUNTER — OFFICE VISIT (OUTPATIENT)
Dept: PRIMARY CARE CLINIC | Age: 68
End: 2023-01-26
Payer: COMMERCIAL

## 2023-01-26 VITALS
OXYGEN SATURATION: 99 % | SYSTOLIC BLOOD PRESSURE: 130 MMHG | HEIGHT: 70 IN | WEIGHT: 158.4 LBS | BODY MASS INDEX: 22.68 KG/M2 | HEART RATE: 60 BPM | DIASTOLIC BLOOD PRESSURE: 80 MMHG

## 2023-01-26 DIAGNOSIS — Z12.5 SCREENING FOR MALIGNANT NEOPLASM OF PROSTATE: ICD-10-CM

## 2023-01-26 DIAGNOSIS — Z95.1 STATUS POST CORONARY ARTERY BYPASS GRAFT: ICD-10-CM

## 2023-01-26 DIAGNOSIS — Z13.0 SCREENING, ANEMIA, DEFICIENCY, IRON: ICD-10-CM

## 2023-01-26 DIAGNOSIS — I10 PRIMARY HYPERTENSION: ICD-10-CM

## 2023-01-26 DIAGNOSIS — Z00.00 ANNUAL PHYSICAL EXAM: Primary | ICD-10-CM

## 2023-01-26 DIAGNOSIS — I25.10 CORONARY ARTERY DISEASE INVOLVING NATIVE HEART WITHOUT ANGINA PECTORIS, UNSPECIFIED VESSEL OR LESION TYPE: Chronic | ICD-10-CM

## 2023-01-26 DIAGNOSIS — K21.9 GASTROESOPHAGEAL REFLUX DISEASE, UNSPECIFIED WHETHER ESOPHAGITIS PRESENT: ICD-10-CM

## 2023-01-26 DIAGNOSIS — N40.0 BENIGN PROSTATIC HYPERPLASIA WITHOUT LOWER URINARY TRACT SYMPTOMS: ICD-10-CM

## 2023-01-26 LAB
ABSOLUTE EOS #: 0.06 K/UL (ref 0–0.44)
ABSOLUTE IMMATURE GRANULOCYTE: <0.03 K/UL (ref 0–0.3)
ABSOLUTE LYMPH #: 1.41 K/UL (ref 1.1–3.7)
ABSOLUTE MONO #: 0.43 K/UL (ref 0.1–1.2)
ALBUMIN SERPL-MCNC: 4.4 G/DL (ref 3.5–5.2)
ALBUMIN/GLOBULIN RATIO: 1.8 (ref 1–2.5)
ALP BLD-CCNC: 77 U/L (ref 40–129)
ALT SERPL-CCNC: 17 U/L (ref 5–41)
ANION GAP SERPL CALCULATED.3IONS-SCNC: 9 MMOL/L (ref 9–17)
AST SERPL-CCNC: 30 U/L
BASOPHILS # BLD: 1 % (ref 0–2)
BASOPHILS ABSOLUTE: <0.03 K/UL (ref 0–0.2)
BILIRUB SERPL-MCNC: 1 MG/DL (ref 0.3–1.2)
BUN BLDV-MCNC: 12 MG/DL (ref 8–23)
CALCIUM SERPL-MCNC: 9.4 MG/DL (ref 8.6–10.4)
CHLORIDE BLD-SCNC: 101 MMOL/L (ref 98–107)
CHOLESTEROL/HDL RATIO: 1.7
CHOLESTEROL: 129 MG/DL
CO2: 24 MMOL/L (ref 20–31)
CREAT SERPL-MCNC: 0.85 MG/DL (ref 0.7–1.2)
EOSINOPHILS RELATIVE PERCENT: 2 % (ref 1–4)
GFR SERPL CREATININE-BSD FRML MDRD: >60 ML/MIN/1.73M2
GLUCOSE BLD-MCNC: 93 MG/DL (ref 70–99)
HCT VFR BLD CALC: 42.8 % (ref 40.7–50.3)
HDLC SERPL-MCNC: 74 MG/DL
HEMOGLOBIN: 14.1 G/DL (ref 13–17)
IMMATURE GRANULOCYTES: 0 %
LDL CHOLESTEROL: 49 MG/DL (ref 0–130)
LYMPHOCYTES # BLD: 40 % (ref 24–43)
MCH RBC QN AUTO: 32.7 PG (ref 25.2–33.5)
MCHC RBC AUTO-ENTMCNC: 32.9 G/DL (ref 28.4–34.8)
MCV RBC AUTO: 99.3 FL (ref 82.6–102.9)
MONOCYTES # BLD: 12 % (ref 3–12)
NRBC AUTOMATED: 0 PER 100 WBC
PDW BLD-RTO: 12.1 % (ref 11.8–14.4)
PLATELET # BLD: 230 K/UL (ref 138–453)
PMV BLD AUTO: 10.4 FL (ref 8.1–13.5)
POTASSIUM SERPL-SCNC: 5.4 MMOL/L (ref 3.7–5.3)
PROSTATE SPECIFIC ANTIGEN: 2.42 NG/ML
RBC # BLD: 4.31 M/UL (ref 4.21–5.77)
SEG NEUTROPHILS: 45 % (ref 36–65)
SEGMENTED NEUTROPHILS ABSOLUTE COUNT: 1.62 K/UL (ref 1.5–8.1)
SODIUM BLD-SCNC: 134 MMOL/L (ref 135–144)
TOTAL PROTEIN: 6.9 G/DL (ref 6.4–8.3)
TRIGL SERPL-MCNC: 28 MG/DL
WBC # BLD: 3.5 K/UL (ref 3.5–11.3)

## 2023-01-26 PROCEDURE — 3079F DIAST BP 80-89 MM HG: CPT | Performed by: NURSE PRACTITIONER

## 2023-01-26 PROCEDURE — 3075F SYST BP GE 130 - 139MM HG: CPT | Performed by: NURSE PRACTITIONER

## 2023-01-26 PROCEDURE — 99397 PER PM REEVAL EST PAT 65+ YR: CPT | Performed by: NURSE PRACTITIONER

## 2023-01-26 RX ORDER — TAMSULOSIN HYDROCHLORIDE 0.4 MG/1
0.4 CAPSULE ORAL DAILY
Qty: 90 CAPSULE | Refills: 1 | Status: SHIPPED | OUTPATIENT
Start: 2023-01-26

## 2023-01-26 RX ORDER — PANTOPRAZOLE SODIUM 40 MG/1
TABLET, DELAYED RELEASE ORAL
Qty: 90 TABLET | Refills: 1 | Status: SHIPPED | OUTPATIENT
Start: 2023-01-26

## 2023-01-26 SDOH — ECONOMIC STABILITY: FOOD INSECURITY: WITHIN THE PAST 12 MONTHS, THE FOOD YOU BOUGHT JUST DIDN'T LAST AND YOU DIDN'T HAVE MONEY TO GET MORE.: NEVER TRUE

## 2023-01-26 SDOH — ECONOMIC STABILITY: FOOD INSECURITY: WITHIN THE PAST 12 MONTHS, YOU WORRIED THAT YOUR FOOD WOULD RUN OUT BEFORE YOU GOT MONEY TO BUY MORE.: NEVER TRUE

## 2023-01-26 ASSESSMENT — ENCOUNTER SYMPTOMS
BLOOD IN STOOL: 0
VOMITING: 0
WHEEZING: 0
ABDOMINAL PAIN: 0
CONSTIPATION: 0
NAUSEA: 0
SORE THROAT: 0
DIARRHEA: 0
SHORTNESS OF BREATH: 0
SINUS PRESSURE: 0
COUGH: 0
TROUBLE SWALLOWING: 0

## 2023-01-26 ASSESSMENT — PATIENT HEALTH QUESTIONNAIRE - PHQ9
10. IF YOU CHECKED OFF ANY PROBLEMS, HOW DIFFICULT HAVE THESE PROBLEMS MADE IT FOR YOU TO DO YOUR WORK, TAKE CARE OF THINGS AT HOME, OR GET ALONG WITH OTHER PEOPLE: 0
SUM OF ALL RESPONSES TO PHQ QUESTIONS 1-9: 0
9. THOUGHTS THAT YOU WOULD BE BETTER OFF DEAD, OR OF HURTING YOURSELF: 0
SUM OF ALL RESPONSES TO PHQ QUESTIONS 1-9: 0
SUM OF ALL RESPONSES TO PHQ QUESTIONS 1-9: 0
2. FEELING DOWN, DEPRESSED OR HOPELESS: 0
4. FEELING TIRED OR HAVING LITTLE ENERGY: 0
1. LITTLE INTEREST OR PLEASURE IN DOING THINGS: 0
7. TROUBLE CONCENTRATING ON THINGS, SUCH AS READING THE NEWSPAPER OR WATCHING TELEVISION: 0
5. POOR APPETITE OR OVEREATING: 0
SUM OF ALL RESPONSES TO PHQ9 QUESTIONS 1 & 2: 0
6. FEELING BAD ABOUT YOURSELF - OR THAT YOU ARE A FAILURE OR HAVE LET YOURSELF OR YOUR FAMILY DOWN: 0
8. MOVING OR SPEAKING SO SLOWLY THAT OTHER PEOPLE COULD HAVE NOTICED. OR THE OPPOSITE, BEING SO FIGETY OR RESTLESS THAT YOU HAVE BEEN MOVING AROUND A LOT MORE THAN USUAL: 0
3. TROUBLE FALLING OR STAYING ASLEEP: 0
SUM OF ALL RESPONSES TO PHQ QUESTIONS 1-9: 0

## 2023-01-26 ASSESSMENT — SOCIAL DETERMINANTS OF HEALTH (SDOH): HOW HARD IS IT FOR YOU TO PAY FOR THE VERY BASICS LIKE FOOD, HOUSING, MEDICAL CARE, AND HEATING?: NOT HARD AT ALL

## 2023-01-26 NOTE — PROGRESS NOTES
[facility] PRIMARY CARE  . [address]  [address] [address]  Dept: [phone]  Dept Fax: [phone]    Torrie West is a 75 y.o. male who presentstoday for his medical conditions/complaints as noted below. Torrie West is c/o of  Chief Complaint   Patient presents with    6 Month Follow-Up    Hypertension    Discuss Medications    Discuss Labs    Annual Exam         HPI:     Here today for annual exam and follow up  Reports has been doing well since last visit  Seeing cardiology every 6 months and has been stable  Has continued to exercise regularly several times per week, no chest pain  Follows healthy diet with fruits and veggies daily    Asking to have flomax managed by this office  States after his CABG he had difficulty with voiding  Went to urologist and was started on flomax, completed 1 follow up and has continued the medication  He states voiding without issues, no side effects from the medication    Hypertension  This is a chronic problem. The current episode started more than 1 year ago. The problem is controlled. Pertinent negatives include no chest pain, headaches, palpitations, peripheral edema or shortness of breath. Past treatments include beta blockers. The current treatment provides significant improvement. There are no compliance problems. Hypertensive end-organ damage includes CAD/MI. There is no history of CVA.      Hemoglobin A1C (%)   Date Value   08/27/2021 5.4             ( goal A1C is < 7)   No results found for: LABMICR  LDL Cholesterol (mg/dL)   Date Value   01/18/2022 46   01/18/2022 46   01/30/2020 86       (goal LDL is <100)   AST (U/L)   Date Value   01/18/2022 27     ALT (U/L)   Date Value   01/18/2022 20     BUN (mg/dL)   Date Value   01/18/2022 17     BP Readings from Last 3 Encounters:   01/26/23 130/80   07/25/22 126/80   01/31/22 129/70          ([id number])    Past Medical History:   Diagnosis Date    Allergic rhinitis Asthma     inhaler    CAD (coronary artery disease)     MI 7/26/2006; 2 stents. Cardiology: Dr Anil Slater care maintenance     PCP:  Rosi ALVES   last seen 1/2021    Hypertension     Inguinal hernia     Irritant contact dermatitis due to plants, except food 08/17/2017    MI (myocardial infarction) [facility]) 2006      Past Surgical History:   Procedure Laterality Date    CARDIAC CATHETERIZATION  08/23/2021    COLONOSCOPY  2004    CORONARY ARTERY BYPASS GRAFT N/A 8/30/2021    CABG CORONARY ARTERY BYPASS X3, ON PUMP, MAGNOLIA PER ANESTHESIA, ENDO RADIAL HARVEST performed by Gertrudis Soliz MD at [address]      Inguinal- not sure of side       Family History   Problem Relation Age of Onset    Stroke Father 67          Social History     Tobacco Use    Smoking status: Never    Smokeless tobacco: Never   Substance Use Topics    Alcohol use: No      Current Outpatient Medications   Medication Sig Dispense Refill    pantoprazole (PROTONIX) 40 MG tablet TAKE 1 TABLET BY MOUTH ONE TIME A DAY 90 tablet 1    tamsulosin (FLOMAX) 0.4 MG capsule Take 1 capsule by mouth daily 90 capsule 1    fluticasone (FLOVENT HFA) 110 MCG/ACT inhaler INHALE 2 PUFFS INTO THE LUNGS TWICE DAILY. 36 g 3    albuterol (PROVENTIL) (2.5 MG/3ML) 0.083% nebulizer solution Take 3 mLs by nebulization every 6 hours as needed for Wheezing or Shortness of Breath 90 each 3    atorvastatin (LIPITOR) 80 MG tablet Take 1 tablet by mouth nightly 30 tablet 3    clopidogrel (PLAVIX) 75 MG tablet Take 1 tablet by mouth daily 30 tablet 3    metoprolol succinate (TOPROL XL) 25 MG extended release tablet Take 0.5 tablets by mouth nightly 30 tablet 3    aspirin 81 MG EC tablet Take 81 mg by mouth daily      LORATADINE PO Take by mouth      fluticasone (FLONASE) 50 MCG/ACT nasal spray USE 2 SPRAYS NASALLY AS DIRECTED ONCE DAILY. 48 g 5     No current facility-administered medications for this visit.      Allergies   Allergen Reactions Cephalexin Other (See Comments)     Pt unsure of reaction       Health Maintenance   Topic Date Due    Colorectal Cancer Screen  Never done    COVID-19 Vaccine (4 - Booster for [employer] series) 12/04/2021    Pneumococcal 65+ years Vaccine (3 - PPSV23 if available, else PCV20) 11/17/2022    Lipids  01/18/2023    Depression Monitoring  07/25/2023    DTaP/Tdap/Td vaccine (2 - Td or Tdap) 11/17/2026    Flu vaccine  Completed    Shingles vaccine  Completed    Hepatitis A vaccine  Aged Out    Hib vaccine  Aged Out    Meningococcal (ACWY) vaccine  Aged Out    Hepatitis C screen  Discontinued       Subjective:      Review of Systems   Constitutional:  Negative for activity change, appetite change, chills, fatigue, fever and unexpected weight change. HENT:  Negative for congestion, ear pain, hearing loss, sinus pressure, sore throat and trouble swallowing. Eyes:  Negative for visual disturbance. Respiratory:  Negative for cough, shortness of breath and wheezing. Cardiovascular:  Negative for chest pain, palpitations and leg swelling. Gastrointestinal:  Negative for abdominal pain, blood in stool, constipation, diarrhea, nausea and vomiting. Endocrine: Negative for cold intolerance, heat intolerance, polydipsia, polyphagia and polyuria. Genitourinary:  Negative for difficulty urinating, frequency, hematuria and urgency. Musculoskeletal:  Negative for arthralgias and myalgias. Skin:  Negative for rash. Allergic/Immunologic: Negative for environmental allergies. Neurological:  Negative for dizziness, weakness, light-headedness and headaches. Psychiatric/Behavioral:  Negative for confusion. The patient is not nervous/anxious. Objective:     Physical Exam  Constitutional:       Appearance: Normal appearance. He is well-developed. HENT:      Head: Normocephalic. Eyes:      Conjunctiva/sclera: Conjunctivae normal.      Pupils: Pupils are equal, round, and reactive to light.    Cardiovascular: Rate and Rhythm: Normal rate and regular rhythm. Heart sounds: Normal heart sounds. No murmur heard. Pulmonary:      Effort: Pulmonary effort is normal.      Breath sounds: Normal breath sounds. No wheezing. Abdominal:      General: Bowel sounds are normal. There is no distension. Palpations: Abdomen is soft. Musculoskeletal:         General: Normal range of motion. Cervical back: Normal range of motion. Skin:     General: Skin is warm and dry. Neurological:      Mental Status: He is alert and oriented to person, place, and time. Psychiatric:         Behavior: Behavior normal.         Thought Content: Thought content normal.         Judgment: Judgment normal.     /80   Pulse 60   Ht 5' 9.5\" (1.765 m)   Wt 158 lb 6.4 oz (71.8 kg)   SpO2 99%   BMI 23.06 kg/m²     Assessment:       Diagnosis Orders   1. Annual physical exam        2. Gastroesophageal reflux disease, unspecified whether esophagitis present  pantoprazole (PROTONIX) 40 MG tablet      3. Coronary artery disease involving native heart without angina pectoris, unspecified vessel or lesion type        4. Primary hypertension        5. Status post coronary artery bypass graft        6. Screening for malignant neoplasm of prostate  PSA Screening      7. Screening, anemia, deficiency, iron  CBC with Auto Differential      8. Benign prostatic hyperplasia without lower urinary tract symptoms  tamsulosin (FLOMAX) 0.4 MG capsule                Plan:      Return in about 6 months (around 7/26/2023) for hypertension check. Annual exam-screening labs ordered, cont regular exercise, reviewed healthy diet  GERD-well controlled with PPI and avoidance of triggers  CAD, CABG, HTN-stable and well controlled on beta blocker, statin and plavix, he will follow up as directed with cardio  BPH-refill on flomax, asymptomatic at this time, check PSA.  He is no longer seeing urology  Orders Placed This Encounter   Procedures    PSA Screening Standing Status:   Future     Number of Occurrences:   1     Standing Expiration Date:   1/26/2024    CBC with Auto Differential     Standing Status:   Future     Number of Occurrences:   1     Standing Expiration Date:   1/26/2024          Orders Placed This Encounter   Medications    pantoprazole (PROTONIX) 40 MG tablet     Sig: TAKE 1 TABLET BY MOUTH ONE TIME A DAY     Dispense:  90 tablet     Refill:  1    tamsulosin (FLOMAX) 0.4 MG capsule     Sig: Take 1 capsule by mouth daily     Dispense:  90 capsule     Refill:  1         Patient given educational materials - see patient instructions. Discussed use, benefit, and side effects of prescribed medications. All patientquestions answered. Pt voiced understanding. Reviewed health maintenance. Instructedto continue current medications, diet and exercise. Patient agreed with treatmentplan. Follow up as directed.      Electronicallysigned by ISI Fernandez CNP on 1/26/2023 at 10:33 AM

## 2023-01-27 DIAGNOSIS — E87.5 HYPERKALEMIA: Primary | ICD-10-CM

## 2023-07-16 DIAGNOSIS — N40.0 BENIGN PROSTATIC HYPERPLASIA WITHOUT LOWER URINARY TRACT SYMPTOMS: ICD-10-CM

## 2023-07-17 RX ORDER — TAMSULOSIN HYDROCHLORIDE 0.4 MG/1
CAPSULE ORAL
Qty: 90 CAPSULE | Refills: 1 | Status: SHIPPED | OUTPATIENT
Start: 2023-07-17

## 2023-08-22 DIAGNOSIS — K21.9 GASTROESOPHAGEAL REFLUX DISEASE, UNSPECIFIED WHETHER ESOPHAGITIS PRESENT: ICD-10-CM

## 2023-08-23 RX ORDER — PANTOPRAZOLE SODIUM 40 MG/1
TABLET, DELAYED RELEASE ORAL
Qty: 90 TABLET | Refills: 1 | Status: SHIPPED | OUTPATIENT
Start: 2023-08-23

## 2023-11-14 ENCOUNTER — HOSPITAL ENCOUNTER (OUTPATIENT)
Age: 68
Setting detail: SPECIMEN
Discharge: HOME | End: 2023-11-14

## 2023-11-14 ENCOUNTER — OFFICE VISIT (OUTPATIENT)
Dept: PRIMARY CARE CLINIC | Age: 68
End: 2023-11-14
Payer: COMMERCIAL

## 2023-11-14 VITALS
WEIGHT: 160.6 LBS | DIASTOLIC BLOOD PRESSURE: 62 MMHG | SYSTOLIC BLOOD PRESSURE: 132 MMHG | OXYGEN SATURATION: 98 % | HEIGHT: 71 IN | HEART RATE: 71 BPM | BODY MASS INDEX: 22.48 KG/M2

## 2023-11-14 DIAGNOSIS — N40.0 BENIGN PROSTATIC HYPERPLASIA WITHOUT LOWER URINARY TRACT SYMPTOMS: ICD-10-CM

## 2023-11-14 DIAGNOSIS — E87.5 HYPERKALEMIA: ICD-10-CM

## 2023-11-14 DIAGNOSIS — J45.40 MODERATE PERSISTENT ASTHMA WITHOUT COMPLICATION: ICD-10-CM

## 2023-11-14 DIAGNOSIS — E87.1 HYPONATREMIA: ICD-10-CM

## 2023-11-14 DIAGNOSIS — I25.10 CORONARY ARTERY DISEASE INVOLVING NATIVE HEART WITHOUT ANGINA PECTORIS, UNSPECIFIED VESSEL OR LESION TYPE: Chronic | ICD-10-CM

## 2023-11-14 DIAGNOSIS — Z23 NEED FOR INFLUENZA VACCINATION: ICD-10-CM

## 2023-11-14 DIAGNOSIS — K21.9 GASTROESOPHAGEAL REFLUX DISEASE, UNSPECIFIED WHETHER ESOPHAGITIS PRESENT: Primary | ICD-10-CM

## 2023-11-14 DIAGNOSIS — Z23 NEED FOR PNEUMOCOCCAL 20-VALENT CONJUGATE VACCINATION: ICD-10-CM

## 2023-11-14 LAB
ANION GAP SERPL CALCULATED.3IONS-SCNC: 9 MMOL/L (ref 9–16)
BUN SERPL-MCNC: 16 MG/DL (ref 8–23)
CALCIUM SERPL-MCNC: 9.1 MG/DL (ref 8.6–10.4)
CHLORIDE SERPL-SCNC: 104 MMOL/L (ref 98–107)
CO2 SERPL-SCNC: 27 MMOL/L (ref 20–31)
CREAT SERPL-MCNC: 0.9 MG/DL (ref 0.7–1.2)
GFR SERPL CREATININE-BSD FRML MDRD: >60 ML/MIN/1.73M2
GLUCOSE SERPL-MCNC: 87 MG/DL (ref 74–99)
POTASSIUM SERPL-SCNC: 4.5 MMOL/L (ref 3.7–5.3)
SODIUM SERPL-SCNC: 140 MMOL/L (ref 136–145)

## 2023-11-14 PROCEDURE — 90472 IMMUNIZATION ADMIN EACH ADD: CPT | Performed by: NURSE PRACTITIONER

## 2023-11-14 PROCEDURE — 3075F SYST BP GE 130 - 139MM HG: CPT | Performed by: NURSE PRACTITIONER

## 2023-11-14 PROCEDURE — 90471 IMMUNIZATION ADMIN: CPT | Performed by: NURSE PRACTITIONER

## 2023-11-14 PROCEDURE — 3078F DIAST BP <80 MM HG: CPT | Performed by: NURSE PRACTITIONER

## 2023-11-14 PROCEDURE — 90694 VACC AIIV4 NO PRSRV 0.5ML IM: CPT | Performed by: NURSE PRACTITIONER

## 2023-11-14 PROCEDURE — 1123F ACP DISCUSS/DSCN MKR DOCD: CPT | Performed by: NURSE PRACTITIONER

## 2023-11-14 PROCEDURE — 99214 OFFICE O/P EST MOD 30 MIN: CPT | Performed by: NURSE PRACTITIONER

## 2023-11-14 PROCEDURE — 90677 PCV20 VACCINE IM: CPT | Performed by: NURSE PRACTITIONER

## 2023-11-14 RX ORDER — TAMSULOSIN HYDROCHLORIDE 0.4 MG/1
CAPSULE ORAL
Qty: 90 CAPSULE | Refills: 1 | Status: CANCELLED | OUTPATIENT
Start: 2023-11-14

## 2023-11-14 RX ORDER — PANTOPRAZOLE SODIUM 40 MG/1
TABLET, DELAYED RELEASE ORAL
Qty: 90 TABLET | Refills: 1 | Status: CANCELLED | OUTPATIENT
Start: 2023-11-14

## 2023-11-14 SDOH — ECONOMIC STABILITY: FOOD INSECURITY: WITHIN THE PAST 12 MONTHS, YOU WORRIED THAT YOUR FOOD WOULD RUN OUT BEFORE YOU GOT MONEY TO BUY MORE.: NEVER TRUE

## 2023-11-14 SDOH — ECONOMIC STABILITY: INCOME INSECURITY: HOW HARD IS IT FOR YOU TO PAY FOR THE VERY BASICS LIKE FOOD, HOUSING, MEDICAL CARE, AND HEATING?: NOT HARD AT ALL

## 2023-11-14 SDOH — ECONOMIC STABILITY: FOOD INSECURITY: WITHIN THE PAST 12 MONTHS, THE FOOD YOU BOUGHT JUST DIDN'T LAST AND YOU DIDN'T HAVE MONEY TO GET MORE.: NEVER TRUE

## 2023-11-14 SDOH — ECONOMIC STABILITY: HOUSING INSECURITY
IN THE LAST 12 MONTHS, WAS THERE A TIME WHEN YOU DID NOT HAVE A STEADY PLACE TO SLEEP OR SLEPT IN A SHELTER (INCLUDING NOW)?: NO

## 2023-11-14 ASSESSMENT — COLUMBIA-SUICIDE SEVERITY RATING SCALE - C-SSRS
7. DID THIS OCCUR IN THE LAST THREE MONTHS: NO
4. HAVE YOU HAD THESE THOUGHTS AND HAD SOME INTENTION OF ACTING ON THEM?: NO
3. HAVE YOU BEEN THINKING ABOUT HOW YOU MIGHT KILL YOURSELF?: NO
5. HAVE YOU STARTED TO WORK OUT OR WORKED OUT THE DETAILS OF HOW TO KILL YOURSELF? DO YOU INTEND TO CARRY OUT THIS PLAN?: NO

## 2023-11-14 ASSESSMENT — ENCOUNTER SYMPTOMS
DIARRHEA: 0
BLOOD IN STOOL: 0
CONSTIPATION: 0
NAUSEA: 0
TROUBLE SWALLOWING: 0
WHEEZING: 0
COUGH: 0
SINUS PRESSURE: 0
SHORTNESS OF BREATH: 0
ABDOMINAL PAIN: 0
SORE THROAT: 0
VOMITING: 0

## 2023-11-14 ASSESSMENT — PATIENT HEALTH QUESTIONNAIRE - PHQ9
10. IF YOU CHECKED OFF ANY PROBLEMS, HOW DIFFICULT HAVE THESE PROBLEMS MADE IT FOR YOU TO DO YOUR WORK, TAKE CARE OF THINGS AT HOME, OR GET ALONG WITH OTHER PEOPLE: 0
9. THOUGHTS THAT YOU WOULD BE BETTER OFF DEAD, OR OF HURTING YOURSELF: 0
SUM OF ALL RESPONSES TO PHQ QUESTIONS 1-9: 0
6. FEELING BAD ABOUT YOURSELF - OR THAT YOU ARE A FAILURE OR HAVE LET YOURSELF OR YOUR FAMILY DOWN: 0
1. LITTLE INTEREST OR PLEASURE IN DOING THINGS: 0
SUM OF ALL RESPONSES TO PHQ QUESTIONS 1-9: 0
SUM OF ALL RESPONSES TO PHQ9 QUESTIONS 1 & 2: 0
SUM OF ALL RESPONSES TO PHQ QUESTIONS 1-9: 0
4. FEELING TIRED OR HAVING LITTLE ENERGY: 0
7. TROUBLE CONCENTRATING ON THINGS, SUCH AS READING THE NEWSPAPER OR WATCHING TELEVISION: 0
SUM OF ALL RESPONSES TO PHQ QUESTIONS 1-9: 0
8. MOVING OR SPEAKING SO SLOWLY THAT OTHER PEOPLE COULD HAVE NOTICED. OR THE OPPOSITE, BEING SO FIGETY OR RESTLESS THAT YOU HAVE BEEN MOVING AROUND A LOT MORE THAN USUAL: 0
5. POOR APPETITE OR OVEREATING: 0
3. TROUBLE FALLING OR STAYING ASLEEP: 0
2. FEELING DOWN, DEPRESSED OR HOPELESS: 0

## 2023-12-29 NOTE — ANESTHESIA POSTPROCEDURE EVALUATION
Department of Anesthesiology  Postprocedure Note    Patient: Deepa Leigh  MRN: [id number]  YOB: 1955  Date of evaluation: 8/30/2021  Time:  5:42 PM     Procedure Summary     Date: 08/30/21 Room / Location: [address] [place] [address]    Anesthesia Start: 4196 Anesthesia Stop: 0675    Procedure: CABG CORONARY ARTERY BYPASS X3, ON PUMP, MAGNOLIA PER ANESTHESIA, ENDO RADIAL HARVEST (N/A ) Diagnosis: (CORONARY ARTERY DISEASE)    Surgeons: Lien Lockhart MD Responsible Provider: Cristine Delacruz MD    Anesthesia Type: general ASA Status: 4          Anesthesia Type: general    Kymberly Phase I: Kymberly Score: 3    Kymberly Phase II:      Last vitals: Reviewed and per EMR flowsheets. Anesthesia Post Evaluation    Patient location during evaluation: ICU  Patient participation: complete - patient cannot participate  Level of consciousness: sedated and ventilated  Pain scale: Sedated.   Airway patency: patent  Nausea & Vomiting: no nausea and no vomiting  Complications: no  Cardiovascular status: hemodynamically stable and blood pressure returned to baseline  Respiratory status: acceptable, ventilator and intubated  Hydration status: euvolemic
No

## 2024-01-16 DIAGNOSIS — N40.0 BENIGN PROSTATIC HYPERPLASIA WITHOUT LOWER URINARY TRACT SYMPTOMS: ICD-10-CM

## 2024-01-16 RX ORDER — TAMSULOSIN HYDROCHLORIDE 0.4 MG/1
0.4 CAPSULE ORAL DAILY
Qty: 90 CAPSULE | Refills: 1 | Status: SHIPPED | OUTPATIENT
Start: 2024-01-16

## 2024-02-16 ENCOUNTER — TELEPHONE (OUTPATIENT)
Dept: FAMILY MEDICINE CLINIC | Age: 69
End: 2024-02-16

## 2024-02-16 NOTE — TELEPHONE ENCOUNTER
Patient called into office to verify when his upcoming appointment was with PCP, writer informed patient appointment was not until 2/27/24 at 10:40 am. Patient verbalized understanding.

## 2024-02-22 DIAGNOSIS — K21.9 GASTROESOPHAGEAL REFLUX DISEASE, UNSPECIFIED WHETHER ESOPHAGITIS PRESENT: ICD-10-CM

## 2024-02-22 RX ORDER — PANTOPRAZOLE SODIUM 40 MG/1
TABLET, DELAYED RELEASE ORAL
Qty: 90 TABLET | Refills: 1 | Status: SHIPPED | OUTPATIENT
Start: 2024-02-22

## 2024-03-07 ENCOUNTER — OFFICE VISIT (OUTPATIENT)
Dept: PRIMARY CARE CLINIC | Age: 69
End: 2024-03-07
Payer: COMMERCIAL

## 2024-03-07 VITALS
HEART RATE: 59 BPM | SYSTOLIC BLOOD PRESSURE: 120 MMHG | BODY MASS INDEX: 23.56 KG/M2 | OXYGEN SATURATION: 99 % | DIASTOLIC BLOOD PRESSURE: 70 MMHG | HEIGHT: 70 IN | WEIGHT: 164.6 LBS

## 2024-03-07 DIAGNOSIS — I25.10 CORONARY ARTERY DISEASE INVOLVING NATIVE HEART WITHOUT ANGINA PECTORIS, UNSPECIFIED VESSEL OR LESION TYPE: Chronic | ICD-10-CM

## 2024-03-07 DIAGNOSIS — Z00.00 ANNUAL PHYSICAL EXAM: Primary | ICD-10-CM

## 2024-03-07 DIAGNOSIS — N40.0 BENIGN PROSTATIC HYPERPLASIA WITHOUT LOWER URINARY TRACT SYMPTOMS: ICD-10-CM

## 2024-03-07 DIAGNOSIS — Z12.5 SCREENING FOR MALIGNANT NEOPLASM OF PROSTATE: ICD-10-CM

## 2024-03-07 DIAGNOSIS — I10 PRIMARY HYPERTENSION: ICD-10-CM

## 2024-03-07 DIAGNOSIS — L74.0 HEAT RASH: ICD-10-CM

## 2024-03-07 DIAGNOSIS — Z13.0 SCREENING, ANEMIA, DEFICIENCY, IRON: ICD-10-CM

## 2024-03-07 PROCEDURE — 99397 PER PM REEVAL EST PAT 65+ YR: CPT | Performed by: NURSE PRACTITIONER

## 2024-03-07 PROCEDURE — 3074F SYST BP LT 130 MM HG: CPT | Performed by: NURSE PRACTITIONER

## 2024-03-07 PROCEDURE — 3078F DIAST BP <80 MM HG: CPT | Performed by: NURSE PRACTITIONER

## 2024-03-07 RX ORDER — TRIAMCINOLONE ACETONIDE 5 MG/G
CREAM TOPICAL
Qty: 45 G | Refills: 3 | Status: SHIPPED | OUTPATIENT
Start: 2024-03-07

## 2024-03-07 SDOH — ECONOMIC STABILITY: FOOD INSECURITY: WITHIN THE PAST 12 MONTHS, YOU WORRIED THAT YOUR FOOD WOULD RUN OUT BEFORE YOU GOT MONEY TO BUY MORE.: NEVER TRUE

## 2024-03-07 SDOH — ECONOMIC STABILITY: INCOME INSECURITY: HOW HARD IS IT FOR YOU TO PAY FOR THE VERY BASICS LIKE FOOD, HOUSING, MEDICAL CARE, AND HEATING?: NOT HARD AT ALL

## 2024-03-07 SDOH — ECONOMIC STABILITY: FOOD INSECURITY: WITHIN THE PAST 12 MONTHS, THE FOOD YOU BOUGHT JUST DIDN'T LAST AND YOU DIDN'T HAVE MONEY TO GET MORE.: NEVER TRUE

## 2024-03-07 ASSESSMENT — PATIENT HEALTH QUESTIONNAIRE - PHQ9
2. FEELING DOWN, DEPRESSED OR HOPELESS: 0
9. THOUGHTS THAT YOU WOULD BE BETTER OFF DEAD, OR OF HURTING YOURSELF: 0
1. LITTLE INTEREST OR PLEASURE IN DOING THINGS: 0
7. TROUBLE CONCENTRATING ON THINGS, SUCH AS READING THE NEWSPAPER OR WATCHING TELEVISION: 0
3. TROUBLE FALLING OR STAYING ASLEEP: 0
SUM OF ALL RESPONSES TO PHQ QUESTIONS 1-9: 0
10. IF YOU CHECKED OFF ANY PROBLEMS, HOW DIFFICULT HAVE THESE PROBLEMS MADE IT FOR YOU TO DO YOUR WORK, TAKE CARE OF THINGS AT HOME, OR GET ALONG WITH OTHER PEOPLE: 0
SUM OF ALL RESPONSES TO PHQ QUESTIONS 1-9: 0
SUM OF ALL RESPONSES TO PHQ QUESTIONS 1-9: 0
8. MOVING OR SPEAKING SO SLOWLY THAT OTHER PEOPLE COULD HAVE NOTICED. OR THE OPPOSITE, BEING SO FIGETY OR RESTLESS THAT YOU HAVE BEEN MOVING AROUND A LOT MORE THAN USUAL: 0
6. FEELING BAD ABOUT YOURSELF - OR THAT YOU ARE A FAILURE OR HAVE LET YOURSELF OR YOUR FAMILY DOWN: 0
SUM OF ALL RESPONSES TO PHQ QUESTIONS 1-9: 0
SUM OF ALL RESPONSES TO PHQ9 QUESTIONS 1 & 2: 0
5. POOR APPETITE OR OVEREATING: 0
4. FEELING TIRED OR HAVING LITTLE ENERGY: 0

## 2024-03-07 ASSESSMENT — ENCOUNTER SYMPTOMS
SORE THROAT: 0
WHEEZING: 0
TROUBLE SWALLOWING: 0
SINUS PRESSURE: 0

## 2024-03-07 NOTE — PROGRESS NOTES
Panel     Standing Status:   Future     Standing Expiration Date:   3/7/2025        Orders Placed This Encounter   Medications    triamcinolone (ARISTOCORT) 0.5 % cream     Sig: Apply topically 3 times daily.     Dispense:  45 g     Refill:  3       Patient given educational materials - see patient instructions.Discussed use, benefit, and side effects of prescribed medications.  All patientquestions answered. Pt voiced understanding. Reviewed health maintenance.  Instructedto continue current medications, diet and exercise.  Patient agreed with treatmentplan. Follow up as directed.     Electronicallysigned by ISI Clark CNP on 3/7/2024 at 5:10 PM

## 2024-04-15 DIAGNOSIS — J45.40 MODERATE PERSISTENT ASTHMA WITHOUT COMPLICATION: ICD-10-CM

## 2024-04-15 RX ORDER — FLUTICASONE PROPIONATE 110 UG/1
AEROSOL, METERED RESPIRATORY (INHALATION)
Qty: 36 G | Refills: 3 | Status: SHIPPED | OUTPATIENT
Start: 2024-04-15

## 2024-06-25 ENCOUNTER — HOSPITAL ENCOUNTER (OUTPATIENT)
Age: 69
Discharge: HOME | End: 2024-06-25
Payer: COMMERCIAL

## 2024-06-25 DIAGNOSIS — Z13.0 SCREENING, ANEMIA, DEFICIENCY, IRON: ICD-10-CM

## 2024-06-25 DIAGNOSIS — I25.10 CORONARY ARTERY DISEASE INVOLVING NATIVE HEART WITHOUT ANGINA PECTORIS, UNSPECIFIED VESSEL OR LESION TYPE: Chronic | ICD-10-CM

## 2024-06-25 DIAGNOSIS — I10 PRIMARY HYPERTENSION: ICD-10-CM

## 2024-06-25 DIAGNOSIS — Z12.5 SCREENING FOR MALIGNANT NEOPLASM OF PROSTATE: ICD-10-CM

## 2024-06-25 LAB
ALBUMIN SERPL-MCNC: 4.3 G/DL (ref 3.5–5.2)
ALP SERPL-CCNC: 77 U/L (ref 40–129)
ALT SERPL-CCNC: 17 U/L (ref 5–41)
ANION GAP SERPL CALCULATED.3IONS-SCNC: 10 MMOL/L (ref 9–17)
AST SERPL-CCNC: 23 U/L
BASOPHILS # BLD: 0 K/UL (ref 0–0.2)
BASOPHILS NFR BLD: 1 % (ref 0–2)
BILIRUB SERPL-MCNC: 0.8 MG/DL (ref 0.3–1.2)
BUN SERPL-MCNC: 15 MG/DL (ref 8–23)
CALCIUM SERPL-MCNC: 9 MG/DL (ref 8.6–10.4)
CHLORIDE SERPL-SCNC: 104 MMOL/L (ref 98–107)
CHOLEST SERPL-MCNC: 126 MG/DL
CHOLESTEROL/HDL RATIO: 1.8
CO2 SERPL-SCNC: 25 MMOL/L (ref 20–31)
CREAT SERPL-MCNC: 0.9 MG/DL (ref 0.7–1.2)
EOSINOPHIL # BLD: 0.2 K/UL (ref 0–0.4)
EOSINOPHILS RELATIVE PERCENT: 5 % (ref 0–4)
ERYTHROCYTE [DISTWIDTH] IN BLOOD BY AUTOMATED COUNT: 12.8 % (ref 11.5–14.9)
GFR, ESTIMATED: >90 ML/MIN/1.73M2
GLUCOSE SERPL-MCNC: 103 MG/DL (ref 70–99)
HCT VFR BLD AUTO: 42.2 % (ref 41–53)
HDLC SERPL-MCNC: 71 MG/DL
HGB BLD-MCNC: 13.9 G/DL (ref 13.5–17.5)
LDLC SERPL CALC-MCNC: 48 MG/DL (ref 0–130)
LYMPHOCYTES NFR BLD: 1.8 K/UL (ref 1–4.8)
LYMPHOCYTES RELATIVE PERCENT: 38 % (ref 24–44)
MCH RBC QN AUTO: 33.2 PG (ref 26–34)
MCHC RBC AUTO-ENTMCNC: 33 G/DL (ref 31–37)
MCV RBC AUTO: 100.4 FL (ref 80–100)
MONOCYTES NFR BLD: 0.5 K/UL (ref 0.1–1.3)
MONOCYTES NFR BLD: 11 % (ref 1–7)
NEUTROPHILS NFR BLD: 45 % (ref 36–66)
NEUTS SEG NFR BLD: 2.1 K/UL (ref 1.3–9.1)
PLATELET # BLD AUTO: 197 K/UL (ref 150–450)
PMV BLD AUTO: 8.7 FL (ref 6–12)
POTASSIUM SERPL-SCNC: 4.5 MMOL/L (ref 3.7–5.3)
PROT SERPL-MCNC: 6.7 G/DL (ref 6.4–8.3)
PSA SERPL-MCNC: 2.2 NG/ML (ref 0–4)
RBC # BLD AUTO: 4.2 M/UL (ref 4.5–5.9)
SODIUM SERPL-SCNC: 139 MMOL/L (ref 135–144)
TRIGL SERPL-MCNC: 34 MG/DL
WBC OTHER # BLD: 4.7 K/UL (ref 3.5–11)

## 2024-06-25 PROCEDURE — 85025 COMPLETE CBC W/AUTO DIFF WBC: CPT

## 2024-06-25 PROCEDURE — 36415 COLL VENOUS BLD VENIPUNCTURE: CPT

## 2024-06-25 PROCEDURE — 80061 LIPID PANEL: CPT

## 2024-06-25 PROCEDURE — G0103 PSA SCREENING: HCPCS

## 2024-06-25 PROCEDURE — 80053 COMPREHEN METABOLIC PANEL: CPT

## 2024-08-12 ENCOUNTER — OFFICE VISIT (OUTPATIENT)
Dept: UROLOGY | Age: 69
End: 2024-08-12
Payer: COMMERCIAL

## 2024-08-12 VITALS
SYSTOLIC BLOOD PRESSURE: 120 MMHG | OXYGEN SATURATION: 98 % | BODY MASS INDEX: 22.76 KG/M2 | TEMPERATURE: 97.8 F | HEIGHT: 70 IN | HEART RATE: 70 BPM | WEIGHT: 159 LBS | DIASTOLIC BLOOD PRESSURE: 82 MMHG

## 2024-08-12 DIAGNOSIS — N13.8 BPH WITH OBSTRUCTION/LOWER URINARY TRACT SYMPTOMS: Primary | ICD-10-CM

## 2024-08-12 DIAGNOSIS — R39.12 WEAK URINARY STREAM: ICD-10-CM

## 2024-08-12 DIAGNOSIS — R33.9 INCOMPLETE BLADDER EMPTYING: ICD-10-CM

## 2024-08-12 DIAGNOSIS — N40.1 BPH WITH OBSTRUCTION/LOWER URINARY TRACT SYMPTOMS: Primary | ICD-10-CM

## 2024-08-12 DIAGNOSIS — R39.13 SPLIT OF URINARY STREAM: ICD-10-CM

## 2024-08-12 PROBLEM — I20.9 ANGINA PECTORIS, UNSPECIFIED (HCC): Status: ACTIVE | Noted: 2024-08-12

## 2024-08-12 PROBLEM — I25.119 ATHEROSCLEROTIC HEART DISEASE OF NATIVE CORONARY ARTERY WITH UNSPECIFIED ANGINA PECTORIS (HCC): Status: ACTIVE | Noted: 2024-08-12

## 2024-08-12 PROCEDURE — 3079F DIAST BP 80-89 MM HG: CPT | Performed by: UROLOGY

## 2024-08-12 PROCEDURE — 99214 OFFICE O/P EST MOD 30 MIN: CPT | Performed by: UROLOGY

## 2024-08-12 PROCEDURE — 3074F SYST BP LT 130 MM HG: CPT | Performed by: UROLOGY

## 2024-08-12 PROCEDURE — 1123F ACP DISCUSS/DSCN MKR DOCD: CPT | Performed by: UROLOGY

## 2024-08-12 RX ORDER — SILODOSIN 8 MG/1
8 CAPSULE ORAL EVERY EVENING
Qty: 90 CAPSULE | Refills: 3 | Status: SHIPPED | OUTPATIENT
Start: 2024-08-12

## 2024-08-12 ASSESSMENT — ENCOUNTER SYMPTOMS
GASTROINTESTINAL NEGATIVE: 1
WHEEZING: 0
BACK PAIN: 0
VOMITING: 0
RESPIRATORY NEGATIVE: 1
EYE PAIN: 0
NAUSEA: 0
SHORTNESS OF BREATH: 0
EYES NEGATIVE: 1
COLOR CHANGE: 0
COUGH: 0
ALLERGIC/IMMUNOLOGIC NEGATIVE: 1
EYE REDNESS: 0
ABDOMINAL PAIN: 0

## 2024-08-12 NOTE — PROGRESS NOTES
Review of Systems   Constitutional: Negative.  Negative for appetite change, chills and fever.   HENT: Negative.     Eyes: Negative.  Negative for pain, redness and visual disturbance.   Respiratory: Negative.  Negative for cough, shortness of breath and wheezing.    Cardiovascular: Negative.  Negative for chest pain and leg swelling.   Gastrointestinal: Negative.  Negative for abdominal pain, nausea and vomiting.   Endocrine: Negative.    Genitourinary:  Positive for difficulty urinating and frequency. Negative for dysuria, flank pain, hematuria, testicular pain and urgency.   Musculoskeletal: Negative.  Negative for back pain, joint swelling and myalgias.   Skin: Negative.  Negative for color change, rash and wound.   Allergic/Immunologic: Negative.    Neurological: Negative.  Negative for dizziness, tremors, weakness, numbness and headaches.   Hematological: Negative.  Negative for adenopathy. Does not bruise/bleed easily.   Psychiatric/Behavioral: Negative.

## 2024-08-12 NOTE — PROGRESS NOTES
[facility] UROLOGY [facility]  [address]  [facility] [address]  Dept: [phone]  Dept Fax: [phone]    [facility] Urology Office Note - New patient    Patient:  Marko Michel  YOB: 1955  Date: 8/12/2024    The patient is a 68 y.o. male who presents todayfor evaluation of the following problems:   Chief Complaint   Patient presents with    Urinary Frequency    referred by Michelle Granado, ISI - CNP.      HPI  This a very pleasant 68-year-old gentleman who has a history of BPH.  He has been on Flomax for a few years.  However, his lower urinary tract symptoms have progressed.  His stream is weaker and he is having more postvoid dribbling.  He also does not feel that he empties his bladder all the time and also has a split stream.    (Patient's old records have been requested, reviewed and summarized in today's note.)    Summary of old records: N/A    Additional History: N/A    Procedures Today: N/A    Last several PSA's:  Lab Results   Component Value Date    PSA 2.20 06/25/2024    PSA 2.42 01/26/2023    PSA 2.00 01/18/2022     Last total testosterone:  Lab Results   Component Value Date    TESTOSTERONE 855 12/07/2016     Urinalysis today:  No results found for this visit on 08/12/24.    AUA Symptom Score (8/12/2024):  INCOMPLETE EMPTYING: How often have you had the sensation of not emptying your bladder?: Not at all  FREQUENCY: How often do you have to urinate less than every two hours?: Almost always  INTERMITTENCY: How often have you found you stopped and started again several times when you urinated?: Almost always  URGENCY: How often have you found it difficult to postpone urination?: Not at all  WEAK STREAM: How often have you had a weak urinary stream?: Almost always  STRAINING: How often have you had to strain to start  urination?: Not at all  NOCTURIA: How many times did you typically get up at night to uriniate?: 3

## 2024-08-21 DIAGNOSIS — K21.9 GASTROESOPHAGEAL REFLUX DISEASE, UNSPECIFIED WHETHER ESOPHAGITIS PRESENT: ICD-10-CM

## 2024-08-21 RX ORDER — PANTOPRAZOLE SODIUM 40 MG/1
TABLET, DELAYED RELEASE ORAL
Qty: 90 TABLET | Refills: 1 | Status: SHIPPED | OUTPATIENT
Start: 2024-08-21

## 2024-09-09 ENCOUNTER — OFFICE VISIT (OUTPATIENT)
Dept: PRIMARY CARE CLINIC | Age: 69
End: 2024-09-09
Payer: COMMERCIAL

## 2024-09-09 VITALS
OXYGEN SATURATION: 99 % | HEART RATE: 62 BPM | BODY MASS INDEX: 22.21 KG/M2 | WEIGHT: 154.8 LBS | DIASTOLIC BLOOD PRESSURE: 80 MMHG | RESPIRATION RATE: 16 BRPM | SYSTOLIC BLOOD PRESSURE: 124 MMHG

## 2024-09-09 DIAGNOSIS — I25.119 ATHEROSCLEROSIS OF NATIVE CORONARY ARTERY OF NATIVE HEART WITH ANGINA PECTORIS (HCC): ICD-10-CM

## 2024-09-09 DIAGNOSIS — Z95.1 STATUS POST CORONARY ARTERY BYPASS GRAFT: ICD-10-CM

## 2024-09-09 DIAGNOSIS — N40.0 BENIGN PROSTATIC HYPERPLASIA WITHOUT LOWER URINARY TRACT SYMPTOMS: ICD-10-CM

## 2024-09-09 DIAGNOSIS — I10 PRIMARY HYPERTENSION: Primary | ICD-10-CM

## 2024-09-09 PROBLEM — I20.9 ANGINA PECTORIS, UNSPECIFIED (HCC): Status: RESOLVED | Noted: 2024-08-12 | Resolved: 2024-09-09

## 2024-09-09 PROCEDURE — 3074F SYST BP LT 130 MM HG: CPT | Performed by: NURSE PRACTITIONER

## 2024-09-09 PROCEDURE — 1123F ACP DISCUSS/DSCN MKR DOCD: CPT | Performed by: NURSE PRACTITIONER

## 2024-09-09 PROCEDURE — 3079F DIAST BP 80-89 MM HG: CPT | Performed by: NURSE PRACTITIONER

## 2024-09-09 PROCEDURE — 99214 OFFICE O/P EST MOD 30 MIN: CPT | Performed by: NURSE PRACTITIONER

## 2024-09-09 ASSESSMENT — ENCOUNTER SYMPTOMS
CONSTIPATION: 0
WHEEZING: 0
DIARRHEA: 0
BLOOD IN STOOL: 0
SORE THROAT: 0
TROUBLE SWALLOWING: 0
COUGH: 0
VOMITING: 0
NAUSEA: 0
SINUS PRESSURE: 0
ABDOMINAL PAIN: 0

## 2024-09-16 ENCOUNTER — OFFICE VISIT (OUTPATIENT)
Dept: UROLOGY | Age: 69
End: 2024-09-16
Payer: COMMERCIAL

## 2024-09-16 VITALS
OXYGEN SATURATION: 100 % | SYSTOLIC BLOOD PRESSURE: 126 MMHG | HEART RATE: 98 BPM | DIASTOLIC BLOOD PRESSURE: 84 MMHG | WEIGHT: 154 LBS | HEIGHT: 70 IN | BODY MASS INDEX: 22.05 KG/M2 | TEMPERATURE: 97.9 F

## 2024-09-16 DIAGNOSIS — N40.1 BPH WITH OBSTRUCTION/LOWER URINARY TRACT SYMPTOMS: Primary | ICD-10-CM

## 2024-09-16 DIAGNOSIS — N13.8 BPH WITH OBSTRUCTION/LOWER URINARY TRACT SYMPTOMS: Primary | ICD-10-CM

## 2024-09-16 DIAGNOSIS — R33.9 INCOMPLETE BLADDER EMPTYING: ICD-10-CM

## 2024-09-16 DIAGNOSIS — R39.12 WEAK URINARY STREAM: ICD-10-CM

## 2024-09-16 PROCEDURE — 3074F SYST BP LT 130 MM HG: CPT | Performed by: UROLOGY

## 2024-09-16 PROCEDURE — 99213 OFFICE O/P EST LOW 20 MIN: CPT | Performed by: UROLOGY

## 2024-09-16 PROCEDURE — 3079F DIAST BP 80-89 MM HG: CPT | Performed by: UROLOGY

## 2024-09-16 PROCEDURE — 1123F ACP DISCUSS/DSCN MKR DOCD: CPT | Performed by: UROLOGY

## 2024-09-16 ASSESSMENT — ENCOUNTER SYMPTOMS
ALLERGIC/IMMUNOLOGIC NEGATIVE: 1
RESPIRATORY NEGATIVE: 1
GASTROINTESTINAL NEGATIVE: 1
SHORTNESS OF BREATH: 0
BACK PAIN: 0
EYE REDNESS: 0
ABDOMINAL PAIN: 0
VOMITING: 0
NAUSEA: 0
WHEEZING: 0
COLOR CHANGE: 0
EYE PAIN: 0
COUGH: 0
EYES NEGATIVE: 1

## 2024-10-03 ENCOUNTER — PATIENT MESSAGE (OUTPATIENT)
Dept: PRIMARY CARE CLINIC | Age: 69
End: 2024-10-03

## 2024-10-03 RX ORDER — PREDNISONE 50 MG/1
50 TABLET ORAL DAILY
Qty: 5 TABLET | Refills: 0 | Status: SHIPPED | OUTPATIENT
Start: 2024-10-03 | End: 2024-10-08

## 2024-10-03 RX ORDER — TRIAMCINOLONE ACETONIDE 5 MG/G
CREAM TOPICAL
Qty: 15 G | Refills: 0 | Status: SHIPPED | OUTPATIENT
Start: 2024-10-03

## 2024-10-04 ENCOUNTER — OFFICE VISIT (OUTPATIENT)
Dept: FAMILY MEDICINE CLINIC | Age: 69
End: 2024-10-04

## 2024-10-04 VITALS
HEART RATE: 62 BPM | OXYGEN SATURATION: 99 % | DIASTOLIC BLOOD PRESSURE: 70 MMHG | TEMPERATURE: 98.3 F | RESPIRATION RATE: 16 BRPM | SYSTOLIC BLOOD PRESSURE: 126 MMHG

## 2024-10-04 DIAGNOSIS — L30.9 DERMATITIS: Primary | ICD-10-CM

## 2024-10-04 RX ORDER — METHYLPREDNISOLONE ACETATE 40 MG/ML
40 INJECTION, SUSPENSION INTRA-ARTICULAR; INTRALESIONAL; INTRAMUSCULAR; SOFT TISSUE ONCE
Status: COMPLETED | OUTPATIENT
Start: 2024-10-04 | End: 2024-10-04

## 2024-10-04 RX ORDER — SULFAMETHOXAZOLE/TRIMETHOPRIM 800-160 MG
1 TABLET ORAL 2 TIMES DAILY
Qty: 20 TABLET | Refills: 0 | Status: SHIPPED | OUTPATIENT
Start: 2024-10-04 | End: 2024-10-14

## 2024-10-04 RX ADMIN — METHYLPREDNISOLONE ACETATE 40 MG: 40 INJECTION, SUSPENSION INTRA-ARTICULAR; INTRALESIONAL; INTRAMUSCULAR; SOFT TISSUE at 10:24

## 2024-10-04 ASSESSMENT — ENCOUNTER SYMPTOMS
WHEEZING: 0
ABDOMINAL PAIN: 0
TROUBLE SWALLOWING: 0
VOMITING: 0
COUGH: 0
NAUSEA: 0
SHORTNESS OF BREATH: 0
RHINORRHEA: 0
SORE THROAT: 0

## 2024-10-04 NOTE — PROGRESS NOTES
MG tablet Take 1 tablet by mouth daily for 5 days Yes Michelle Granado APRN - CNP   triamcinolone (ARISTOCORT) 0.5 % cream Apply topically 3 times daily. Yes Michelle Granado APRN - CNP   pantoprazole (PROTONIX) 40 MG tablet TAKE ONE TABLET BY MOUTH ONE TIME A DAY Yes Michelle Granado APRN - CNP   silodosin (RAPAFLO) 8 MG CAPS Take 1 capsule by mouth every evening Yes Navid Pineda MD   ciclopirox (LOPROX) 0.77 % cream  Yes [employer] MD   nitroGLYCERIN (NITROSTAT) 0.4 MG SL tablet Place 1 tablet under the tongue every 5 minutes as needed for Chest pain up to max of 3 total doses. If no relief after 1 dose, call 911. Yes Abraham Meza DO   fluticasone (FLOVENT HFA) 110 MCG/ACT inhaler INHALE 2 PUFFS INTO THE LUNGS TWICE DAILY. Yes Michelle Granado APRN - CNP   atorvastatin (LIPITOR) 80 MG tablet TAKE 1 TABLET BY MOUTH ONCE DAILY AT NIGHT. Yes Abraham Meza DO   clopidogrel (PLAVIX) 75 MG tablet TAKE 1 TABLET BY MOUTH ONCE DAILY. Yes Abraham Meza DO   metoprolol succinate (TOPROL XL) 25 MG extended release tablet Take 1 tablet by mouth at bedtime Yes Abraham Meza DO   albuterol (PROVENTIL) (2.5 MG/3ML) 0.083% nebulizer solution Take 3 mLs by nebulization every 6 hours as needed for Wheezing or Shortness of Breath Yes Michelle Granado APRN - CNP   LORATADINE PO Take by mouth Yes [employer] MD   fluticasone (FLONASE) 50 MCG/ACT nasal spray USE 2 SPRAYS NASALLY AS DIRECTED ONCE DAILY. Yes Michelle Granado APRN - CNP       Allergies   Allergen Reactions    Cephalexin Other (See Comments)     Pt unsure of reaction         Subjective:      Review of Systems   Constitutional:  Negative for chills and fever.   HENT:  Negative for congestion, ear pain, postnasal drip, rhinorrhea, sore throat and trouble swallowing.    Respiratory:  Negative for cough, shortness of breath and wheezing.    Cardiovascular:  Negative for chest pain and palpitations.   Gastrointestinal:  Negative for abdominal

## 2024-12-21 RX ORDER — ATORVASTATIN CALCIUM 80 MG/1
TABLET, FILM COATED ORAL
Qty: 90 TABLET | Refills: 2 | OUTPATIENT
Start: 2024-12-21

## 2024-12-21 RX ORDER — CLOPIDOGREL BISULFATE 75 MG/1
TABLET ORAL
Qty: 90 TABLET | Refills: 2 | OUTPATIENT
Start: 2024-12-21

## 2024-12-27 ENCOUNTER — NURSE ONLY (OUTPATIENT)
Dept: PRIMARY CARE CLINIC | Age: 69
End: 2024-12-27
Payer: COMMERCIAL

## 2024-12-27 DIAGNOSIS — Z23 NEED FOR INFLUENZA VACCINATION: Primary | ICD-10-CM

## 2024-12-27 PROCEDURE — 90653 IIV ADJUVANT VACCINE IM: CPT | Performed by: NURSE PRACTITIONER

## 2024-12-27 PROCEDURE — 90471 IMMUNIZATION ADMIN: CPT | Performed by: NURSE PRACTITIONER

## 2024-12-27 NOTE — PROGRESS NOTES
Vaccine Information Sheet, \"Influenza - Inactivated\"  given to Marko Michel, or parent/legal guardian of  Marko Michel and verbalized understanding.   Injection was given in Right deltoid by Chhaya Gates MA.     Patient responses:  Have you ever had a reaction to a flu vaccine? No  Are you able to eat eggs without adverse effects?  Yes  Do you have any current illness?  No  Have you ever had Guillian [place] Syndrome?  No    Flu vaccine given per order. Please see immunization tab.  Patient tolerated injection & left the office a few minutes later feeling well.

## 2025-01-25 DIAGNOSIS — K21.9 GASTROESOPHAGEAL REFLUX DISEASE, UNSPECIFIED WHETHER ESOPHAGITIS PRESENT: ICD-10-CM

## 2025-01-27 RX ORDER — PANTOPRAZOLE SODIUM 40 MG/1
TABLET, DELAYED RELEASE ORAL
Qty: 90 TABLET | Refills: 1 | Status: SHIPPED | OUTPATIENT
Start: 2025-01-27

## 2025-03-10 ENCOUNTER — HOSPITAL ENCOUNTER (OUTPATIENT)
Age: 70
Setting detail: SPECIMEN
Discharge: HOME | End: 2025-03-10

## 2025-03-10 ENCOUNTER — RESULTS FOLLOW-UP (OUTPATIENT)
Dept: PRIMARY CARE CLINIC | Age: 70
End: 2025-03-10

## 2025-03-10 ENCOUNTER — OFFICE VISIT (OUTPATIENT)
Dept: PRIMARY CARE CLINIC | Age: 70
End: 2025-03-10
Payer: COMMERCIAL

## 2025-03-10 VITALS
WEIGHT: 160.8 LBS | SYSTOLIC BLOOD PRESSURE: 134 MMHG | BODY MASS INDEX: 23.07 KG/M2 | OXYGEN SATURATION: 97 % | HEART RATE: 49 BPM | DIASTOLIC BLOOD PRESSURE: 80 MMHG

## 2025-03-10 DIAGNOSIS — N40.0 BENIGN PROSTATIC HYPERPLASIA WITHOUT LOWER URINARY TRACT SYMPTOMS: ICD-10-CM

## 2025-03-10 DIAGNOSIS — I25.119 ATHEROSCLEROSIS OF NATIVE CORONARY ARTERY OF NATIVE HEART WITH ANGINA PECTORIS: ICD-10-CM

## 2025-03-10 DIAGNOSIS — I10 PRIMARY HYPERTENSION: ICD-10-CM

## 2025-03-10 DIAGNOSIS — Z12.5 SCREENING FOR MALIGNANT NEOPLASM OF PROSTATE: ICD-10-CM

## 2025-03-10 DIAGNOSIS — K21.9 GASTROESOPHAGEAL REFLUX DISEASE, UNSPECIFIED WHETHER ESOPHAGITIS PRESENT: ICD-10-CM

## 2025-03-10 DIAGNOSIS — Z00.00 ANNUAL PHYSICAL EXAM: Primary | ICD-10-CM

## 2025-03-10 DIAGNOSIS — Z53.20 COLONOSCOPY REFUSED: ICD-10-CM

## 2025-03-10 LAB
ALBUMIN SERPL-MCNC: 4.2 G/DL (ref 3.5–5.2)
ALBUMIN/GLOB SERPL: 1.9 {RATIO} (ref 1–2.5)
ALP SERPL-CCNC: 83 U/L (ref 40–129)
ALT SERPL-CCNC: 25 U/L (ref 10–50)
ANION GAP SERPL CALCULATED.3IONS-SCNC: 8 MMOL/L (ref 9–16)
AST SERPL-CCNC: 32 U/L (ref 10–50)
BASOPHILS # BLD: <0.03 K/UL (ref 0–0.2)
BASOPHILS NFR BLD: 1 % (ref 0–2)
BILIRUB SERPL-MCNC: 0.9 MG/DL (ref 0–1.2)
BUN SERPL-MCNC: 19 MG/DL (ref 8–23)
CALCIUM SERPL-MCNC: 9.4 MG/DL (ref 8.6–10.4)
CHLORIDE SERPL-SCNC: 104 MMOL/L (ref 98–107)
CHOLEST SERPL-MCNC: 119 MG/DL (ref 0–199)
CHOLESTEROL/HDL RATIO: 1.7
CO2 SERPL-SCNC: 27 MMOL/L (ref 20–31)
CREAT SERPL-MCNC: 0.9 MG/DL (ref 0.7–1.2)
EOSINOPHIL # BLD: 0.1 K/UL (ref 0–0.44)
EOSINOPHILS RELATIVE PERCENT: 2 % (ref 1–4)
ERYTHROCYTE [DISTWIDTH] IN BLOOD BY AUTOMATED COUNT: 12 % (ref 11.8–14.4)
GFR, ESTIMATED: >90 ML/MIN/1.73M2
GLUCOSE SERPL-MCNC: 95 MG/DL (ref 74–99)
HCT VFR BLD AUTO: 41.3 % (ref 40.7–50.3)
HDLC SERPL-MCNC: 71 MG/DL
HGB BLD-MCNC: 13.1 G/DL (ref 13–17)
IMM GRANULOCYTES # BLD AUTO: <0.03 K/UL (ref 0–0.3)
IMM GRANULOCYTES NFR BLD: 0 %
LDLC SERPL CALC-MCNC: 42 MG/DL (ref 0–100)
LYMPHOCYTES NFR BLD: 1.42 K/UL (ref 1.1–3.7)
LYMPHOCYTES RELATIVE PERCENT: 34 % (ref 24–43)
MCH RBC QN AUTO: 31.6 PG (ref 25.2–33.5)
MCHC RBC AUTO-ENTMCNC: 31.7 G/DL (ref 28.4–34.8)
MCV RBC AUTO: 99.8 FL (ref 82.6–102.9)
MONOCYTES NFR BLD: 0.52 K/UL (ref 0.1–1.2)
MONOCYTES NFR BLD: 13 % (ref 3–12)
NEUTROPHILS NFR BLD: 50 % (ref 36–65)
NEUTS SEG NFR BLD: 2.1 K/UL (ref 1.5–8.1)
NRBC BLD-RTO: 0 PER 100 WBC
PLATELET # BLD AUTO: 204 K/UL (ref 138–453)
PMV BLD AUTO: 10.7 FL (ref 8.1–13.5)
POTASSIUM SERPL-SCNC: 4.8 MMOL/L (ref 3.7–5.3)
PROT SERPL-MCNC: 6.4 G/DL (ref 6.6–8.7)
PSA SERPL-MCNC: 2.75 NG/ML (ref 0–4)
RBC # BLD AUTO: 4.14 M/UL (ref 4.21–5.77)
SODIUM SERPL-SCNC: 139 MMOL/L (ref 136–145)
TRIGL SERPL-MCNC: 29 MG/DL
VLDLC SERPL CALC-MCNC: 6 MG/DL (ref 1–30)
WBC OTHER # BLD: 4.2 K/UL (ref 3.5–11.3)

## 2025-03-10 PROCEDURE — 3079F DIAST BP 80-89 MM HG: CPT | Performed by: NURSE PRACTITIONER

## 2025-03-10 PROCEDURE — 99397 PER PM REEVAL EST PAT 65+ YR: CPT | Performed by: NURSE PRACTITIONER

## 2025-03-10 PROCEDURE — 3075F SYST BP GE 130 - 139MM HG: CPT | Performed by: NURSE PRACTITIONER

## 2025-03-10 SDOH — ECONOMIC STABILITY: FOOD INSECURITY: WITHIN THE PAST 12 MONTHS, THE FOOD YOU BOUGHT JUST DIDN'T LAST AND YOU DIDN'T HAVE MONEY TO GET MORE.: NEVER TRUE

## 2025-03-10 SDOH — ECONOMIC STABILITY: FOOD INSECURITY: WITHIN THE PAST 12 MONTHS, YOU WORRIED THAT YOUR FOOD WOULD RUN OUT BEFORE YOU GOT MONEY TO BUY MORE.: NEVER TRUE

## 2025-03-10 ASSESSMENT — ENCOUNTER SYMPTOMS
BLOOD IN STOOL: 0
SHORTNESS OF BREATH: 0
SORE THROAT: 0
NAUSEA: 0
TROUBLE SWALLOWING: 0
CONSTIPATION: 0
ABDOMINAL PAIN: 0
SINUS PRESSURE: 0
DIARRHEA: 0
WHEEZING: 0
COUGH: 0
VOMITING: 0

## 2025-03-10 ASSESSMENT — PATIENT HEALTH QUESTIONNAIRE - PHQ9
SUM OF ALL RESPONSES TO PHQ QUESTIONS 1-9: 0
4. FEELING TIRED OR HAVING LITTLE ENERGY: NOT AT ALL
10. IF YOU CHECKED OFF ANY PROBLEMS, HOW DIFFICULT HAVE THESE PROBLEMS MADE IT FOR YOU TO DO YOUR WORK, TAKE CARE OF THINGS AT HOME, OR GET ALONG WITH OTHER PEOPLE: NOT DIFFICULT AT ALL
SUM OF ALL RESPONSES TO PHQ QUESTIONS 1-9: 0
7. TROUBLE CONCENTRATING ON THINGS, SUCH AS READING THE NEWSPAPER OR WATCHING TELEVISION: NOT AT ALL
3. TROUBLE FALLING OR STAYING ASLEEP: NOT AT ALL
SUM OF ALL RESPONSES TO PHQ QUESTIONS 1-9: 0
SUM OF ALL RESPONSES TO PHQ QUESTIONS 1-9: 0
5. POOR APPETITE OR OVEREATING: NOT AT ALL
6. FEELING BAD ABOUT YOURSELF - OR THAT YOU ARE A FAILURE OR HAVE LET YOURSELF OR YOUR FAMILY DOWN: NOT AT ALL
2. FEELING DOWN, DEPRESSED OR HOPELESS: NOT AT ALL
8. MOVING OR SPEAKING SO SLOWLY THAT OTHER PEOPLE COULD HAVE NOTICED. OR THE OPPOSITE, BEING SO FIGETY OR RESTLESS THAT YOU HAVE BEEN MOVING AROUND A LOT MORE THAN USUAL: NOT AT ALL
9. THOUGHTS THAT YOU WOULD BE BETTER OFF DEAD, OR OF HURTING YOURSELF: NOT AT ALL
1. LITTLE INTEREST OR PLEASURE IN DOING THINGS: NOT AT ALL

## 2025-03-10 NOTE — PROGRESS NOTES
[facility]  [facility]  [address] [place] [address]   [address]  [facility] [address]  Dept: [phone]  Dept Fax: [phone]    Marko Michel is a 69 y.o. male who presentstoday for his medical conditions/complaints as noted below.  Marko Michel is c/o of  Chief Complaint   Patient presents with    Annual Exam    Hypertension    Coronary Artery Disease         HPI:     History of Present Illness  The patient is a 69-year-old male who presents for annual exam and follow up    He reports overall good health with no significant concerns at present. He underwent a stress test under the supervision of cardiologist towards the end of the previous year due to experiencing chest soreness. Reports were negative for any changes    He has never undergone colon cancer screening and continues to refuse.  He denies any issues or changes with his bowel pattern and states \"at this point something else we will probably get me before colon cancer would\".    He experiences occasional interruptions in urination at night. He is uncertain about complete bladder emptying. At recent appt with urology he prescribed Rapaflo and he is scheduled for a bladder scan next week.     He endorses healthy diet and continues to exercise regularly      Hemoglobin A1C (%)   Date Value   08/27/2021 5.4             ( goal A1C is < 7)   No components found for: \"LABMICR\"  No components found for: \"LDLCHOLESTEROL\", \"LDLCALC\"    (goal LDL is <100)   AST (U/L)   Date Value   06/25/2024 23     ALT (U/L)   Date Value   06/25/2024 17     BUN (mg/dL)   Date Value   06/25/2024 15     BP Readings from Last 3 Encounters:   03/10/25 134/80   12/19/24 128/82   12/18/24 128/82          ([id number])    Past Medical History:   Diagnosis Date    Allergic rhinitis     Asthma     inhaler    CAD (coronary artery disease)     MI 7/26/2006; 2 stents.  Cardiology: Dr Meza    [facility] maintenance     PCP:  Michelle SNOWDEN-CNP   last seen

## 2025-08-06 DIAGNOSIS — K21.9 GASTROESOPHAGEAL REFLUX DISEASE, UNSPECIFIED WHETHER ESOPHAGITIS PRESENT: ICD-10-CM

## 2025-08-06 RX ORDER — SILODOSIN 8 MG/1
8 CAPSULE ORAL EVERY EVENING
Qty: 90 CAPSULE | Refills: 3 | Status: SHIPPED | OUTPATIENT
Start: 2025-08-06

## 2025-08-06 RX ORDER — PANTOPRAZOLE SODIUM 40 MG/1
TABLET, DELAYED RELEASE ORAL
Qty: 90 TABLET | Refills: 3 | Status: SHIPPED | OUTPATIENT
Start: 2025-08-06

## 2025-09-05 ENCOUNTER — COMMUNITY OUTREACH (OUTPATIENT)
Dept: PRIMARY CARE CLINIC | Age: 70
End: 2025-09-05

## (undated) DEVICE — LEAD PACE L475MM CHNL A OR V MYOCARDIAL STEROID ELUT SIL

## (undated) DEVICE — GOWN,SIRUS,NONRNF,SETINSLV,XL,20/CS: Brand: MEDLINE

## (undated) DEVICE — .: Brand: PERFECTCUT AORTOTOMY SYSTEM

## (undated) DEVICE — RETRACTOR SURG INSRT SUT HLD OCTOBASE

## (undated) DEVICE — INTENDED FOR TISSUE SEPARATION, AND OTHER PROCEDURES THAT REQUIRE A SHARP SURGICAL BLADE TO PUNCTURE OR CUT.: Brand: BARD-PARKER ® CARBON RIB-BACK BLADES

## (undated) DEVICE — TUBING, SUCTION, 9/32" X 20', STRAIGHT: Brand: MEDLINE INDUSTRIES, INC.

## (undated) DEVICE — Z DISCONTINUED NO SUB IDED DRAIN SURG 2 COLL PT TB FOR ATS BG OASIS

## (undated) DEVICE — DRAPE SLUSH DISC W44XL66IN ST FOR RND BSIN HUSH SLUSH SYS

## (undated) DEVICE — CLIP INT SM WIDE RED TI TRNSVRS GRV CHEVRON SHP W PRECIS

## (undated) DEVICE — Z DISCONTINUED BY MEDLINE USE 2711682 TRAY SKIN PREP DRY W/ PREM GLV

## (undated) DEVICE — CANNULA PERFUSION 5.5IN 9FR AORTIC ROOT

## (undated) DEVICE — BLADE OPHTH ORNG GRINDLESS SMALLER ALTERNATIVE TO NO15 GEN

## (undated) DEVICE — SS SUTURE, 3 PER SLEEVE: Brand: MYO/WIRE II

## (undated) DEVICE — PACK PROCEDURE SURG OPN HRT

## (undated) DEVICE — GOWN,AURORA,NONREINFORCED,LARGE: Brand: MEDLINE

## (undated) DEVICE — PROTECTOR ULN NRV PUR FOAM HK LOOP STRP ANATOMICALLY

## (undated) DEVICE — GOWN,SIRUS,POLYRNF,BRTHSLV,LG,30/CS: Brand: MEDLINE

## (undated) DEVICE — APPLICATOR MEDICATED 10.5 CC SOLUTION HI LT ORNG CHLORAPREP

## (undated) DEVICE — CONNECTOR TBNG WHT PLAS SUCT STR 5IN1 LTWT W/ M CONN

## (undated) DEVICE — GLOVE SURG SZ 75 CRM LTX FREE POLYISOPRENE POLYMER BEAD ANTI

## (undated) DEVICE — SUTURE VCRL + SZ 4-0 L18IN ABSRB UD L19MM PS-2 3/8 CIR PRIM VCP496H

## (undated) DEVICE — SUTURE PROL SZ 6-0 L18IN NONABSORBABLE BLU RB-2 L13MM 1/2 8714H

## (undated) DEVICE — BLADE ES L6IN ELASTOMERIC COAT EXT DURABLE BEND UPTO 90DEG

## (undated) DEVICE — GEL US 20GM NONIRRITATING OVERWRAPPED FILE PCH TRNSMIT

## (undated) DEVICE — SUTURE PROL SZ 4-0 L36IN NONABSORBABLE BLU L26MM SH 1/2 CIR 8521H

## (undated) DEVICE — PLEDGET SURG W3.5XL7MM THK1.5MM WHT PTFE RECT FIRM TFE

## (undated) DEVICE — INSUFFLATION TUBING SET WITH FILTER, FUNNEL CONNECTOR AND LUER LOCK: Brand: JOSNOE MEDICAL INC

## (undated) DEVICE — FOGARTY - HYDRAGRIP SURGICAL - CLAMP INSERTS: Brand: FOGARTY HYDRAJAW

## (undated) DEVICE — BNDG,ELSTC,MATRIX,STRL,6"X5YD,LF,HOOK&LP: Brand: MEDLINE

## (undated) DEVICE — SUTURE VCRL + SZ 4-0 L27IN ABSRB UD L26MM SH 1/2 CIR VCP415H

## (undated) DEVICE — SUTURE PERMA-HAND SZ 0 L18IN NONABSORBABLE BLK CT-2 L26MM C027D

## (undated) DEVICE — CLIP LIG M BLU TI HRT SHP WIRE HORZ 180 PER BX

## (undated) DEVICE — GLOVE SURG SZ 65 CRM LTX FREE POLYISOPRENE POLYMER BEAD ANTI

## (undated) DEVICE — APPLICATOR MEDICATED 26 CC SOLUTION HI LT ORNG CHLORAPREP

## (undated) DEVICE — COVER,LIGHT HANDLE,FLX,2/PK: Brand: MEDLINE INDUSTRIES, INC.

## (undated) DEVICE — SUTURE ETHIB EXCL BR GRN TAPR PT 2-0 30 X563H X563H

## (undated) DEVICE — BNDG,ELSTC,MATRIX,STRL,4"X5YD,LF,HOOK&LP: Brand: MEDLINE

## (undated) DEVICE — WAX SURG 2.5GM HEMSTAT BNE BEESWAX PARAFFIN ISO PALMITATE

## (undated) DEVICE — PACK PROCEDURE SURG OPN HRT ADD ON

## (undated) DEVICE — GLOVE SURG SZ 7.5 L11.73IN FNGR THK9.8MIL STRW LTX POLYMER

## (undated) DEVICE — GOWN,SIRUS,NONRNF,SETINSLV,2XL,18/CS: Brand: MEDLINE

## (undated) DEVICE — CATHETER,URETHRAL,REDRUBBER,STRL,18FR: Brand: MEDLINE

## (undated) DEVICE — TOWEL,OR,DSP,ST,BLUE,DLX,XR,4/PK,20PK/CS: Brand: MEDLINE

## (undated) DEVICE — TUBE CARDIAC SUCTION 6FR SOFT TIP 10FR

## (undated) DEVICE — SPONGE LAP W18XL18IN WHT COT 4 PLY FLD STRUNG RADPQ DISP ST

## (undated) DEVICE — CANNULA PERF L2IN BLNT TIP 2MM VES CLR RADPQ BODY FEM LUER

## (undated) DEVICE — GLOVE SURG SZ 7 CRM LTX FREE POLYISOPRENE POLYMER BEAD ANTI

## (undated) DEVICE — POSITIONER,HEAD,MULTIRING,36CS: Brand: MEDLINE

## (undated) DEVICE — SUTURE MCRYL SZ 4-0 L18IN ABSRB UD L19MM PS-2 3/8 CIR PRIM Y496G

## (undated) DEVICE — PLATELET CONCENTRATION PACK PROC 14-20 ML SMARTPREP 2

## (undated) DEVICE — SUTURE VCRL + SZ 3-0 L27IN ABSRB WHT CT-1 1/2 CIR VCP258H

## (undated) DEVICE — TTL1LYR 16FR10ML 100%SIL TMPST TR: Brand: MEDLINE

## (undated) DEVICE — GLOVE SURG SZ 65 L12IN FNGR THK79MIL GRN LTX FREE

## (undated) DEVICE — ZIMMER® STERILE DISPOSABLE TOURNIQUET CUFF WITH PROTECTIVE SLEEVE AND PLC, DUAL PORT, SINGLE BLADDER, 24 IN. (61 CM)

## (undated) DEVICE — 1/4 FORCE SURGICAL SPRING CLIP: Brand: STEALTH® SPRING CLIP

## (undated) DEVICE — Device: Brand: VIRTUOSAPH PLUS WITH RADIAL INDICATION

## (undated) DEVICE — COVER,MAYO STAND,STERILE: Brand: MEDLINE

## (undated) DEVICE — CLIP INT SM WIDE RED TI TRNSVRS GRV CHEVRON SHP W/ PRECIS

## (undated) DEVICE — CLIP INT M L GRN TI TRNSVRS GRV CHEVRON SHP W/ PRECIS TIP

## (undated) DEVICE — BLADE OPHTH D5MM 15DEG GRN W/ RND KNURLED HNDL MICRO-SHARP

## (undated) DEVICE — AGENT HEMSTAT W2XL4IN OXIDIZED REGENERATED CELOS ABSRB SFT

## (undated) DEVICE — DRAPE,REIN 53X77,STERILE: Brand: MEDLINE

## (undated) DEVICE — SUTURE PROL SZ 7-0 L24IN NONABSORBABLE BLU L8MM BV175-6 3/8 8735H

## (undated) DEVICE — DRAIN,WOUND,ROUND,24FR,5/16",FULL-FLUTED: Brand: MEDLINE

## (undated) DEVICE — ADHESIVE SKIN CLOSURE TOP 36 CC HI VISC DERMBND MINI

## (undated) DEVICE — SUTURE PDS II SZ 0 L27IN ABSRB VLT L36MM CT-1 1/2 CIR Z340H